# Patient Record
Sex: MALE | Race: WHITE | Employment: OTHER | URBAN - METROPOLITAN AREA
[De-identification: names, ages, dates, MRNs, and addresses within clinical notes are randomized per-mention and may not be internally consistent; named-entity substitution may affect disease eponyms.]

---

## 2020-05-14 ENCOUNTER — APPOINTMENT (OUTPATIENT)
Dept: GENERAL RADIOLOGY | Age: 60
DRG: 872 | End: 2020-05-14
Payer: MEDICARE

## 2020-05-14 ENCOUNTER — HOSPITAL ENCOUNTER (INPATIENT)
Age: 60
LOS: 5 days | Discharge: NURSING, REHAB OR LONG-TERM CARE | DRG: 872 | End: 2020-05-19
Attending: INTERNAL MEDICINE | Admitting: INTERNAL MEDICINE
Payer: MEDICARE

## 2020-05-14 ENCOUNTER — APPOINTMENT (OUTPATIENT)
Dept: CT IMAGING | Age: 60
DRG: 872 | End: 2020-05-14
Payer: MEDICARE

## 2020-05-14 PROBLEM — R29.6 FREQUENT FALLS: Status: ACTIVE | Noted: 2020-05-14

## 2020-05-14 PROBLEM — R53.1 WEAKNESS: Status: ACTIVE | Noted: 2020-05-14

## 2020-05-14 PROBLEM — C32.9 LARYNGEAL CARCINOMA (HCC): Status: ACTIVE | Noted: 2020-05-14

## 2020-05-14 PROBLEM — K21.9 GASTROESOPHAGEAL REFLUX: Status: ACTIVE | Noted: 2020-05-14

## 2020-05-14 PROBLEM — L03.90 CELLULITIS: Status: ACTIVE | Noted: 2020-05-14

## 2020-05-14 LAB
ALBUMIN SERPL-MCNC: 3.9 G/DL (ref 3.5–4.6)
ALP BLD-CCNC: 58 U/L (ref 35–104)
ALT SERPL-CCNC: <5 U/L (ref 0–41)
ANION GAP SERPL CALCULATED.3IONS-SCNC: 12 MEQ/L (ref 9–15)
AST SERPL-CCNC: 62 U/L (ref 0–40)
BACTERIA: NEGATIVE /HPF
BASOPHILS ABSOLUTE: 0 K/UL (ref 0–0.2)
BASOPHILS RELATIVE PERCENT: 0.3 %
BILIRUB SERPL-MCNC: 0.7 MG/DL (ref 0.2–0.7)
BILIRUBIN URINE: NEGATIVE
BLOOD, URINE: NEGATIVE
BUN BLDV-MCNC: 11 MG/DL (ref 8–23)
CALCIUM SERPL-MCNC: 9.2 MG/DL (ref 8.5–9.9)
CHLORIDE BLD-SCNC: 94 MEQ/L (ref 95–107)
CLARITY: CLEAR
CO2: 21 MEQ/L (ref 20–31)
COLOR: YELLOW
CREAT SERPL-MCNC: 0.65 MG/DL (ref 0.7–1.2)
EKG ATRIAL RATE: 105 BPM
EKG P AXIS: 52 DEGREES
EKG P-R INTERVAL: 152 MS
EKG Q-T INTERVAL: 356 MS
EKG QRS DURATION: 78 MS
EKG QTC CALCULATION (BAZETT): 470 MS
EKG R AXIS: 10 DEGREES
EKG T AXIS: 19 DEGREES
EKG VENTRICULAR RATE: 105 BPM
EOSINOPHILS ABSOLUTE: 0 K/UL (ref 0–0.7)
EOSINOPHILS RELATIVE PERCENT: 0 %
EPITHELIAL CELLS, UA: NORMAL /HPF (ref 0–5)
GFR AFRICAN AMERICAN: >60
GFR NON-AFRICAN AMERICAN: >60
GLOBULIN: 4.4 G/DL (ref 2.3–3.5)
GLUCOSE BLD-MCNC: 118 MG/DL (ref 70–99)
GLUCOSE URINE: NEGATIVE MG/DL
HCT VFR BLD CALC: 42.6 % (ref 42–52)
HEMOGLOBIN: 14.1 G/DL (ref 14–18)
HYALINE CASTS: NORMAL /HPF (ref 0–5)
KETONES, URINE: NEGATIVE MG/DL
LACTIC ACID: 2.3 MMOL/L (ref 0.5–2.2)
LEUKOCYTE ESTERASE, URINE: NEGATIVE
LYMPHOCYTES ABSOLUTE: 0.4 K/UL (ref 1–4.8)
LYMPHOCYTES RELATIVE PERCENT: 3 %
MCH RBC QN AUTO: 30.7 PG (ref 27–31.3)
MCHC RBC AUTO-ENTMCNC: 33.2 % (ref 33–37)
MCV RBC AUTO: 92.6 FL (ref 80–100)
MONOCYTES ABSOLUTE: 1.1 K/UL (ref 0.2–0.8)
MONOCYTES RELATIVE PERCENT: 7.3 %
NEUTROPHILS ABSOLUTE: 13 K/UL (ref 1.4–6.5)
NEUTROPHILS RELATIVE PERCENT: 89.4 %
NITRITE, URINE: NEGATIVE
PDW BLD-RTO: 15.3 % (ref 11.5–14.5)
PH UA: 7 (ref 5–9)
PLATELET # BLD: 249 K/UL (ref 130–400)
POTASSIUM SERPL-SCNC: 3.3 MEQ/L (ref 3.4–4.9)
POTASSIUM SERPL-SCNC: ABNORMAL MEQ/L (ref 3.4–4.9)
PROTEIN UA: 100 MG/DL
RBC # BLD: 4.6 M/UL (ref 4.7–6.1)
RBC UA: NORMAL /HPF (ref 0–5)
REASON FOR REJECTION: NORMAL
REJECTED TEST: NORMAL
SODIUM BLD-SCNC: 127 MEQ/L (ref 135–144)
SPECIFIC GRAVITY UA: 1.02 (ref 1–1.03)
TOTAL PROTEIN: 8.3 G/DL (ref 6.3–8)
URINE REFLEX TO CULTURE: ABNORMAL
UROBILINOGEN, URINE: 1 E.U./DL
WBC # BLD: 14.6 K/UL (ref 4.8–10.8)
WBC UA: NORMAL /HPF (ref 0–5)

## 2020-05-14 PROCEDURE — 97162 PT EVAL MOD COMPLEX 30 MIN: CPT

## 2020-05-14 PROCEDURE — 87186 SC STD MICRODIL/AGAR DIL: CPT

## 2020-05-14 PROCEDURE — 6370000000 HC RX 637 (ALT 250 FOR IP): Performed by: INTERNAL MEDICINE

## 2020-05-14 PROCEDURE — 36415 COLL VENOUS BLD VENIPUNCTURE: CPT

## 2020-05-14 PROCEDURE — 6370000000 HC RX 637 (ALT 250 FOR IP): Performed by: NURSE PRACTITIONER

## 2020-05-14 PROCEDURE — 70450 CT HEAD/BRAIN W/O DYE: CPT

## 2020-05-14 PROCEDURE — 93010 ELECTROCARDIOGRAM REPORT: CPT | Performed by: INTERNAL MEDICINE

## 2020-05-14 PROCEDURE — 1210000000 HC MED SURG R&B

## 2020-05-14 PROCEDURE — 96374 THER/PROPH/DIAG INJ IV PUSH: CPT

## 2020-05-14 PROCEDURE — 71045 X-RAY EXAM CHEST 1 VIEW: CPT

## 2020-05-14 PROCEDURE — 87149 DNA/RNA DIRECT PROBE: CPT

## 2020-05-14 PROCEDURE — 80053 COMPREHEN METABOLIC PANEL: CPT

## 2020-05-14 PROCEDURE — 2580000003 HC RX 258: Performed by: NURSE PRACTITIONER

## 2020-05-14 PROCEDURE — 6360000002 HC RX W HCPCS: Performed by: NURSE PRACTITIONER

## 2020-05-14 PROCEDURE — 2580000003 HC RX 258: Performed by: INTERNAL MEDICINE

## 2020-05-14 PROCEDURE — 93005 ELECTROCARDIOGRAM TRACING: CPT | Performed by: NURSE PRACTITIONER

## 2020-05-14 PROCEDURE — 73590 X-RAY EXAM OF LOWER LEG: CPT

## 2020-05-14 PROCEDURE — 81001 URINALYSIS AUTO W/SCOPE: CPT

## 2020-05-14 PROCEDURE — 99285 EMERGENCY DEPT VISIT HI MDM: CPT

## 2020-05-14 PROCEDURE — 6360000002 HC RX W HCPCS: Performed by: INTERNAL MEDICINE

## 2020-05-14 PROCEDURE — 96368 THER/DIAG CONCURRENT INF: CPT

## 2020-05-14 PROCEDURE — 96366 THER/PROPH/DIAG IV INF ADDON: CPT

## 2020-05-14 PROCEDURE — 84132 ASSAY OF SERUM POTASSIUM: CPT

## 2020-05-14 PROCEDURE — 85025 COMPLETE CBC W/AUTO DIFF WBC: CPT

## 2020-05-14 PROCEDURE — 96365 THER/PROPH/DIAG IV INF INIT: CPT

## 2020-05-14 PROCEDURE — 83605 ASSAY OF LACTIC ACID: CPT

## 2020-05-14 PROCEDURE — 96375 TX/PRO/DX INJ NEW DRUG ADDON: CPT

## 2020-05-14 PROCEDURE — 87040 BLOOD CULTURE FOR BACTERIA: CPT

## 2020-05-14 PROCEDURE — 73552 X-RAY EXAM OF FEMUR 2/>: CPT

## 2020-05-14 PROCEDURE — 87077 CULTURE AEROBIC IDENTIFY: CPT

## 2020-05-14 PROCEDURE — 99222 1ST HOSP IP/OBS MODERATE 55: CPT | Performed by: INTERNAL MEDICINE

## 2020-05-14 RX ORDER — OXYCODONE HYDROCHLORIDE AND ACETAMINOPHEN 5; 325 MG/1; MG/1
2 TABLET ORAL EVERY 4 HOURS PRN
Status: ON HOLD | COMMUNITY
End: 2020-06-08

## 2020-05-14 RX ORDER — MORPHINE SULFATE 2 MG/ML
2 INJECTION, SOLUTION INTRAMUSCULAR; INTRAVENOUS ONCE
Status: COMPLETED | OUTPATIENT
Start: 2020-05-14 | End: 2020-05-14

## 2020-05-14 RX ORDER — PROMETHAZINE HYDROCHLORIDE 12.5 MG/1
12.5 TABLET ORAL EVERY 6 HOURS PRN
Status: DISCONTINUED | OUTPATIENT
Start: 2020-05-14 | End: 2020-05-19

## 2020-05-14 RX ORDER — ONDANSETRON 2 MG/ML
4 INJECTION INTRAMUSCULAR; INTRAVENOUS ONCE
Status: COMPLETED | OUTPATIENT
Start: 2020-05-14 | End: 2020-05-14

## 2020-05-14 RX ORDER — OXYCODONE HYDROCHLORIDE AND ACETAMINOPHEN 5; 325 MG/1; MG/1
1 TABLET ORAL EVERY 4 HOURS PRN
Status: DISCONTINUED | OUTPATIENT
Start: 2020-05-14 | End: 2020-05-15

## 2020-05-14 RX ORDER — SODIUM CHLORIDE 0.9 % (FLUSH) 0.9 %
10 SYRINGE (ML) INJECTION EVERY 12 HOURS SCHEDULED
Status: DISCONTINUED | OUTPATIENT
Start: 2020-05-14 | End: 2020-05-19 | Stop reason: HOSPADM

## 2020-05-14 RX ORDER — ONDANSETRON 2 MG/ML
4 INJECTION INTRAMUSCULAR; INTRAVENOUS EVERY 6 HOURS PRN
Status: DISCONTINUED | OUTPATIENT
Start: 2020-05-14 | End: 2020-05-19

## 2020-05-14 RX ORDER — 0.9 % SODIUM CHLORIDE 0.9 %
1000 INTRAVENOUS SOLUTION INTRAVENOUS ONCE
Status: COMPLETED | OUTPATIENT
Start: 2020-05-14 | End: 2020-05-14

## 2020-05-14 RX ORDER — OXYCODONE HYDROCHLORIDE AND ACETAMINOPHEN 5; 325 MG/1; MG/1
1 TABLET ORAL EVERY 4 HOURS PRN
Status: DISCONTINUED | OUTPATIENT
Start: 2020-05-14 | End: 2020-05-14

## 2020-05-14 RX ORDER — POLYETHYLENE GLYCOL 3350 17 G/17G
17 POWDER, FOR SOLUTION ORAL DAILY PRN
Status: DISCONTINUED | OUTPATIENT
Start: 2020-05-14 | End: 2020-05-19

## 2020-05-14 RX ORDER — FAMOTIDINE 20 MG/1
20 TABLET, FILM COATED ORAL 2 TIMES DAILY
Status: DISCONTINUED | OUTPATIENT
Start: 2020-05-14 | End: 2020-05-19

## 2020-05-14 RX ORDER — OXYCODONE HYDROCHLORIDE AND ACETAMINOPHEN 5; 325 MG/1; MG/1
2 TABLET ORAL EVERY 4 HOURS PRN
Status: DISCONTINUED | OUTPATIENT
Start: 2020-05-14 | End: 2020-05-15

## 2020-05-14 RX ORDER — ACETAMINOPHEN 325 MG/1
650 TABLET ORAL EVERY 6 HOURS PRN
Status: DISCONTINUED | OUTPATIENT
Start: 2020-05-14 | End: 2020-05-17

## 2020-05-14 RX ORDER — ACETAMINOPHEN 650 MG/1
650 SUPPOSITORY RECTAL ONCE
Status: COMPLETED | OUTPATIENT
Start: 2020-05-14 | End: 2020-05-14

## 2020-05-14 RX ORDER — OXYCODONE HYDROCHLORIDE AND ACETAMINOPHEN 325; 5 MG/5ML; MG/5ML
SOLUTION ORAL EVERY 4 HOURS PRN
Status: ON HOLD | COMMUNITY
End: 2020-05-14

## 2020-05-14 RX ORDER — ACETAMINOPHEN 650 MG/1
650 SUPPOSITORY RECTAL EVERY 6 HOURS PRN
Status: DISCONTINUED | OUTPATIENT
Start: 2020-05-14 | End: 2020-05-17

## 2020-05-14 RX ORDER — MORPHINE SULFATE 2 MG/ML
4 INJECTION, SOLUTION INTRAMUSCULAR; INTRAVENOUS ONCE
Status: COMPLETED | OUTPATIENT
Start: 2020-05-14 | End: 2020-05-14

## 2020-05-14 RX ORDER — SODIUM CHLORIDE 9 MG/ML
INJECTION, SOLUTION INTRAVENOUS CONTINUOUS
Status: DISCONTINUED | OUTPATIENT
Start: 2020-05-14 | End: 2020-05-15

## 2020-05-14 RX ORDER — SODIUM CHLORIDE 0.9 % (FLUSH) 0.9 %
10 SYRINGE (ML) INJECTION PRN
Status: DISCONTINUED | OUTPATIENT
Start: 2020-05-14 | End: 2020-05-19 | Stop reason: HOSPADM

## 2020-05-14 RX ADMIN — HYDROMORPHONE HYDROCHLORIDE 1 MG: 1 INJECTION, SOLUTION INTRAMUSCULAR; INTRAVENOUS; SUBCUTANEOUS at 04:10

## 2020-05-14 RX ADMIN — OXYCODONE HYDROCHLORIDE AND ACETAMINOPHEN 2 TABLET: 5; 325 TABLET ORAL at 18:38

## 2020-05-14 RX ADMIN — ENOXAPARIN SODIUM 40 MG: 40 INJECTION SUBCUTANEOUS at 09:00

## 2020-05-14 RX ADMIN — OXYCODONE HYDROCHLORIDE AND ACETAMINOPHEN 1 TABLET: 5; 325 TABLET ORAL at 13:44

## 2020-05-14 RX ADMIN — ACETAMINOPHEN 650 MG: 325 TABLET ORAL at 06:06

## 2020-05-14 RX ADMIN — SODIUM CHLORIDE 1000 ML: 9 INJECTION, SOLUTION INTRAVENOUS at 03:52

## 2020-05-14 RX ADMIN — Medication 10 ML: at 09:00

## 2020-05-14 RX ADMIN — ACETAMINOPHEN 650 MG: 325 TABLET ORAL at 16:27

## 2020-05-14 RX ADMIN — OXYCODONE HYDROCHLORIDE AND ACETAMINOPHEN 2 TABLET: 5; 325 TABLET ORAL at 23:55

## 2020-05-14 RX ADMIN — PIPERACILLIN AND TAZOBACTAM 3.38 G: 3; .375 INJECTION, POWDER, LYOPHILIZED, FOR SOLUTION INTRAVENOUS at 02:31

## 2020-05-14 RX ADMIN — SODIUM CHLORIDE: 9 INJECTION, SOLUTION INTRAVENOUS at 06:07

## 2020-05-14 RX ADMIN — CEFTRIAXONE SODIUM 1 G: 1 INJECTION, POWDER, FOR SOLUTION INTRAMUSCULAR; INTRAVENOUS at 13:44

## 2020-05-14 RX ADMIN — MORPHINE SULFATE 2 MG: 2 INJECTION, SOLUTION INTRAMUSCULAR; INTRAVENOUS at 06:21

## 2020-05-14 RX ADMIN — VANCOMYCIN HYDROCHLORIDE 1000 MG: 1 INJECTION, POWDER, LYOPHILIZED, FOR SOLUTION INTRAVENOUS at 02:26

## 2020-05-14 RX ADMIN — MORPHINE SULFATE 4 MG: 2 INJECTION, SOLUTION INTRAMUSCULAR; INTRAVENOUS at 02:35

## 2020-05-14 RX ADMIN — FAMOTIDINE 20 MG: 20 TABLET ORAL at 20:47

## 2020-05-14 RX ADMIN — SODIUM CHLORIDE: 9 INJECTION, SOLUTION INTRAVENOUS at 20:41

## 2020-05-14 RX ADMIN — ACETAMINOPHEN 650 MG: 650 SUPPOSITORY RECTAL at 02:41

## 2020-05-14 RX ADMIN — Medication 10 ML: at 20:45

## 2020-05-14 RX ADMIN — PIPERACILLIN AND TAZOBACTAM 3.38 G: 3; .375 INJECTION, POWDER, LYOPHILIZED, FOR SOLUTION INTRAVENOUS at 10:45

## 2020-05-14 RX ADMIN — ONDANSETRON 4 MG: 2 INJECTION INTRAMUSCULAR; INTRAVENOUS at 02:35

## 2020-05-14 RX ADMIN — FAMOTIDINE 20 MG: 20 TABLET ORAL at 09:00

## 2020-05-14 ASSESSMENT — ENCOUNTER SYMPTOMS
EYE REDNESS: 0
RHINORRHEA: 0
BLOOD IN STOOL: 0
BACK PAIN: 0
WHEEZING: 0
NAUSEA: 0
ABDOMINAL PAIN: 0
SHORTNESS OF BREATH: 0
EYES NEGATIVE: 1
COUGH: 0
GASTROINTESTINAL NEGATIVE: 1
EYE DISCHARGE: 0
EYE PAIN: 0
DIARRHEA: 0
CONSTIPATION: 0
COUGH: 1
VOMITING: 0
SORE THROAT: 0
COLOR CHANGE: 0
TROUBLE SWALLOWING: 0

## 2020-05-14 ASSESSMENT — PAIN SCALES - GENERAL
PAINLEVEL_OUTOF10: 8
PAINLEVEL_OUTOF10: 9
PAINLEVEL_OUTOF10: 8
PAINLEVEL_OUTOF10: 6
PAINLEVEL_OUTOF10: 9
PAINLEVEL_OUTOF10: 9
PAINLEVEL_OUTOF10: 3
PAINLEVEL_OUTOF10: 8
PAINLEVEL_OUTOF10: 0
PAINLEVEL_OUTOF10: 9
PAINLEVEL_OUTOF10: 8
PAINLEVEL_OUTOF10: 9

## 2020-05-14 ASSESSMENT — PAIN DESCRIPTION - LOCATION
LOCATION: THROAT

## 2020-05-14 ASSESSMENT — PAIN DESCRIPTION - DESCRIPTORS: DESCRIPTORS: SORE

## 2020-05-14 ASSESSMENT — PAIN DESCRIPTION - PAIN TYPE
TYPE: ACUTE PAIN

## 2020-05-14 NOTE — CARE COORDINATION
CALL PLACED TO SPIRITUAL CARE FOR DPOA FOR HEALTHCARE, ADVANCED DIRECTIVES. PER REQUEST OF DAUGHTER IN LAW ALEXANDRA. PER PATIENT, BROTHER CORINNE IS DPOA. ALSO CALL PLACED TO [facility] PER REQUEST.

## 2020-05-14 NOTE — PROGRESS NOTES
--    BUN 11  --    CREATININE 0.65*  --    CALCIUM 9.2  --      Recent Labs     05/14/20  0130   AST 62*   ALT <5   BILITOT 0.7   ALKPHOS 58     No results for input(s): INR in the last 72 hours. No results for input(s): Malu Boxer in the last 72 hours. Urinalysis:      Lab Results   Component Value Date    NITRU Negative 05/14/2020    WBCUA 0-2 05/14/2020    BACTERIA Negative 05/14/2020    RBCUA 0-2 05/14/2020    BLOODU Negative 05/14/2020    SPECGRAV 1.025 05/14/2020    GLUCOSEU Negative 05/14/2020     Radiology:  RADIOLOGY REPORT   Final Result      XR FEMUR LEFT (MIN 2 VIEWS)   Final Result      No acute osseous abnormality. XR TIBIA FIBULA LEFT (2 VIEWS)   Final Result      No acute osseous abnormality. XR CHEST PORTABLE   Final Result   NO ACUTE CARDIOPULMONARY DISEASE. CT Head WO Contrast   Final Result   Impression:      Left sphenoid sinusitis. All CT scans at this facility use dose modulation, iterative reconstruction, and/or weight based dosing when appropriate to reduce radiation dose to as low as reasonably achievable.         Assessment/Plan:    #Sepsis POA secondary to cellulitis     - Continue IV Abx per ID    #Laryngeal carcinoma       - Patient has a trach in place; doesn't follow with oncology; has minimal care        - Will consult ENT to evaluate the trach; apparently was supposed to be changed before      - hospice consult for potential home with hospice per family and patients wishes    #Frequent falls; Weakness     - PT/OT    #GERD     - Continue pepcid    #Hyponatremia     - Likely hypovolemic; monitor and trend; if worsens or does not improve will consutl nephrology    Active Hospital Problems    Diagnosis Date Noted    Cellulitis [L03.90] 05/14/2020    Frequent falls [R29.6] 05/14/2020    Weakness [R53.1] 05/14/2020    Laryngeal carcinoma ([facility] [address] [C32.9] 05/14/2020    Gastroesophageal reflux [K21.9] 05/14/2020     Additional work up or/and

## 2020-05-14 NOTE — H&P
%    RDW 15.3 (H) 11.5 - 14.5 %    Platelets 230 885 - 030 K/uL    Neutrophils % 89.4 %    Lymphocytes % 3.0 %    Monocytes % 7.3 %    Eosinophils % 0.0 %    Basophils % 0.3 %    Neutrophils Absolute 13.0 (H) 1.4 - 6.5 K/uL    Lymphocytes Absolute 0.4 (L) 1.0 - 4.8 K/uL    Monocytes Absolute 1.1 (H) 0.2 - 0.8 K/uL    Eosinophils Absolute 0.0 0.0 - 0.7 K/uL    Basophils Absolute 0.0 0.0 - 0.2 K/uL   Lactic Acid, Plasma    Collection Time: 05/14/20  1:30 AM   Result Value Ref Range    Lactic Acid 2.3 (H) 0.5 - 2.2 mmol/L   Urine Reflex to Culture    Collection Time: 05/14/20  1:30 AM   Result Value Ref Range    Color, UA Yellow Straw/Yellow    Clarity, UA Clear Clear    Glucose, Ur Negative Negative mg/dL    Bilirubin Urine Negative Negative    Ketones, Urine Negative Negative mg/dL    Specific Gravity, UA 1.025 1.005 - 1.030    Blood, Urine Negative Negative    pH, UA 7.0 5.0 - 9.0    Protein,  (A) Negative mg/dL    Urobilinogen, Urine 1.0 <2.0 E.U./dL    Nitrite, Urine Negative Negative    Leukocyte Esterase, Urine Negative Negative    Urine Reflex to Culture Not Indicated    Microscopic Urinalysis    Collection Time: 05/14/20  1:30 AM   Result Value Ref Range    Bacteria, UA Negative Negative /HPF    Hyaline Casts, UA 0-1 0 - 5 /HPF    WBC, UA 0-2 0 - 5 /HPF    RBC, UA 0-2 0 - 5 /HPF    Epithelial Cells, UA 0-2 0 - 5 /HPF   EKG 12 Lead - Chest Pain    Collection Time: 05/14/20  2:09 AM   Result Value Ref Range    Ventricular Rate 105 BPM    Atrial Rate 105 BPM    P-R Interval 152 ms    QRS Duration 78 ms    Q-T Interval 356 ms    QTc Calculation (Bazett) 470 ms    P Axis 52 degrees    R Axis 10 degrees    T Axis 19 degrees   SPECIMEN REJECTION    Collection Time: 05/14/20  4:51 AM   Result Value Ref Range    Rejected Test k     Reason for Rejection see below        IMAGING:  No results found.     VTE Prophylaxis: low molecular weight heparin -  start    ASSESSMENT AND PLAN  Active Problems:    Cellulitis

## 2020-05-14 NOTE — ED PROVIDER NOTES
note:    XR FEMUR LEFT (MIN 2 VIEWS)    (Results Pending)   XR TIBIA FIBULA LEFT (2 VIEWS)    (Results Pending)   XR CHEST PORTABLE    (Results Pending)   CT Head WO Contrast    (Results Pending)         ED BEDSIDE ULTRASOUND:   Performed by ED Physician - none    LABS:  Labs Reviewed   COMPREHENSIVE METABOLIC PANEL - Abnormal; Notable for the following components:       Result Value    Sodium 127 (*)     Chloride 94 (*)     Glucose 118 (*)     CREATININE 0.65 (*)     Total Protein 8.3 (*)     AST 62 (*)     Globulin 4.4 (*)     All other components within normal limits   CBC WITH AUTO DIFFERENTIAL - Abnormal; Notable for the following components:    WBC 14.6 (*)     RBC 4.60 (*)     RDW 15.3 (*)     Neutrophils Absolute 13.0 (*)     Lymphocytes Absolute 0.4 (*)     Monocytes Absolute 1.1 (*)     All other components within normal limits   LACTIC ACID, PLASMA - Abnormal; Notable for the following components:    Lactic Acid 2.3 (*)     All other components within normal limits   URINE RT REFLEX TO CULTURE - Abnormal; Notable for the following components:    Protein,  (*)     All other components within normal limits   CULTURE, BLOOD 1   CULTURE, BLOOD 2   MICROSCOPIC URINALYSIS   POTASSIUM       All other labs were within normal range or not returned as of this dictation. EMERGENCY DEPARTMENT COURSE and DIFFERENTIAL DIAGNOSIS/MDM:   Vitals:    Vitals:    05/14/20 0139 05/14/20 0155 05/14/20 0157 05/14/20 0358   BP:   (!) 147/87 (!) 142/76   Pulse: 112  109 98   Resp:   24 16   Temp: 101.5 °F (38.6 °C)   99 °F (37.2 °C)   TempSrc: Oral   Oral   SpO2:   96% 97%   Weight:  140 lb (63.5 kg)     Height:  5' 5\" (1.651 m)              MDM   Patient is a 9year-old male presenting emergency department chief complaint of generalized weakness, frequent falls, and fatigue. He is hemodynamically stable nontoxic-appearing at this time.     There is marked erythema around the site of the trachea wound and it appears to be infected. Septic work-up initiated as patient meets sirs criteria and has a fever of 101.5, tachycardia, and tachypnea. However he is compensating well. I gave him suppository Tylenol and 2 L of fluid and will be reassessing. Vancomycin and Zosyn also initiated. Patient has sepsis secondary to the skin infection. Positive leukocytosis and lactic acidosis. Patient is admitted to the service of the hospitalist in a stable condition with stable vital signs updated on the treatment plan. Vitals have moderately improved. CRITICAL CARE TIME       CONSULTS:  None    PROCEDURES:  Unless otherwise noted below, none     Procedures    FINAL IMPRESSION      1. Septicemia ([address]    2. Local infection of skin and subcutaneous tissue    3. Lactic acidosis          DISPOSITION/PLAN   DISPOSITION        PATIENT REFERRED TO:  No follow-up provider specified.     DISCHARGE MEDICATIONS:  New Prescriptions    No medications on file          (Please notethat portions of this note were completed with a voice recognition program.  Efforts were made to edit the dictations but occasionally words are mis-transcribed.)    MEI Sotelo CNP (electronically signed)  Attending Emergency Physician          MEI Rivera CNP  05/14/20 2032

## 2020-05-14 NOTE — ED NOTES
Labs and urine obtained by this RN, labeled and sent to lab via tube system.       uNvia Sauer RN  05/14/20 0191

## 2020-05-14 NOTE — PROGRESS NOTES
high  Clinical Presentation: med    Prognosis: Good  Barriers to Learning: none    DISCHARGE RECOMMENDATIONS:  Discharge Recommendations: Continue to assess pending progress, Patient would benefit from continued therapy after discharge    Assessment: Pt presents with recent falls and worsening weakness L LE >R LE. Pt demonstrates the above deficits and decline in functional mobility status placing them at increased risk for falls. Pt would benefit from physical therapy to address above deficits and allow for safe return home at highest level of function, decrease risk for falls, and improve QOL. REQUIRES PT FOLLOW UP: Yes      PLAN OF CARE:  Plan  Times per week: 3-6  Current Treatment Recommendations: Strengthening, Functional Mobility Training, Neuromuscular Re-education, Home Exercise Program, Equipment Evaluation, Education, & procurement, Modalities, Safety Education & Training, Gait Training, ROM, Balance Training, Endurance Training, Stair training, Patient/Caregiver Education & Training, Positioning  Safety Devices  Type of devices: All fall risk precautions in place, Bed alarm in place    Goals:  Patient goals : \"get stronger\"  Long term goals  Long term goal 1: bed mobility with indep   Long term goal 2: functional transfers with indep   Long term goal 3: amb 50ft with LRAD and indep   Long term goal 4: 1 curb step with AD and SBA to enter/exit home  Long term goal 5: indep with HEP to improve LE strength,     AMPAC (6 CLICK) BASIC MOBILITY  AM-PAC Inpatient Mobility Raw Score : 16   Therapy Time:   Individual   Time In 1340   Time Out 1354   Minutes [address] [place] [address], 05/14/20 at 1:58 PM         Definitions for assistance levels  Independent = pt does not require any physical supervision or assistance from another person for activity completion. Device may be needed.   Stand by assistance = pt requires verbal cues or instructions from another person, close to but not touching, to perform the activity  Minimal assistance= pt performs 75% or more of the activity; assistance is required to complete the activity  Moderate assistance= pt performs 50% of the activity; assistance is required to complete the activity  Maximal assistance = pt performs 25% of the activity; assistance is required to complete the activity  Dependent = pt requires total physical assistance to accomplish the task

## 2020-05-15 LAB
ALBUMIN SERPL-MCNC: 2.8 G/DL (ref 3.5–4.6)
ALP BLD-CCNC: 50 U/L (ref 35–104)
ALT SERPL-CCNC: 34 U/L (ref 0–41)
ANION GAP SERPL CALCULATED.3IONS-SCNC: 12 MEQ/L (ref 9–15)
AST SERPL-CCNC: 28 U/L (ref 0–40)
BASOPHILS ABSOLUTE: 0 K/UL (ref 0–0.2)
BASOPHILS RELATIVE PERCENT: 0.5 %
BILIRUB SERPL-MCNC: 0.4 MG/DL (ref 0.2–0.7)
BLOOD CULTURE, ROUTINE: ABNORMAL
BUN BLDV-MCNC: 13 MG/DL (ref 8–23)
CALCIUM SERPL-MCNC: 8.5 MG/DL (ref 8.5–9.9)
CHLORIDE BLD-SCNC: 106 MEQ/L (ref 95–107)
CO2: 20 MEQ/L (ref 20–31)
CREAT SERPL-MCNC: 0.56 MG/DL (ref 0.7–1.2)
EOSINOPHILS ABSOLUTE: 0.1 K/UL (ref 0–0.7)
EOSINOPHILS RELATIVE PERCENT: 1.5 %
GFR AFRICAN AMERICAN: >60
GFR NON-AFRICAN AMERICAN: >60
GLOBULIN: 3.5 G/DL (ref 2.3–3.5)
GLUCOSE BLD-MCNC: 69 MG/DL (ref 70–99)
HCT VFR BLD CALC: 37 % (ref 42–52)
HEMOGLOBIN: 12.5 G/DL (ref 14–18)
LACTIC ACID: 0.9 MMOL/L (ref 0.5–2.2)
LYMPHOCYTES ABSOLUTE: 0.6 K/UL (ref 1–4.8)
LYMPHOCYTES RELATIVE PERCENT: 8.1 %
MCH RBC QN AUTO: 32.4 PG (ref 27–31.3)
MCHC RBC AUTO-ENTMCNC: 33.9 % (ref 33–37)
MCV RBC AUTO: 95.7 FL (ref 80–100)
MONOCYTES ABSOLUTE: 0.5 K/UL (ref 0.2–0.8)
MONOCYTES RELATIVE PERCENT: 6.7 %
NEUTROPHILS ABSOLUTE: 6.5 K/UL (ref 1.4–6.5)
NEUTROPHILS RELATIVE PERCENT: 83.2 %
ORGANISM: ABNORMAL
ORGANISM: ABNORMAL
PDW BLD-RTO: 15.9 % (ref 11.5–14.5)
PLATELET # BLD: 206 K/UL (ref 130–400)
POTASSIUM REFLEX MAGNESIUM: 3.6 MEQ/L (ref 3.4–4.9)
RBC # BLD: 3.87 M/UL (ref 4.7–6.1)
SODIUM BLD-SCNC: 138 MEQ/L (ref 135–144)
TOTAL PROTEIN: 6.3 G/DL (ref 6.3–8)
WBC # BLD: 7.8 K/UL (ref 4.8–10.8)

## 2020-05-15 PROCEDURE — 6360000002 HC RX W HCPCS: Performed by: NURSE PRACTITIONER

## 2020-05-15 PROCEDURE — 1210000000 HC MED SURG R&B

## 2020-05-15 PROCEDURE — 2580000003 HC RX 258: Performed by: INTERNAL MEDICINE

## 2020-05-15 PROCEDURE — 85025 COMPLETE CBC W/AUTO DIFF WBC: CPT

## 2020-05-15 PROCEDURE — 36415 COLL VENOUS BLD VENIPUNCTURE: CPT

## 2020-05-15 PROCEDURE — 6370000000 HC RX 637 (ALT 250 FOR IP): Performed by: NURSE PRACTITIONER

## 2020-05-15 PROCEDURE — 6370000000 HC RX 637 (ALT 250 FOR IP): Performed by: INTERNAL MEDICINE

## 2020-05-15 PROCEDURE — 83605 ASSAY OF LACTIC ACID: CPT

## 2020-05-15 PROCEDURE — 99232 SBSQ HOSP IP/OBS MODERATE 35: CPT | Performed by: INTERNAL MEDICINE

## 2020-05-15 PROCEDURE — 80053 COMPREHEN METABOLIC PANEL: CPT

## 2020-05-15 PROCEDURE — 6360000002 HC RX W HCPCS: Performed by: INTERNAL MEDICINE

## 2020-05-15 RX ORDER — AMOXICILLIN 250 MG
1 CAPSULE ORAL DAILY
COMMUNITY

## 2020-05-15 RX ORDER — LEVOTHYROXINE SODIUM 0.07 MG/1
75 TABLET ORAL DAILY
Status: DISCONTINUED | OUTPATIENT
Start: 2020-05-15 | End: 2020-05-19

## 2020-05-15 RX ORDER — LEVOTHYROXINE SODIUM 0.07 MG/1
75 TABLET ORAL DAILY
COMMUNITY

## 2020-05-15 RX ORDER — OXYCODONE HYDROCHLORIDE AND ACETAMINOPHEN 5; 325 MG/1; MG/1
2 TABLET ORAL EVERY 6 HOURS PRN
Status: DISCONTINUED | OUTPATIENT
Start: 2020-05-15 | End: 2020-05-16

## 2020-05-15 RX ORDER — AMOXICILLIN 250 MG
1 CAPSULE ORAL DAILY
Status: DISCONTINUED | OUTPATIENT
Start: 2020-05-15 | End: 2020-05-19

## 2020-05-15 RX ADMIN — FAMOTIDINE 20 MG: 20 TABLET ORAL at 08:41

## 2020-05-15 RX ADMIN — OXYCODONE HYDROCHLORIDE AND ACETAMINOPHEN 2 TABLET: 5; 325 TABLET ORAL at 10:47

## 2020-05-15 RX ADMIN — CEFTRIAXONE SODIUM 1 G: 1 INJECTION, POWDER, FOR SOLUTION INTRAMUSCULAR; INTRAVENOUS at 14:00

## 2020-05-15 RX ADMIN — OXYCODONE HYDROCHLORIDE AND ACETAMINOPHEN 2 TABLET: 5; 325 TABLET ORAL at 15:01

## 2020-05-15 RX ADMIN — FAMOTIDINE 20 MG: 20 TABLET ORAL at 20:22

## 2020-05-15 RX ADMIN — DOCUSATE SODIUM 50MG AND SENNOSIDES 8.6MG 1 TABLET: 8.6; 5 TABLET, FILM COATED ORAL at 20:22

## 2020-05-15 RX ADMIN — OXYCODONE HYDROCHLORIDE AND ACETAMINOPHEN 2 TABLET: 5; 325 TABLET ORAL at 20:22

## 2020-05-15 RX ADMIN — OXYCODONE HYDROCHLORIDE AND ACETAMINOPHEN 2 TABLET: 5; 325 TABLET ORAL at 04:51

## 2020-05-15 RX ADMIN — ACETAMINOPHEN 650 MG: 325 TABLET ORAL at 22:06

## 2020-05-15 RX ADMIN — ENOXAPARIN SODIUM 40 MG: 40 INJECTION SUBCUTANEOUS at 08:41

## 2020-05-15 ASSESSMENT — PAIN SCALES - GENERAL
PAINLEVEL_OUTOF10: 0
PAINLEVEL_OUTOF10: 7
PAINLEVEL_OUTOF10: 8
PAINLEVEL_OUTOF10: 0
PAINLEVEL_OUTOF10: 8
PAINLEVEL_OUTOF10: 8
PAINLEVEL_OUTOF10: 7

## 2020-05-15 ASSESSMENT — ENCOUNTER SYMPTOMS
GASTROINTESTINAL NEGATIVE: 1
COLOR CHANGE: 1
RESPIRATORY NEGATIVE: 1

## 2020-05-15 NOTE — PROGRESS NOTES
Infectious Disease     Patient Name: Liam Hayse  Date: 5/15/2020  YOB: 1960  Medical Record Number: [id number]    Sternoclavicular joint septic arthritis  Cellulitis chest      History of Present Illness:  Throat cancer chronic trach      Patient presents with increasing falls weakness nonverbal when EMS first evaluated patient currently patient had been refusing to come to the hospital initially  ER note reports patient being oriented by the time he arrived to the emergency room  Complains of weakness left leg pain    Patient found to have fevers 101.5 on admission    Lactic acid 2.3  Urinalysis negative  Count 14,600 lymphopenia    Reported to have redness warmth swelling of the anterior chest    Chest x-ray shows no infiltrate      Review of Systems   Constitutional: Positive for fever. Negative for fatigue. Respiratory: Negative. Cardiovascular: Negative. Gastrointestinal: Negative. Skin: Positive for color change. Physical Exam   Eyes: Pupils are equal, round, and reactive to light. Cardiovascular: Normal heart sounds. No murmur heard. Pulmonary/Chest: Effort normal and breath sounds normal. No respiratory distress. He has no wheezes. He has no rales. Abdominal: Soft. Bowel sounds are normal. He exhibits no distension and no mass. There is no abdominal tenderness. Blood pressure (!) 96/58, pulse 64, temperature 98.6 °F (37 °C), temperature source Oral, resp. rate 18, height 5' 5\" (1.651 m), weight 140 lb (63.5 kg), SpO2 95 %.       .   Lab Results   Component Value Date    WBC 7.8 05/15/2020    HGB 12.5 (L) 05/15/2020    HCT 37.0 (L) 05/15/2020    MCV 95.7 05/15/2020     05/15/2020     Lab Results   Component Value Date     05/15/2020    K 3.6 05/15/2020     05/15/2020    CO2 20 05/15/2020    BUN 13 05/15/2020    CREATININE 0.56 05/15/2020    GLUCOSE 69 05/15/2020    CALCIUM 8.5 05/15/2020      Culture, Blood 1 [[id number]] (Abnormal)

## 2020-05-15 NOTE — PROGRESS NOTES
PHARMACY NOTE:   Vancomycin therapy discontinued by Dr. Twin Asher on 05/14/20 (patient changed to ceftriaxone therapy)   DC'd consult, placeholder, further trough levels   Will close iVent    If pharmacy is to continue to follow vancomycin therapy, will need to be re-consulted. Thanks!      Justin Whitman, PharmD   5/15/2020 1:34 PM

## 2020-05-15 NOTE — CARE COORDINATION
SPOKE WITH DR Geno Camara WHO FEELS PATIENT WOULD NEED MINIMALLY 2 MORE DAYS OF ATB AND IT WOULD BE ASSESSED ON A DAY BY DAY BASIS. HOSPICE RN UPDATED.

## 2020-05-15 NOTE — PROGRESS NOTES
Shift Assessment completed. Pt is alert and oriented x4, calm, cooperative. PERRLA. Pt has numbness and tingling in BUE, BLE. Heart sounds WDL, Lung sounds normal, unlabored, chest expansion symmetrical but diminished. Pt has a trach that is capped, the skin surrounding the trach is red and warm to the touch. The redness extends down his left chest and all around is neck. Pt stated he does have weakness in BLE. He does have a mariano catheter for retention and it is draining clear, yellow urine. Pt did have c/o 8/10 pain and was medicated with PRN percocet. (He does take his pills crushed in applesauce) No other complaints at this time, will continue to monitor.

## 2020-05-15 NOTE — CARE COORDINATION
PER DR LARA PT WILL NEED PROBABLE 6 WEEKS OV IV ATB AND POSSIBLE SURGERY.  LSW UPDATED AND WILL ASSESS IF PT WISHES TO DO IV ATB AND SEE IF SNF IS AN OPTION

## 2020-05-15 NOTE — PROGRESS NOTES
05/14/2020    Gastroesophageal reflux [K21.9] 05/14/2020     Additional work up or/and treatment plan may be added today or then after based on clinical progression. I am managing a portion of pt care. Some medical issues are handled by other specialists. Additional work up and treatment should be done in out pt setting by pt PCP and other out pt providers. In addition to examining and evaluating pt, I spent additional time explaining care, normal and abnormal findings, and treatment plan. All of pt questions were answered. Counseling, diet and education were  provided. Case will be discussed with nursing staff when appropriate. Family will be updated if and when appropriate.       Diet: DIET GENERAL; Mildly Thick (Nectar)  Dietary Nutrition Supplements: Frozen Oral Supplement    Code Status: Full Code    PT/OT Eval     Electronically signed by Vladimir Hathaway MD on 5/15/2020 at 1:19 PM

## 2020-05-15 NOTE — CARE COORDINATION
PER HOSPICE RN PLAN IS HOME WITH HOSPICE WHEN CLEARED FOR DC. QUESTION AROSE OF HOW MANY MORE DAYS OF IV ATB BEFORE DC HOME WITH HOSPICE.  WILL AWAIT DR Lenin Manuel

## 2020-05-15 NOTE — PROGRESS NOTES
Advance Care Planning     Non-Provider Advance Care Planning (ACP) Note    Date of ACP Conversation: 5/15/2020  Persons included in Conversation: patient and family  Length of ACP Conversation in minutes: 30 minutes    Conversation requested by:   Patient  Other: Daughter-in-law Baldemar Gordon (if patient is incapable of making informed decisions): This person is:  Named in Advance Directive or [address] [facility] [address] for ALL Patients with Decision Making Capacity:    Advance Directive Conversation with Patients who have not yet planned:  [facility] JACK you thought about who you would want to make decisions about your future healthcare and treatment if you were unable to?\" Yes: Name and Relationship Jung Santiago - Daughter-in-law  Reviewed healthcare agent role and authority/[place]  \"Would this person honor your decisions even if he/she does not agree with them? \" Yes    Review of Existing Advance Directive: (Select questions covered)  Does this advance directive still reflect your preferences? Yes    Interventions Provided:  Completed new Advance Directive with patient  Provided copy of completed document for scanning into medical record       Spiritual Care Services     Summary of Visit:  Completed new Advanced Directive documents    Spiritual Assessment/Intervention/Outcomes:    Encounter Summary  Services provided to[de-identified] Patient and family together  Referral/Consult From[de-identified] Hospice  Support System: Children, Family members  Place of Advent: None  Continue Visiting: No  Complexity of Encounter: Low  Length of Encounter: 30 minutes  Spiritual Assessment Completed: Yes  Advance Care Planning:  Yes                 Advance Directives (For Healthcare)  Healthcare Directive: Yes, patient has an advance directive for healthcare treatment  Type of Healthcare Directive: Durable power of  for health care, Living will  Copy in Chart: Yes, copy in chart  Chart Copy Status [de-identified] Active, Current  Date Reviewed and Current[de-identified] 05/15/20  Healthcare Agent Appointed: Healthcare power of   Healthcare Agent's Name: [address] Agent's Phone Number: [phone]  If you are unable to speak for yourself, does your Healthcare Agent or Legal Spokesperson know your healthcare wishes?: Yes           Values / Beliefs  Do you have any ethnic, cultural, sacramental, or spiritual Advent needs you would like us to be aware of while you are in the hospital?: No    Care Plan:      [id number]   Electronically signed by Norma Lama on 5/15/20 at 12:22 PM EDT     To reach a  for emotional and spiritual support, place an [facility] consult request.   If a  is needed immediately, dial 0 and ask to page the on-call .

## 2020-05-16 LAB
ANION GAP SERPL CALCULATED.3IONS-SCNC: 14 MEQ/L (ref 9–15)
BASOPHILS ABSOLUTE: 0 K/UL (ref 0–0.2)
BASOPHILS RELATIVE PERCENT: 0.3 %
BUN BLDV-MCNC: 12 MG/DL (ref 8–23)
CALCIUM SERPL-MCNC: 9.1 MG/DL (ref 8.5–9.9)
CHLORIDE BLD-SCNC: 101 MEQ/L (ref 95–107)
CO2: 21 MEQ/L (ref 20–31)
CREAT SERPL-MCNC: 0.57 MG/DL (ref 0.7–1.2)
EOSINOPHILS ABSOLUTE: 0.1 K/UL (ref 0–0.7)
EOSINOPHILS RELATIVE PERCENT: 1.2 %
GFR AFRICAN AMERICAN: >60
GFR NON-AFRICAN AMERICAN: >60
GLUCOSE BLD-MCNC: 77 MG/DL (ref 70–99)
HCT VFR BLD CALC: 39.3 % (ref 42–52)
HEMOGLOBIN: 13 G/DL (ref 14–18)
LYMPHOCYTES ABSOLUTE: 0.5 K/UL (ref 1–4.8)
LYMPHOCYTES RELATIVE PERCENT: 5.9 %
MAGNESIUM: 2 MG/DL (ref 1.7–2.4)
MCH RBC QN AUTO: 31.4 PG (ref 27–31.3)
MCHC RBC AUTO-ENTMCNC: 33.1 % (ref 33–37)
MCV RBC AUTO: 95 FL (ref 80–100)
MONOCYTES ABSOLUTE: 0.7 K/UL (ref 0.2–0.8)
MONOCYTES RELATIVE PERCENT: 8.3 %
NEUTROPHILS ABSOLUTE: 6.7 K/UL (ref 1.4–6.5)
NEUTROPHILS RELATIVE PERCENT: 84.3 %
PDW BLD-RTO: 15 % (ref 11.5–14.5)
PLATELET # BLD: 262 K/UL (ref 130–400)
POTASSIUM REFLEX MAGNESIUM: 3.3 MEQ/L (ref 3.4–4.9)
RBC # BLD: 4.14 M/UL (ref 4.7–6.1)
SODIUM BLD-SCNC: 136 MEQ/L (ref 135–144)
WBC # BLD: 7.9 K/UL (ref 4.8–10.8)

## 2020-05-16 PROCEDURE — 80048 BASIC METABOLIC PNL TOTAL CA: CPT

## 2020-05-16 PROCEDURE — 97535 SELF CARE MNGMENT TRAINING: CPT

## 2020-05-16 PROCEDURE — 6360000002 HC RX W HCPCS: Performed by: INTERNAL MEDICINE

## 2020-05-16 PROCEDURE — 6370000000 HC RX 637 (ALT 250 FOR IP): Performed by: INTERNAL MEDICINE

## 2020-05-16 PROCEDURE — 85025 COMPLETE CBC W/AUTO DIFF WBC: CPT

## 2020-05-16 PROCEDURE — 2580000003 HC RX 258: Performed by: NURSE PRACTITIONER

## 2020-05-16 PROCEDURE — 97116 GAIT TRAINING THERAPY: CPT

## 2020-05-16 PROCEDURE — 87077 CULTURE AEROBIC IDENTIFY: CPT

## 2020-05-16 PROCEDURE — 83735 ASSAY OF MAGNESIUM: CPT

## 2020-05-16 PROCEDURE — 87147 CULTURE TYPE IMMUNOLOGIC: CPT

## 2020-05-16 PROCEDURE — 36415 COLL VENOUS BLD VENIPUNCTURE: CPT

## 2020-05-16 PROCEDURE — 87186 SC STD MICRODIL/AGAR DIL: CPT

## 2020-05-16 PROCEDURE — 2580000003 HC RX 258: Performed by: INTERNAL MEDICINE

## 2020-05-16 PROCEDURE — 99232 SBSQ HOSP IP/OBS MODERATE 35: CPT | Performed by: INTERNAL MEDICINE

## 2020-05-16 PROCEDURE — 87070 CULTURE OTHR SPECIMN AEROBIC: CPT

## 2020-05-16 PROCEDURE — 6370000000 HC RX 637 (ALT 250 FOR IP): Performed by: NURSE PRACTITIONER

## 2020-05-16 PROCEDURE — 87205 SMEAR GRAM STAIN: CPT

## 2020-05-16 PROCEDURE — 1210000000 HC MED SURG R&B

## 2020-05-16 PROCEDURE — 6360000002 HC RX W HCPCS: Performed by: NURSE PRACTITIONER

## 2020-05-16 RX ORDER — OXYCODONE HYDROCHLORIDE AND ACETAMINOPHEN 5; 325 MG/1; MG/1
2 TABLET ORAL EVERY 4 HOURS PRN
Status: DISCONTINUED | OUTPATIENT
Start: 2020-05-16 | End: 2020-05-17

## 2020-05-16 RX ORDER — OXYCODONE HYDROCHLORIDE AND ACETAMINOPHEN 5; 325 MG/1; MG/1
1 TABLET ORAL EVERY 4 HOURS PRN
Status: DISCONTINUED | OUTPATIENT
Start: 2020-05-16 | End: 2020-05-17

## 2020-05-16 RX ADMIN — CEFTRIAXONE SODIUM 1 G: 1 INJECTION, POWDER, FOR SOLUTION INTRAMUSCULAR; INTRAVENOUS at 16:15

## 2020-05-16 RX ADMIN — Medication 10 ML: at 08:53

## 2020-05-16 RX ADMIN — OXYCODONE HYDROCHLORIDE AND ACETAMINOPHEN 2 TABLET: 5; 325 TABLET ORAL at 00:26

## 2020-05-16 RX ADMIN — OXYCODONE HYDROCHLORIDE AND ACETAMINOPHEN 2 TABLET: 5; 325 TABLET ORAL at 18:44

## 2020-05-16 RX ADMIN — ENOXAPARIN SODIUM 40 MG: 40 INJECTION SUBCUTANEOUS at 08:51

## 2020-05-16 RX ADMIN — OXYCODONE HYDROCHLORIDE AND ACETAMINOPHEN 2 TABLET: 5; 325 TABLET ORAL at 14:22

## 2020-05-16 RX ADMIN — OXYCODONE HYDROCHLORIDE AND ACETAMINOPHEN 2 TABLET: 5; 325 TABLET ORAL at 06:30

## 2020-05-16 RX ADMIN — DOCUSATE SODIUM 50MG AND SENNOSIDES 8.6MG 1 TABLET: 8.6; 5 TABLET, FILM COATED ORAL at 08:51

## 2020-05-16 RX ADMIN — LEVOTHYROXINE SODIUM 75 MCG: 75 TABLET ORAL at 06:30

## 2020-05-16 RX ADMIN — FAMOTIDINE 20 MG: 20 TABLET ORAL at 21:08

## 2020-05-16 RX ADMIN — FAMOTIDINE 20 MG: 20 TABLET ORAL at 08:52

## 2020-05-16 RX ADMIN — Medication 10 ML: at 21:08

## 2020-05-16 ASSESSMENT — PAIN DESCRIPTION - LOCATION: LOCATION: THROAT;CHEST

## 2020-05-16 ASSESSMENT — PAIN SCALES - GENERAL
PAINLEVEL_OUTOF10: 7
PAINLEVEL_OUTOF10: 7
PAINLEVEL_OUTOF10: 9
PAINLEVEL_OUTOF10: 0
PAINLEVEL_OUTOF10: 7
PAINLEVEL_OUTOF10: 7

## 2020-05-16 ASSESSMENT — ENCOUNTER SYMPTOMS
GASTROINTESTINAL NEGATIVE: 1
RESPIRATORY NEGATIVE: 1
COLOR CHANGE: 1

## 2020-05-16 ASSESSMENT — PAIN DESCRIPTION - PAIN TYPE: TYPE: ACUTE PAIN

## 2020-05-16 NOTE — PROGRESS NOTES
Infectious Disease     Patient Name: Lenore Parikh  Date: 5/16/2020  YOB: 1960  Medical Record Number: [id number]    Sternoclavicular joint septic arthritis  Sternoclavicular osteomyelitis  Cellulitis chest      History of Present Illness:  Throat cancer chronic trach      Patient presents with increasing falls weakness nonverbal when EMS first evaluated patient currently patient had been refusing to come to the hospital initially  ER note reports patient being oriented by the time he arrived to the emergency room  Complains of weakness left leg pain    Patient found to have fevers 101.5 on admission    Lactic acid 2.3  Urinalysis negative  Count 14,600 lymphopenia    Reported to have redness warmth swelling of the anterior chest    Chest x-ray shows no infiltrate      Review of Systems   Constitutional: Positive for fever. Negative for fatigue. Respiratory: Negative. Cardiovascular: Negative. Gastrointestinal: Negative. Skin: Positive for color change. Physical Exam   Cardiovascular: Normal heart sounds. No murmur heard. Pulmonary/Chest: Effort normal and breath sounds normal. No respiratory distress. He has no wheezes. He has no rales. Abdominal: Soft. Bowel sounds are normal. He exhibits no distension and no mass. There is no abdominal tenderness. Blood pressure (!) 98/57, pulse 69, temperature 99.2 °F (37.3 °C), temperature source Oral, resp. rate 20, height 5' 5\" (1.651 m), weight 140 lb (63.5 kg), SpO2 95 %.       .   Lab Results   Component Value Date    WBC 7.9 05/16/2020    HGB 13.0 (L) 05/16/2020    HCT 39.3 (L) 05/16/2020    MCV 95.0 05/16/2020     05/16/2020     Lab Results   Component Value Date     05/16/2020    K 3.3 05/16/2020     05/16/2020    CO2 21 05/16/2020    BUN 12 05/16/2020    CREATININE 0.57 05/16/2020    GLUCOSE 77 05/16/2020    CALCIUM 9.1 05/16/2020      Culture, Blood 1 [[id number]] (Abnormal) Collected: 05/14/20 0300

## 2020-05-16 NOTE — PROGRESS NOTES
127*  --  138 136   K Not Done 3.3* 3.6 3.3*   CL 94*  --  106 101   CO2 21  --  20 21   BUN 11  --  13 12   CREATININE 0.65*  --  0.56* 0.57*   CALCIUM 9.2  --  8.5 9.1     Recent Labs     05/14/20  0130 05/15/20  0532   AST 62* 28   ALT <5 34   BILITOT 0.7 0.4   ALKPHOS 58 50     No results for input(s): INR in the last 72 hours. No results for input(s): [place] in the last 72 hours. Urinalysis:      Lab Results   Component Value Date    NITRU Negative 05/14/2020    WBCUA 0-2 05/14/2020    BACTERIA Negative 05/14/2020    RBCUA 0-2 05/14/2020    BLOODU Negative 05/14/2020    SPECGRAV 1.025 05/14/2020    GLUCOSEU Negative 05/14/2020     Radiology:  RADIOLOGY REPORT   Final Result      XR FEMUR LEFT (MIN 2 VIEWS)   Final Result      No acute osseous abnormality. XR TIBIA FIBULA LEFT (2 VIEWS)   Final Result      No acute osseous abnormality. XR CHEST PORTABLE   Final Result   NO ACUTE CARDIOPULMONARY DISEASE. CT Head WO Contrast   Final Result   Impression:      Left sphenoid sinusitis. All CT scans at this facility use dose modulation, iterative reconstruction, and/or weight based dosing when appropriate to reduce radiation dose to as low as reasonably achievable.         Assessment/Plan:    #Sepsis POA secondary to cellulitis and strep bacteremia POA and septic arthritis of sternoclavicular joint with probable OM POA    - Continue IV Abx per ID; will need at least 6 weeks IV Abx    - ID will reassess Monday; may need Thoracic surgery consultation     - Eventual plan is to complete IV Abx at [facility] then transition to home with hospice     #Laryngeal carcinoma       - Patient has a trach in place; doesn't follow with oncology; has minimal care        - ENT has seen him today and plan on evaluating    #Frequent falls; Weakness     - PT/OT    #GERD     - Continue pepcid    #Hyponatremia     - Resolved    Active Hospital Problems    Diagnosis Date Noted    Cellulitis

## 2020-05-16 NOTE — PROGRESS NOTES
Physical Therapy Med Surg Daily Treatment Note  Facility/Department: [address] [place] [address] [address]  Room: [place] [address]       NAME: En Justin  : 1960 (60 y.o.)  MRN: [id number]  CODE STATUS: Full Code    Date of Service: 2020    Patient Diagnosis(es): Cellulitis [L03.90]  Cellulitis [L03.90]   Chief Complaint   Patient presents with    Extremity Weakness     BLE     Patient Active Problem List    Diagnosis Date Noted    Cellulitis 2020    Frequent falls 2020    Weakness 2020    Laryngeal carcinoma ([facility] [address] 2020    Gastroesophageal reflux 2020        Past Medical History:   Diagnosis Date    Hepatitis B     Hepatitis C     Hypothyroid     Other specified disorders of brain     diagnosed with [facility] Brain in      History reviewed. No pertinent surgical history. Restrictions  Restrictions/Precautions: ([place] ([place]), tracheostomy)    SUBJECTIVE   General  Chart Reviewed: Yes  Family / Caregiver Present: No  Subjective  Subjective: Pt utilizes head nods, and gestures to communicate. Pt reports recent weakness with L >R and knee buckling reported    Pre-Session Pain Report  Pre Treatment Pain Screening  Pain at present: 7  Scale Used: Numeric Score  Intervention List: Patient able to continue with treatment  Pain Screening  Patient Currently in Pain: Yes       Post-Session Pain Report  Pain Assessment  Pain Assessment: 0-10  Pain Level: 7  Pain Type: Acute pain  Pain Location: Throat; Chest         OBJECTIVE        Bed mobility  Supine to Sit: Stand by assistance  Sit to Supine: (DNT pt into chair.)  Comment: HOB elevated per orders. Transfers  Sit to Stand: Contact guard assistance  Stand to sit: Contact guard assistance  Bed to Chair: Maximum assistance;Minimal assistance(without AD pt is a max A for transferring to the chair. Min A with [employer]. )  Comment: increased time to complete, vc's for safe hand placement and sequencing.      Ambulation  Ambulation?: complete the activity  Maximal assistance = pt performs 25% of the activity; assistance is required to complete the activity  Dependent = pt requires total physical assistance to accomplish the task

## 2020-05-17 ENCOUNTER — APPOINTMENT (OUTPATIENT)
Dept: MRI IMAGING | Age: 60
DRG: 872 | End: 2020-05-17
Payer: MEDICARE

## 2020-05-17 LAB
ANION GAP SERPL CALCULATED.3IONS-SCNC: 13 MEQ/L (ref 9–15)
BASOPHILS ABSOLUTE: 0 K/UL (ref 0–0.2)
BASOPHILS RELATIVE PERCENT: 0.2 %
BUN BLDV-MCNC: 10 MG/DL (ref 8–23)
CALCIUM SERPL-MCNC: 8.6 MG/DL (ref 8.5–9.9)
CHLORIDE BLD-SCNC: 100 MEQ/L (ref 95–107)
CO2: 21 MEQ/L (ref 20–31)
CREAT SERPL-MCNC: 0.44 MG/DL (ref 0.7–1.2)
EOSINOPHILS ABSOLUTE: 0.1 K/UL (ref 0–0.7)
EOSINOPHILS RELATIVE PERCENT: 1.9 %
GFR AFRICAN AMERICAN: >60
GFR NON-AFRICAN AMERICAN: >60
GLUCOSE BLD-MCNC: 85 MG/DL (ref 70–99)
HCT VFR BLD CALC: 34.8 % (ref 42–52)
HEMOGLOBIN: 11.7 G/DL (ref 14–18)
LYMPHOCYTES ABSOLUTE: 0.5 K/UL (ref 1–4.8)
LYMPHOCYTES RELATIVE PERCENT: 6.5 %
MAGNESIUM: 1.8 MG/DL (ref 1.7–2.4)
MCH RBC QN AUTO: 31.8 PG (ref 27–31.3)
MCHC RBC AUTO-ENTMCNC: 33.7 % (ref 33–37)
MCV RBC AUTO: 94.4 FL (ref 80–100)
MONOCYTES ABSOLUTE: 0.6 K/UL (ref 0.2–0.8)
MONOCYTES RELATIVE PERCENT: 7.6 %
NEUTROPHILS ABSOLUTE: 6.7 K/UL (ref 1.4–6.5)
NEUTROPHILS RELATIVE PERCENT: 83.8 %
PDW BLD-RTO: 15.4 % (ref 11.5–14.5)
PLATELET # BLD: 291 K/UL (ref 130–400)
POTASSIUM REFLEX MAGNESIUM: 3.3 MEQ/L (ref 3.4–4.9)
RBC # BLD: 3.68 M/UL (ref 4.7–6.1)
SODIUM BLD-SCNC: 134 MEQ/L (ref 135–144)
WBC # BLD: 8 K/UL (ref 4.8–10.8)

## 2020-05-17 PROCEDURE — 6360000002 HC RX W HCPCS: Performed by: INTERNAL MEDICINE

## 2020-05-17 PROCEDURE — 6370000000 HC RX 637 (ALT 250 FOR IP): Performed by: ANESTHESIOLOGY

## 2020-05-17 PROCEDURE — 1210000000 HC MED SURG R&B

## 2020-05-17 PROCEDURE — 6370000000 HC RX 637 (ALT 250 FOR IP): Performed by: NURSE PRACTITIONER

## 2020-05-17 PROCEDURE — 6360000004 HC RX CONTRAST MEDICATION: Performed by: INTERNAL MEDICINE

## 2020-05-17 PROCEDURE — 36415 COLL VENOUS BLD VENIPUNCTURE: CPT

## 2020-05-17 PROCEDURE — 6370000000 HC RX 637 (ALT 250 FOR IP): Performed by: INTERNAL MEDICINE

## 2020-05-17 PROCEDURE — 71552 MRI CHEST W/O & W/DYE: CPT

## 2020-05-17 PROCEDURE — 83735 ASSAY OF MAGNESIUM: CPT

## 2020-05-17 PROCEDURE — 80048 BASIC METABOLIC PNL TOTAL CA: CPT

## 2020-05-17 PROCEDURE — 2580000003 HC RX 258: Performed by: NURSE PRACTITIONER

## 2020-05-17 PROCEDURE — 6360000002 HC RX W HCPCS: Performed by: ANESTHESIOLOGY

## 2020-05-17 PROCEDURE — 99232 SBSQ HOSP IP/OBS MODERATE 35: CPT | Performed by: INTERNAL MEDICINE

## 2020-05-17 PROCEDURE — 6360000002 HC RX W HCPCS: Performed by: NURSE PRACTITIONER

## 2020-05-17 PROCEDURE — 85025 COMPLETE CBC W/AUTO DIFF WBC: CPT

## 2020-05-17 PROCEDURE — A9579 GAD-BASE MR CONTRAST NOS,1ML: HCPCS | Performed by: INTERNAL MEDICINE

## 2020-05-17 PROCEDURE — 2580000003 HC RX 258: Performed by: INTERNAL MEDICINE

## 2020-05-17 RX ORDER — SODIUM CHLORIDE 0.9 % (FLUSH) 0.9 %
10 SYRINGE (ML) INJECTION 2 TIMES DAILY
Status: DISCONTINUED | OUTPATIENT
Start: 2020-05-17 | End: 2020-05-19 | Stop reason: HOSPADM

## 2020-05-17 RX ORDER — KETOROLAC TROMETHAMINE 15 MG/ML
15 INJECTION, SOLUTION INTRAMUSCULAR; INTRAVENOUS EVERY 8 HOURS SCHEDULED
Status: COMPLETED | OUTPATIENT
Start: 2020-05-17 | End: 2020-05-18

## 2020-05-17 RX ORDER — LIDOCAINE 40 MG/G
CREAM TOPICAL EVERY 4 HOURS PRN
Status: DISCONTINUED | OUTPATIENT
Start: 2020-05-17 | End: 2020-05-19

## 2020-05-17 RX ORDER — GABAPENTIN 100 MG/1
100 CAPSULE ORAL 3 TIMES DAILY
Status: DISCONTINUED | OUTPATIENT
Start: 2020-05-17 | End: 2020-05-19

## 2020-05-17 RX ORDER — ANALGESIC BALM 1.74; 4.06 G/29G; G/29G
OINTMENT TOPICAL 4 TIMES DAILY
Status: DISCONTINUED | OUTPATIENT
Start: 2020-05-17 | End: 2020-05-19 | Stop reason: HOSPADM

## 2020-05-17 RX ORDER — OXYCODONE HYDROCHLORIDE 5 MG/1
5 TABLET ORAL EVERY 4 HOURS PRN
Status: DISCONTINUED | OUTPATIENT
Start: 2020-05-17 | End: 2020-05-19

## 2020-05-17 RX ORDER — ACETAMINOPHEN 325 MG/1
650 TABLET ORAL 4 TIMES DAILY
Status: DISCONTINUED | OUTPATIENT
Start: 2020-05-17 | End: 2020-05-19

## 2020-05-17 RX ORDER — OXYCODONE HYDROCHLORIDE 5 MG/1
10 TABLET ORAL EVERY 4 HOURS PRN
Status: DISCONTINUED | OUTPATIENT
Start: 2020-05-17 | End: 2020-05-19

## 2020-05-17 RX ADMIN — MENTHOL AND METHYL SALICYLATE: 7.6; 29 OINTMENT TOPICAL at 21:09

## 2020-05-17 RX ADMIN — ENOXAPARIN SODIUM 40 MG: 40 INJECTION SUBCUTANEOUS at 08:12

## 2020-05-17 RX ADMIN — KETOROLAC TROMETHAMINE 15 MG: 15 INJECTION, SOLUTION INTRAMUSCULAR; INTRAVENOUS at 12:44

## 2020-05-17 RX ADMIN — OXYCODONE HYDROCHLORIDE AND ACETAMINOPHEN 2 TABLET: 5; 325 TABLET ORAL at 08:13

## 2020-05-17 RX ADMIN — OXYCODONE HYDROCHLORIDE AND ACETAMINOPHEN 2 TABLET: 5; 325 TABLET ORAL at 03:25

## 2020-05-17 RX ADMIN — OXYCODONE HYDROCHLORIDE 5 MG: 5 TABLET ORAL at 17:09

## 2020-05-17 RX ADMIN — OXYCODONE HYDROCHLORIDE 10 MG: 5 TABLET ORAL at 16:56

## 2020-05-17 RX ADMIN — FAMOTIDINE 20 MG: 20 TABLET ORAL at 08:13

## 2020-05-17 RX ADMIN — Medication 10 ML: at 08:13

## 2020-05-17 RX ADMIN — ACETAMINOPHEN 650 MG: 325 TABLET ORAL at 12:45

## 2020-05-17 RX ADMIN — DOCUSATE SODIUM 50MG AND SENNOSIDES 8.6MG 1 TABLET: 8.6; 5 TABLET, FILM COATED ORAL at 08:13

## 2020-05-17 RX ADMIN — GABAPENTIN 100 MG: 100 CAPSULE ORAL at 21:08

## 2020-05-17 RX ADMIN — OXYCODONE HYDROCHLORIDE 10 MG: 5 TABLET ORAL at 12:45

## 2020-05-17 RX ADMIN — CEFTRIAXONE SODIUM 1 G: 1 INJECTION, POWDER, FOR SOLUTION INTRAMUSCULAR; INTRAVENOUS at 16:56

## 2020-05-17 RX ADMIN — ACETAMINOPHEN 650 MG: 325 TABLET ORAL at 21:08

## 2020-05-17 RX ADMIN — GABAPENTIN 100 MG: 100 CAPSULE ORAL at 12:45

## 2020-05-17 RX ADMIN — Medication 10 ML: at 21:16

## 2020-05-17 RX ADMIN — LIDOCAINE: 4 CREAM TOPICAL at 16:56

## 2020-05-17 RX ADMIN — KETOROLAC TROMETHAMINE 15 MG: 15 INJECTION, SOLUTION INTRAMUSCULAR; INTRAVENOUS at 21:08

## 2020-05-17 RX ADMIN — MENTHOL AND METHYL SALICYLATE: 7.6; 29 OINTMENT TOPICAL at 16:57

## 2020-05-17 RX ADMIN — LEVOTHYROXINE SODIUM 75 MCG: 75 TABLET ORAL at 06:09

## 2020-05-17 RX ADMIN — GADOTERIDOL 15 ML: 279.3 INJECTION, SOLUTION INTRAVENOUS at 16:20

## 2020-05-17 RX ADMIN — FAMOTIDINE 20 MG: 20 TABLET ORAL at 21:07

## 2020-05-17 ASSESSMENT — ENCOUNTER SYMPTOMS
CHEST TIGHTNESS: 1
COUGH: 0
EYES NEGATIVE: 1
CHOKING: 1
RHINORRHEA: 0
APNEA: 0
BACK PAIN: 1
SINUS PAIN: 0
SINUS PRESSURE: 0
TROUBLE SWALLOWING: 1
FACIAL SWELLING: 1
COLOR CHANGE: 1
SHORTNESS OF BREATH: 1
VOICE CHANGE: 1
SORE THROAT: 1
RESPIRATORY NEGATIVE: 1
ALLERGIC/IMMUNOLOGIC NEGATIVE: 1
WHEEZING: 1
GASTROINTESTINAL NEGATIVE: 1
STRIDOR: 1

## 2020-05-17 ASSESSMENT — PAIN SCALES - GENERAL
PAINLEVEL_OUTOF10: 7
PAINLEVEL_OUTOF10: 8
PAINLEVEL_OUTOF10: 9
PAINLEVEL_OUTOF10: 0
PAINLEVEL_OUTOF10: 7
PAINLEVEL_OUTOF10: 0
PAINLEVEL_OUTOF10: 7
PAINLEVEL_OUTOF10: 7

## 2020-05-17 NOTE — PROGRESS NOTES
1905-Bedside rounding completed. Pt is a+o x 4. Pt instructed to call for assistance should he wish to get out of bed and pt verbalized understanding. Bed alarm noted to be on for pt safety. Pt denies pain of any kind and is resting comfortably in bed. Call bell and items are with in reach of pt. Will continue to monitor.       2108-Assessment completed, see Epic charting. PM medication given according to [facility]. Pt instructed to call for assistance should he wish to get out of bed and pt verbalized understanding. Bed alarm noted to be on for pt safety. Pt denies pain of any kind and is resting comfortably in bed. Call bell and items are with in reach of pt. Will continue to monitor.

## 2020-05-17 NOTE — PROGRESS NOTES
Infectious Disease     Patient Name: Kaitlin Tiwari  Date: 5/17/2020  YOB: 1960  Medical Record Number: [id number]    Sternoclavicular joint septic arthritis  Sternoclavicular osteomyelitis  Cellulitis chest      Throat cancer chronic trach      Patient presents with increasing falls weakness nonverbal when EMS first evaluated patient currently patient had been refusing to come to the hospital initially  ER note reports patient being oriented by the time he arrived to the emergency room  Complains of weakness left leg pain    Patient found to have fevers 101.5 on admission      Reported to have redness warmth swelling of the anterior chest    Chest x-ray shows no infiltrate      Review of Systems   Constitutional: Positive for fever. Negative for fatigue. Respiratory: Negative. Cardiovascular: Negative. Gastrointestinal: Negative. Skin: Positive for color change. Physical Exam   Cardiovascular: Normal heart sounds. No murmur heard. Pulmonary/Chest: Effort normal and breath sounds normal. No respiratory distress. He has no wheezes. He has no rales. Abdominal: Soft. Bowel sounds are normal. He exhibits no distension and no mass. There is no abdominal tenderness. Blood pressure (!) 99/52, pulse 69, temperature 98.7 °F (37.1 °C), temperature source Oral, resp. rate 20, height 5' 5\" (1.651 m), weight 140 lb (63.5 kg), SpO2 97 %.       .   Lab Results   Component Value Date    WBC 8.0 05/17/2020    HGB 11.7 (L) 05/17/2020    HCT 34.8 (L) 05/17/2020    MCV 94.4 05/17/2020     05/17/2020     Lab Results   Component Value Date     05/17/2020    K 3.3 05/17/2020     05/17/2020    CO2 21 05/17/2020    BUN 10 05/17/2020    CREATININE 0.44 05/17/2020    GLUCOSE 85 05/17/2020    CALCIUM 8.6 05/17/2020      Culture, Blood 1 [[id number]] (Abnormal) Collected: 05/14/20 0300   Order Status: Completed Specimen: Blood Updated: 05/15/20 0706    Blood Culture, Routine 1 out of 2 blood culturesAbnormal     Organism Streptococcus species DNA DetectedAbnormal     Blood Culture, Routine --    The following organisms and resistance markers have been   tested using nucleic acid testing technology. ORGANISMS:   Staphylococcus aureus, Staphylococcus epidermidis,   Staphylococcus lugdunensis,Staphylococcus coagulase-negative,   Enterococcus faecalis, Enterococcus faecium, Listeria species,   Streptococcus pneumoniae, Streptococcus pyogenes (Gp A),   Streptococcus agalactiae (Gp B), Streptococcus anginosus gp,   Streptococcus species.    METHICILLIN RESISTANCE MARKER:   mec-A   VANCOMYCIN RESISTANCE MARKERS:   van-A   van-B     Organism Beta Strep Group C       Culture, Respiratory [[id number]] (Abnormal) Collected: 05/16/20 1206   Order Status: Completed Specimen: Sputum from Endotracheal Updated: 05/17/20 1009    Gram Stain Result Many WBC's   Rare epithelial cells   Moderate Gram positive cocci   Few Small Gram positive rods     Organism Staph aureus MSSAAbnormal     CULTURE, RESPIRATORY --    Moderate growth   Sensitivity to follow   PBP2= Negative     Organism Gram negative rodAbnormal     CULTURE, RESPIRATORY --    Light growth   ID and sensitivity to follow          ASSESSMENT:  Patient Active Problem List   Diagnosis    Cellulitis    Frequent falls    Weakness    Laryngeal carcinoma (HCC)    Gastroesophageal reflux         PLAN:    Septic arthritis of sternoclavicular joint with probable osteomyelitis   group C streptococcus from blood culture    Examination of the patient today reveals area of the sternoclavicular joint become more prominent as the focus of infection    Will need an extended course of IV antibiotic therapy may need surgical debridement     ceftriaxone    MSSA now growing from sputum if joint continues to worsen may need to add staphylococcal coverage though ceftriaxone does have reasonable MSSA coverage    MRI of joint to see extent of infection

## 2020-05-18 ENCOUNTER — APPOINTMENT (OUTPATIENT)
Dept: INTERVENTIONAL RADIOLOGY/VASCULAR | Age: 60
DRG: 872 | End: 2020-05-18
Payer: MEDICARE

## 2020-05-18 LAB
ANION GAP SERPL CALCULATED.3IONS-SCNC: 14 MEQ/L (ref 9–15)
BASOPHILS ABSOLUTE: 0 K/UL (ref 0–0.2)
BASOPHILS RELATIVE PERCENT: 0.3 %
BUN BLDV-MCNC: 11 MG/DL (ref 8–23)
CALCIUM SERPL-MCNC: 8.8 MG/DL (ref 8.5–9.9)
CHLORIDE BLD-SCNC: 100 MEQ/L (ref 95–107)
CO2: 18 MEQ/L (ref 20–31)
CREAT SERPL-MCNC: 0.49 MG/DL (ref 0.7–1.2)
CULTURE, RESPIRATORY: ABNORMAL
CULTURE, RESPIRATORY: ABNORMAL
EOSINOPHILS ABSOLUTE: 0.2 K/UL (ref 0–0.7)
EOSINOPHILS RELATIVE PERCENT: 2.5 %
GFR AFRICAN AMERICAN: >60
GFR NON-AFRICAN AMERICAN: >60
GLUCOSE BLD-MCNC: 76 MG/DL (ref 70–99)
GRAM STAIN RESULT: ABNORMAL
HCT VFR BLD CALC: 38.1 % (ref 42–52)
HEMOGLOBIN: 12.8 G/DL (ref 14–18)
LYMPHOCYTES ABSOLUTE: 0.4 K/UL (ref 1–4.8)
LYMPHOCYTES RELATIVE PERCENT: 6.1 %
MCH RBC QN AUTO: 31.5 PG (ref 27–31.3)
MCHC RBC AUTO-ENTMCNC: 33.5 % (ref 33–37)
MCV RBC AUTO: 94.1 FL (ref 80–100)
MONOCYTES ABSOLUTE: 0.6 K/UL (ref 0.2–0.8)
MONOCYTES RELATIVE PERCENT: 9 %
NEUTROPHILS ABSOLUTE: 5.2 K/UL (ref 1.4–6.5)
NEUTROPHILS RELATIVE PERCENT: 82.1 %
ORGANISM: ABNORMAL
ORGANISM: ABNORMAL
PDW BLD-RTO: 14.9 % (ref 11.5–14.5)
PLATELET # BLD: 308 K/UL (ref 130–400)
POTASSIUM REFLEX MAGNESIUM: 4 MEQ/L (ref 3.4–4.9)
RBC # BLD: 4.05 M/UL (ref 4.7–6.1)
REASON FOR REJECTION: NORMAL
REJECTED TEST: NORMAL
SODIUM BLD-SCNC: 132 MEQ/L (ref 135–144)
WBC # BLD: 6.3 K/UL (ref 4.8–10.8)

## 2020-05-18 PROCEDURE — 2580000003 HC RX 258: Performed by: NURSE PRACTITIONER

## 2020-05-18 PROCEDURE — 2580000003 HC RX 258: Performed by: INTERNAL MEDICINE

## 2020-05-18 PROCEDURE — 6370000000 HC RX 637 (ALT 250 FOR IP): Performed by: ANESTHESIOLOGY

## 2020-05-18 PROCEDURE — 97535 SELF CARE MNGMENT TRAINING: CPT

## 2020-05-18 PROCEDURE — 6370000000 HC RX 637 (ALT 250 FOR IP): Performed by: INTERNAL MEDICINE

## 2020-05-18 PROCEDURE — 6370000000 HC RX 637 (ALT 250 FOR IP): Performed by: NURSE PRACTITIONER

## 2020-05-18 PROCEDURE — 36415 COLL VENOUS BLD VENIPUNCTURE: CPT

## 2020-05-18 PROCEDURE — 85025 COMPLETE CBC W/AUTO DIFF WBC: CPT

## 2020-05-18 PROCEDURE — 2709999900 IR PICC WO SQ PORT/PUMP > 5 YEARS

## 2020-05-18 PROCEDURE — 1210000000 HC MED SURG R&B

## 2020-05-18 PROCEDURE — 6360000002 HC RX W HCPCS: Performed by: INTERNAL MEDICINE

## 2020-05-18 PROCEDURE — 6360000002 HC RX W HCPCS: Performed by: ANESTHESIOLOGY

## 2020-05-18 PROCEDURE — 2500000003 HC RX 250 WO HCPCS: Performed by: INTERNAL MEDICINE

## 2020-05-18 PROCEDURE — 80048 BASIC METABOLIC PNL TOTAL CA: CPT

## 2020-05-18 PROCEDURE — 99232 SBSQ HOSP IP/OBS MODERATE 35: CPT | Performed by: INTERNAL MEDICINE

## 2020-05-18 RX ORDER — SODIUM CHLORIDE 0.9 % (FLUSH) 0.9 %
10 SYRINGE (ML) INJECTION PRN
Status: DISCONTINUED | OUTPATIENT
Start: 2020-05-18 | End: 2020-05-19 | Stop reason: HOSPADM

## 2020-05-18 RX ORDER — SODIUM CHLORIDE 0.9 % (FLUSH) 0.9 %
10 SYRINGE (ML) INJECTION EVERY 12 HOURS SCHEDULED
Status: DISCONTINUED | OUTPATIENT
Start: 2020-05-18 | End: 2020-05-19 | Stop reason: HOSPADM

## 2020-05-18 RX ORDER — CEFAZOLIN SODIUM 1 G/3ML
2 INJECTION, POWDER, FOR SOLUTION INTRAMUSCULAR; INTRAVENOUS EVERY 8 HOURS
Qty: 1000 MG | Refills: 0 | Status: ON HOLD | DISCHARGE
Start: 2020-05-18 | End: 2020-06-08

## 2020-05-18 RX ORDER — CEFAZOLIN SODIUM 2 G/50ML
2 SOLUTION INTRAVENOUS EVERY 8 HOURS
Status: DISCONTINUED | OUTPATIENT
Start: 2020-05-18 | End: 2020-05-19

## 2020-05-18 RX ORDER — LIDOCAINE HYDROCHLORIDE 20 MG/ML
5 INJECTION, SOLUTION INFILTRATION; PERINEURAL ONCE
Status: COMPLETED | OUTPATIENT
Start: 2020-05-18 | End: 2020-05-18

## 2020-05-18 RX ORDER — SODIUM CHLORIDE 9 MG/ML
250 INJECTION, SOLUTION INTRAVENOUS ONCE
Status: COMPLETED | OUTPATIENT
Start: 2020-05-18 | End: 2020-05-18

## 2020-05-18 RX ADMIN — OXYCODONE HYDROCHLORIDE 10 MG: 5 TABLET ORAL at 23:15

## 2020-05-18 RX ADMIN — ACETAMINOPHEN 650 MG: 325 TABLET ORAL at 14:11

## 2020-05-18 RX ADMIN — MENTHOL AND METHYL SALICYLATE: 7.6; 29 OINTMENT TOPICAL at 14:14

## 2020-05-18 RX ADMIN — OXYCODONE HYDROCHLORIDE 10 MG: 5 TABLET ORAL at 14:11

## 2020-05-18 RX ADMIN — KETOROLAC TROMETHAMINE 15 MG: 15 INJECTION, SOLUTION INTRAMUSCULAR; INTRAVENOUS at 06:12

## 2020-05-18 RX ADMIN — LIDOCAINE HYDROCHLORIDE 5 ML: 20 INJECTION, SOLUTION INFILTRATION; PERINEURAL at 16:36

## 2020-05-18 RX ADMIN — Medication 10 ML: at 20:26

## 2020-05-18 RX ADMIN — GABAPENTIN 100 MG: 100 CAPSULE ORAL at 10:21

## 2020-05-18 RX ADMIN — GABAPENTIN 100 MG: 100 CAPSULE ORAL at 20:25

## 2020-05-18 RX ADMIN — MENTHOL AND METHYL SALICYLATE: 7.6; 29 OINTMENT TOPICAL at 20:28

## 2020-05-18 RX ADMIN — Medication 10 ML: at 10:09

## 2020-05-18 RX ADMIN — SODIUM CHLORIDE 250 ML: 9 INJECTION, SOLUTION INTRAVENOUS at 16:36

## 2020-05-18 RX ADMIN — ACETAMINOPHEN 650 MG: 325 TABLET ORAL at 18:03

## 2020-05-18 RX ADMIN — OXYCODONE HYDROCHLORIDE 10 MG: 5 TABLET ORAL at 10:20

## 2020-05-18 RX ADMIN — ACETAMINOPHEN 650 MG: 325 TABLET ORAL at 10:10

## 2020-05-18 RX ADMIN — ACETAMINOPHEN 650 MG: 325 TABLET ORAL at 20:25

## 2020-05-18 RX ADMIN — OXYCODONE HYDROCHLORIDE 10 MG: 5 TABLET ORAL at 18:34

## 2020-05-18 RX ADMIN — DOCUSATE SODIUM 50MG AND SENNOSIDES 8.6MG 1 TABLET: 8.6; 5 TABLET, FILM COATED ORAL at 10:21

## 2020-05-18 RX ADMIN — FAMOTIDINE 20 MG: 20 TABLET ORAL at 10:21

## 2020-05-18 RX ADMIN — FAMOTIDINE 20 MG: 20 TABLET ORAL at 20:26

## 2020-05-18 RX ADMIN — CEFAZOLIN SODIUM 2 G: 2 SOLUTION INTRAVENOUS at 17:58

## 2020-05-18 RX ADMIN — LEVOTHYROXINE SODIUM 75 MCG: 75 TABLET ORAL at 06:11

## 2020-05-18 RX ADMIN — GABAPENTIN 100 MG: 100 CAPSULE ORAL at 14:12

## 2020-05-18 ASSESSMENT — PAIN SCALES - GENERAL
PAINLEVEL_OUTOF10: 9
PAINLEVEL_OUTOF10: 7
PAINLEVEL_OUTOF10: 7
PAINLEVEL_OUTOF10: 0
PAINLEVEL_OUTOF10: 5
PAINLEVEL_OUTOF10: 7
PAINLEVEL_OUTOF10: 0
PAINLEVEL_OUTOF10: 0
PAINLEVEL_OUTOF10: 7
PAINLEVEL_OUTOF10: 9
PAINLEVEL_OUTOF10: 7
PAINLEVEL_OUTOF10: 0
PAINLEVEL_OUTOF10: 7
PAINLEVEL_OUTOF10: 8
PAINLEVEL_OUTOF10: 7
PAINLEVEL_OUTOF10: 9

## 2020-05-18 ASSESSMENT — PAIN DESCRIPTION - PAIN TYPE
TYPE: ACUTE PAIN
TYPE: ACUTE PAIN

## 2020-05-18 ASSESSMENT — ENCOUNTER SYMPTOMS
GASTROINTESTINAL NEGATIVE: 1
SORE THROAT: 1
ABDOMINAL DISTENTION: 0
VOICE CHANGE: 1
COLOR CHANGE: 1
RESPIRATORY NEGATIVE: 1
BACK PAIN: 1
EYES NEGATIVE: 1
TROUBLE SWALLOWING: 1
FACIAL SWELLING: 1

## 2020-05-18 ASSESSMENT — PAIN DESCRIPTION - ORIENTATION: ORIENTATION: LEFT

## 2020-05-18 ASSESSMENT — PAIN DESCRIPTION - LOCATION
LOCATION: LEG;THROAT
LOCATION: THROAT;NECK

## 2020-05-18 ASSESSMENT — PAIN DESCRIPTION - DESCRIPTORS
DESCRIPTORS: THROBBING
DESCRIPTORS: ACHING

## 2020-05-18 NOTE — DISCHARGE INSTR - COC
Continuity of Care Form    Patient Name: Lenore Parikh   :  1960  MRN:  [id number]    Admit date:  2020  Discharge date:  20    Code Status Order: Full Code   Advance Directives:   Advance Care Flowsheet Documentation     Date/Time Healthcare Directive Type of Healthcare Directive Copy in [address] Agent's Name Healthcare Agent's Phone Number    05/15/20 1212  Yes, patient has an advance directive for healthcare treatment  Durable power of  for health care;Living will  Yes, copy in chart  Healthcare power of   Ghada Alvarezt  [phone]    20 1500  Yes, patient has an advance directive for healthcare treatment  Durable power of  for health care  No, copy requested from family Brother has documents  Adult siblings  Radha Germaine  [phone]    20 0547  Yes, patient has an advance directive for healthcare treatment  Durable power of  for health care  No, copy requested from family  Healthcare power of   Radha Germaine  [phone]          Admitting Physician:  Coleman Almanzar MD  PCP: No primary care provider on file. Discharging Nurse: Kayla Bass [facility] Unit/Room#: T704/H238-14  Discharging Unit Phone Number: [phone]    Emergency Contact:   Extended Emergency Contact Information  Primary Emergency Contact: Katerine Henson  Address: [address] [place] [address] Phone: [phone]  Mobile Phone: [phone]  Relation: Child  Preferred language: English   needed? No  Secondary Emergency Contact: [address] [facility], [address] [place] [address] Phone: [phone]  Relation: Other  Preferred language: English   needed? No    Past Surgical History:  History reviewed. No pertinent surgical history. Immunization History: There is no immunization history on file for this patient.     Active Problems:  Patient Active Problem List   Diagnosis Code    Cellulitis L03.90    Frequent falls R29.6    Weakness R53.1    Laryngeal carcinoma (HCC) C32.9    Gastroesophageal reflux K21.9       Isolation/Infection:   Isolation          No Isolation        Patient Infection Status     None to display          Nurse Assessment:  Last Vital Signs: /67   Pulse 71   Temp 98.6 °F (37 °C) (Oral)   Resp 20   Ht 5' 5\" (1.651 m)   Wt 140 lb (63.5 kg)   SpO2 98%   BMI 23.30 kg/m²     Last documented pain score (0-10 scale): Pain Level: 9  Last Weight:   Wt Readings from Last 1 Encounters:   05/14/20 140 lb (63.5 kg)     Mental Status:  oriented    IV Access:  #20 Right forearm, insert 05/19/20    Nursing Mobility/ADLs:  Walking   Dependent  Transfer  Assisted  Bathing  Assisted  Dressing  Assisted  Toileting  Assisted  Feeding  Independent  Med Admin  Assisted  Med Delivery   whole    Wound Care Documentation and Therapy:  Wound 05/14/20 Leg Left (Active)   Wound Skin Tear 5/15/2020 10:15 AM   Dressing/Treatment Open to air 5/15/2020 10:15 AM   Drainage Amount None 5/15/2020 10:15 AM   Number of days: 4        Elimination:  Continence:   · Bowel: Yes  · Bladder: NA  Urinary Catheter: Indication for Use of Catheter: Acute urinary retention/obstruction   Colostomy/Ileostomy/Ileal Conduit: No       Date of Last BM:   Intake/Output Summary (Last 24 hours) at 5/18/2020 1053  Last data filed at 5/18/2020 9406  Gross per 24 hour   Intake --   Output 350 ml   Net -350 ml     I/O last 3 completed shifts:  In: -   Out: 350 [Urine:350]    Safety Concerns:      At Risk for Falls    Impairments/Disabilities:      Language Barrier - ***    Nutrition Therapy:  Current Nutrition Therapy:   - Oral Diet:  General    Routes of Feeding: Oral  Liquids: Nectar Thick Liquids  Daily Fluid Restriction: no  Last Modified Barium Swallow with Video (Video Swallowing Test): not done    Treatments at the Time of Hospital Discharge:   Respiratory Treatments: none  Oxygen Therapy:  is not on home oxygen therapy. Ventilator:    - No ventilator support    Rehab Therapies: ***  Weight Bearing Status/Restrictions: No weight bearing restirctions  Other Medical Equipment (for information only, NOT a DME order):  wheelchair  Other Treatments: none    Patient's personal belongings (please select all that are sent with patient):  Glasses    RN SIGNATURE:  Electronically signed by Marvin Bruno RN on 5/19/20 at 1:10 PM EDT    CASE MANAGEMENT/SOCIAL WORK SECTION    Inpatient Status Date: 05/14/2020    Readmission Risk Assessment Score:  Readmission Risk              Risk of Unplanned Readmission:        14           Discharging to Facility/ Agency   · Name: [facility]  · Address:  · Phone:  · Fax:    Dialysis Facility (if applicable)   · Name:  · Address:  · Dialysis Schedule:  · Phone:  · Fax:    / signature: Electronically signed by NANCY Benavides on 5/18/20 at 10:53 AM EDT    PHYSICIAN SECTION    Prognosis: Fair    Condition at Discharge: Stable    Rehab Potential (if transferring to Rehab): Fair    Recommended Labs or Other Treatments After Discharge:     Physician Certification: I certify the above information and transfer of Juma Aleman  is necessary for the continuing treatment of the diagnosis listed and that he requires [facility] for greater 30 days.      Update Admission H&P: No change in H&P    PHYSICIAN SIGNATURE:  Electronically signed by Janine Hinton DO on 5/19/20 at 12:08 PM EDT

## 2020-05-18 NOTE — PROGRESS NOTES
1010-- assessment completed, patient alert and oriented x 4, unable to speak due to trach capped, will write down using paper or text on his phone his needs, lungs diminished, coughing up thick yellowish/white frothy sputum, suction performed per this nurse as requested by the patient, patient normally does this on his own, heart rate regular, bowel sounds active, redness, warmth, swelling at and around the trach site and to the left, swelling at the clavical site    1227-- Dr Floyd here, order for a picc line entered, Dr Floyd would like the ok from Dr Ladoris Saint, message sent to Dr Ladoris Saint regarding this    [phone]-- Dr Ladoris Saint currently on the unit, he will review and let me know    1300-- ok for picc line placement, I will make special procedures aware    [phone]-- special procedures called to inform this nurse they were unable to place a picc line, Crystal sent a message to Dr Floyd to make him aware and to see if he wanted something else, Lisbeth Lab is going to call this nurse back to make me aware of what the decision is

## 2020-05-18 NOTE — PROGRESS NOTES
Physical Therapy Med Surg Daily Treatment Note  [facility]Department[facility]  [facility]       NAME: Maude Brown  : 1960 (60 y.o.)  MRN: [id number]  CODE STATUS: Full Code    Date of Service: 2020    Patient Diagnosis(es): Cellulitis [L03.90]  Cellulitis [L03.90]   Chief Complaint   Patient presents with    Extremity Weakness     BLE     Patient Active Problem List    Diagnosis Date Noted    Cellulitis 2020    Frequent falls 2020    Weakness 2020    Laryngeal carcinoma ([address] 2020    Gastroesophageal reflux 2020        Past Medical History:   Diagnosis Date    Hepatitis B     Hepatitis C     Hypothyroid     Other specified disorders of brain     diagnosed with Wet Brain in 2006     History reviewed. No pertinent surgical history. Restrictions  Restrictions/Precautions: Fall Risk(Mildly [place] ([place]), tracheostomy)    SUBJECTIVE   Subjective  Subjective: Pt is non-verbal and nods head in agreement to tx. Pre-Session Pain Report  Pre Treatment Pain Screening  Pain at present: 9  Intervention List: Patient able to continue with treatment;Nurse/physician notified          Post-Session Pain Report  Pain Assessment  Pain Level: 9  Pain Type: Acute pain  Pain Location: Leg;Throat  Pain Orientation: Left  Pain Descriptors: Aching         OBJECTIVE        Bed mobility  Rolling to Right: Contact guard assistance  Supine to Sit: Contact guard assistance  Comment: HOB slightly elevated per orders; vc's for technique and slight lifting assist required. Transfers  Sit to Stand: Contact guard assistance  Stand to sit: Contact guard assistance  Bed to Chair: Minimal assistance(without AD pt is a max A for transferring to the chair. Min A with [employer]. )  Comment: increased time to complete, vc's for safe hand placement and sequencing.      Ambulation  Ambulation?: Yes  Ambulation 1  Surface: level tile  Device: Rolling Walker  Quality of Gait: flexed posture, slow fei, small steps, increased fatigue. Gait Deviations: Slow Fei; Increased ROSEMARIE; Decreased step length;Decreased step height;Decreased head and trunk rotation  Distance: 3' to chair. Activity Tolerance  Activity Tolerance: Patient Tolerated treatment well          ASSESSMENT   Assessment: Mobility improving. No knee buckling noted this date     Discharge Recommendations:  Continue to assess pending progress, Patient would benefit from continued therapy after discharge    Goals  Long term goals  Long term goal 1: bed mobility with indep   Long term goal 2: functional transfers with indep   Long term goal 3: amb 50ft with LRAD and indep   Long term goal 4: 1 curb step with AD and SBA to enter/exit home  Long term goal 5: indep with HEP to improve LE strength,   Patient Goals   Patient goals : \"get stronger\"    PLAN    Plan  Plan Comment: Cont. POC  Safety Devices  Type of devices: All fall risk precautions in place;Call light within reach; Chair alarm in place; Left in chair     AMPAC (6 CLICK) BASIC MOBILITY  AM-PAC Inpatient Mobility Raw Score : 16      Therapy Time   Individual   Time In 0850   Time Out 0905   Minutes 15      bm/Trsf - 10 mins  Gait 5 mins       Demi Santa PTA, 05/18/20 at 9:11 AM       Definitions for assistance levels  Independent = pt does not require any physical supervision or assistance from another person for activity completion. Device may be needed.   Stand by assistance = pt requires verbal cues or instructions from another person, close to but not touching, to perform the activity  Minimal assistance= pt performs 75% or more of the activity; assistance is required to complete the activity  Moderate assistance= pt performs 50% of the activity; assistance is required to complete the activity  Maximal assistance = pt performs 25% of the activity; assistance is required to complete the activity  Dependent = pt requires total

## 2020-05-18 NOTE — PROGRESS NOTES
Department of Internal Medicine  General Internal Medicine  Attending Progress Note      SUBJECTIVE:  Pt seen and examined. No complaints. OBJECTIVE      Medications    Current Facility-Administered Medications: lidocaine 2 % injection 5 mL, 5 mL, Intradermal, Once  sodium chloride flush 0.9 % injection 10 mL, 10 mL, Intravenous, 2 times per day  sodium chloride flush 0.9 % injection 10 mL, 10 mL, Intravenous, PRN  0.9 % sodium chloride infusion, 250 mL, Intravenous, Once  ceFAZolin (ANCEF) 2 g in dextrose 3 % 50 mL IVPB (duplex), 2 g, Intravenous, Q8H  acetaminophen (TYLENOL) tablet 650 mg, 650 mg, Oral, 4x Daily **OR** [DISCONTINUED] acetaminophen (TYLENOL) suppository 650 mg, 650 mg, Rectal, Q6H PRN  oxyCODONE (ROXICODONE) immediate release tablet 5 mg, 5 mg, Oral, Q4H PRN **OR** oxyCODONE (ROXICODONE) immediate release tablet 10 mg, 10 mg, Oral, Q4H PRN  gabapentin (NEURONTIN) capsule 100 mg, 100 mg, Oral, TID  lidocaine (LMX) 4 % cream, , Topical, Q4H PRN  analgesic ointment ointment, , Topical, 4x Daily  sodium chloride flush 0.9 % injection 10 mL, 10 mL, Intravenous, BID  levothyroxine (SYNTHROID) tablet 75 mcg, 75 mcg, Oral, Daily  senna-docusate (PERICOLACE) 8.6-50 MG per tablet 1 tablet, 1 tablet, Oral, Daily  sodium chloride flush 0.9 % injection 10 mL, 10 mL, Intravenous, 2 times per day  sodium chloride flush 0.9 % injection 10 mL, 10 mL, Intravenous, PRN  polyethylene glycol (GLYCOLAX) packet 17 g, 17 g, Oral, Daily PRN  promethazine (PHENERGAN) tablet 12.5 mg, 12.5 mg, Oral, Q6H PRN **OR** ondansetron (ZOFRAN) injection 4 mg, 4 mg, Intravenous, Q6H PRN  enoxaparin (LOVENOX) injection 40 mg, 40 mg, Subcutaneous, Daily  famotidine (PEPCID) tablet 20 mg, 20 mg, Oral, BID  Physical    VITALS:  /67   Pulse 71   Temp 98.6 °F (37 °C) (Oral)   Resp 20   Ht 5' 5\" (1.651 m)   Wt 140 lb (63.5 kg)   SpO2 98%   BMI 23.30 kg/m²   Constitutional: Awake and alert in no acute distress.  Lying in bed Resolved    Disposition: Following cultures and continuing IV abx per ID recs. Possible discharge in next 24-48 hours to [facility].       Renée Huggins DO  Internal Medicine

## 2020-05-18 NOTE — CARE COORDINATION
SHAQUILLEW spoke with Jn Kang at [facility] and they are reviewing for admission today if medically cleared for transfer. [facility] has accepted the pt and he can transfer to them when all physicians have cleared his for transfer.

## 2020-05-18 NOTE — PROGRESS NOTES
mg at 05/18/20 1834    gabapentin (NEURONTIN) capsule 100 mg  100 mg Oral TID Stevie Decker MD   100 mg at 05/18/20 1412    lidocaine (LMX) 4 % cream   Topical Q4H PRN Stevie Decker MD        analgesic ointment ointment   Topical 4x Daily Stevie Decker MD        sodium chloride flush 0.9 % injection 10 mL  10 mL Intravenous BID Megha Tanner MD   10 mL at 05/18/20 1009    levothyroxine (SYNTHROID) tablet 75 mcg  75 mcg Oral Daily Megha Tanner MD   75 mcg at 05/18/20 0611    senna-docusate (PERICOLACE) 8.6-50 MG per tablet 1 tablet  1 tablet Oral Daily Megha Tanner MD   1 tablet at 05/18/20 1021    sodium chloride flush 0.9 % injection 10 mL  10 mL Intravenous 2 times per day MEI Nevarez - CNP   10 mL at 05/17/20 2116    sodium chloride flush 0.9 % injection 10 mL  10 mL Intravenous PRN Karely STEPHANIE SotoN - CNP        polyethylene glycol (GLYCOLAX) packet 17 g  17 g Oral Daily PRN [place] Charyndpaulo, APRN - CNP        promethazine (PHENERGAN) tablet 12.5 mg  12.5 mg Oral Q6H PRN Karely Blonder, MEI - CNP        Or    [facility] injection 4 mg  4 mg Intravenous Q6H PRN Karely Blonder, APRN - CNP        enoxaparin (LOVENOX) injection 40 mg  40 mg Subcutaneous Daily MEI Huggins - CNP   Stopped at 05/18/20 1022    famotidine (PEPCID) tablet 20 mg  20 mg Oral BID Emilee Munson APRN - CNP   20 mg at 05/18/20 1021         ALLERGIES:  Patient has no known allergies. Review of Systems   Constitutional: Positive for activity change, appetite change and fatigue. Negative for chills, diaphoresis, fever and unexpected weight change. HENT: Positive for facial swelling, sore throat, trouble swallowing and voice change. Eyes: Negative. Respiratory: Negative. Cardiovascular: Negative. Gastrointestinal: Negative. Negative for abdominal distention. Endocrine: Negative. Genitourinary: Negative. 05/18/2020     05/18/2020    K 4.0 05/18/2020     05/18/2020    CO2 18 05/18/2020    BUN 11 05/18/2020    CREATININE 0.49 05/18/2020    CALCIUM 8.8 05/18/2020    ALKPHOS 50 05/15/2020    ALT 34 05/15/2020    AST 28 05/15/2020    BILITOT 0.4 05/15/2020    LABALBU 2.8 05/15/2020   LABS  Recent Labs     05/16/20  1212 05/17/20  0606 05/18/20  0629   WBC 7.9 8.0 6.3   RBC 4.14* 3.68* 4.05*   HGB 13.0* 11.7* 12.8*   HCT 39.3* 34.8* 38.1*   MCV 95.0 94.4 94.1   MCH 31.4* 31.8* 31.5*   MCHC 33.1 33.7 33.5   RDW 15.0* 15.4* 14.9*    291 308       Recent Labs     05/16/20  1212 05/17/20  0606 05/18/20  0515    134* 132*   K 3.3* 3.3* 4.0    100 100   CO2 21 21 18*   BUN 12 10 11   CREATININE 0.57* 0.44* 0.49*   GLUCOSE 77 85 76   CALCIUM 9.1 8.6 8.8       Recent Labs     05/16/20  1212 05/17/20  0606   MG 2.0 1.8         Xr Femur Left (min 2 Views)    Result Date: 5/14/2020  EXAM: XR FEMUR LEFT (MIN 2 VIEWS) HISTORY: Left leg pain after multiple falls TECHNIQUE: Frontal and lateral views of the left femur COMPARISON: None available FINDINGS: No acute abnormality of the left femur. No hip dislocation. Visualized bones of the pelvis appear intact. Surgical clips are present within the soft tissues of the proximal and mid thigh. Pendleton catheter is present. Vascular calcifications noted. No acute osseous abnormality. Xr Tibia Fibula Left (2 Views)    Result Date: 5/14/2020  EXAM:  LEFT TIBIA AND FIBULA, TWO VIEWS: REASON FOR EXAMINATION:. Multiple falls. Pain. TECHNIQUE: Frontal and lateral views of the tibia and fibula obtained. FINDINGS: No acute fracture or dislocation. Soft tissues are within normal limits. Vascular calcifications noted. No acute osseous abnormality. Ct Head Wo Contrast    Result Date: 5/14/2020  CT Brain Contrast medium:  Not utilized. History:  Fall. Hit head. History laryngeal carcinoma.  Comparison:  None Findings: Extra-axial spaces:  Normal. Intracranial course of oxycodone might be more than sufficient at this time. His pain is more neuropathic in the head and neck region and he was off all opioids for a while however pain is returning due to the active nociceptive pain due to cellulitis which could be managed with a short course of Toradol here until able to start antibiotic and should be less by resolution of the cellulitis.     SIGNATURE: Hailey Hathaway MD PATIENT NAME:  Kaitlin Tiwari   DATE: May 18, 2020 MRN: [id number]   TIME: 7:41 PM PAGER/CONTACT #: [phone]

## 2020-05-19 ENCOUNTER — HOSPITAL ENCOUNTER (INPATIENT)
Age: 60
LOS: 27 days | Discharge: HOSPICE | DRG: 548 | End: 2020-06-15
Attending: INTERNAL MEDICINE | Admitting: INTERNAL MEDICINE
Payer: MEDICARE

## 2020-05-19 VITALS
SYSTOLIC BLOOD PRESSURE: 104 MMHG | BODY MASS INDEX: 23.32 KG/M2 | WEIGHT: 140 LBS | HEIGHT: 65 IN | OXYGEN SATURATION: 97 % | DIASTOLIC BLOOD PRESSURE: 57 MMHG | HEART RATE: 72 BPM | TEMPERATURE: 98.2 F | RESPIRATION RATE: 18 BRPM

## 2020-05-19 LAB
ANION GAP SERPL CALCULATED.3IONS-SCNC: 14 MEQ/L (ref 9–15)
BASOPHILS ABSOLUTE: 0 K/UL (ref 0–0.2)
BASOPHILS RELATIVE PERCENT: 0.5 %
BUN BLDV-MCNC: 6 MG/DL (ref 8–23)
CALCIUM SERPL-MCNC: 9.1 MG/DL (ref 8.5–9.9)
CHLORIDE BLD-SCNC: 100 MEQ/L (ref 95–107)
CO2: 22 MEQ/L (ref 20–31)
CREAT SERPL-MCNC: 0.41 MG/DL (ref 0.7–1.2)
CULTURE, BLOOD 2: NORMAL
EOSINOPHILS ABSOLUTE: 0.2 K/UL (ref 0–0.7)
EOSINOPHILS RELATIVE PERCENT: 2.8 %
GFR AFRICAN AMERICAN: >60
GFR NON-AFRICAN AMERICAN: >60
GLUCOSE BLD-MCNC: 93 MG/DL (ref 70–99)
HCT VFR BLD CALC: 37.9 % (ref 42–52)
HEMOGLOBIN: 12.5 G/DL (ref 14–18)
LYMPHOCYTES ABSOLUTE: 0.6 K/UL (ref 1–4.8)
LYMPHOCYTES RELATIVE PERCENT: 9.8 %
MAGNESIUM: 2.2 MG/DL (ref 1.7–2.4)
MCH RBC QN AUTO: 31.2 PG (ref 27–31.3)
MCHC RBC AUTO-ENTMCNC: 32.9 % (ref 33–37)
MCV RBC AUTO: 94.9 FL (ref 80–100)
MONOCYTES ABSOLUTE: 0.7 K/UL (ref 0.2–0.8)
MONOCYTES RELATIVE PERCENT: 10.3 %
NEUTROPHILS ABSOLUTE: 4.9 K/UL (ref 1.4–6.5)
NEUTROPHILS RELATIVE PERCENT: 76.6 %
PDW BLD-RTO: 15.2 % (ref 11.5–14.5)
PLATELET # BLD: 325 K/UL (ref 130–400)
POTASSIUM REFLEX MAGNESIUM: 3.4 MEQ/L (ref 3.4–4.9)
RBC # BLD: 4 M/UL (ref 4.7–6.1)
SODIUM BLD-SCNC: 136 MEQ/L (ref 135–144)
WBC # BLD: 6.4 K/UL (ref 4.8–10.8)

## 2020-05-19 PROCEDURE — 85025 COMPLETE CBC W/AUTO DIFF WBC: CPT

## 2020-05-19 PROCEDURE — 6370000000 HC RX 637 (ALT 250 FOR IP): Performed by: NURSE PRACTITIONER

## 2020-05-19 PROCEDURE — 6360000002 HC RX W HCPCS: Performed by: INTERNAL MEDICINE

## 2020-05-19 PROCEDURE — 6370000000 HC RX 637 (ALT 250 FOR IP): Performed by: ANESTHESIOLOGY

## 2020-05-19 PROCEDURE — 83735 ASSAY OF MAGNESIUM: CPT

## 2020-05-19 PROCEDURE — 6370000000 HC RX 637 (ALT 250 FOR IP): Performed by: INTERNAL MEDICINE

## 2020-05-19 PROCEDURE — 1200000002 HC SEMI PRIVATE SWING BED

## 2020-05-19 PROCEDURE — 97110 THERAPEUTIC EXERCISES: CPT

## 2020-05-19 PROCEDURE — 80048 BASIC METABOLIC PNL TOTAL CA: CPT

## 2020-05-19 PROCEDURE — 99232 SBSQ HOSP IP/OBS MODERATE 35: CPT | Performed by: INTERNAL MEDICINE

## 2020-05-19 PROCEDURE — 97535 SELF CARE MNGMENT TRAINING: CPT

## 2020-05-19 PROCEDURE — 2580000003 HC RX 258: Performed by: INTERNAL MEDICINE

## 2020-05-19 PROCEDURE — 6360000002 HC RX W HCPCS: Performed by: NURSE PRACTITIONER

## 2020-05-19 PROCEDURE — 36415 COLL VENOUS BLD VENIPUNCTURE: CPT

## 2020-05-19 RX ORDER — LIDOCAINE 40 MG/G
CREAM TOPICAL EVERY 4 HOURS PRN
Status: CANCELLED | OUTPATIENT
Start: 2020-05-19

## 2020-05-19 RX ORDER — GABAPENTIN 100 MG/1
100 CAPSULE ORAL 3 TIMES DAILY
Status: DISCONTINUED | OUTPATIENT
Start: 2020-05-19 | End: 2020-05-20

## 2020-05-19 RX ORDER — ACETAMINOPHEN 325 MG/1
650 TABLET ORAL 4 TIMES DAILY
Status: DISCONTINUED | OUTPATIENT
Start: 2020-05-19 | End: 2020-06-07

## 2020-05-19 RX ORDER — AMOXICILLIN 250 MG
1 CAPSULE ORAL DAILY
Status: CANCELLED | OUTPATIENT
Start: 2020-05-20

## 2020-05-19 RX ORDER — PROMETHAZINE HYDROCHLORIDE 12.5 MG/1
12.5 TABLET ORAL EVERY 6 HOURS PRN
Status: DISCONTINUED | OUTPATIENT
Start: 2020-05-19 | End: 2020-06-15 | Stop reason: HOSPADM

## 2020-05-19 RX ORDER — POLYETHYLENE GLYCOL 3350 17 G/17G
17 POWDER, FOR SOLUTION ORAL DAILY PRN
Status: DISCONTINUED | OUTPATIENT
Start: 2020-05-19 | End: 2020-05-20

## 2020-05-19 RX ORDER — OXYCODONE HYDROCHLORIDE 5 MG/1
5 TABLET ORAL EVERY 4 HOURS PRN
Status: CANCELLED | OUTPATIENT
Start: 2020-05-19

## 2020-05-19 RX ORDER — ONDANSETRON 2 MG/ML
4 INJECTION INTRAMUSCULAR; INTRAVENOUS EVERY 6 HOURS PRN
Status: DISCONTINUED | OUTPATIENT
Start: 2020-05-19 | End: 2020-06-15 | Stop reason: HOSPADM

## 2020-05-19 RX ORDER — GABAPENTIN 100 MG/1
100 CAPSULE ORAL 3 TIMES DAILY
Status: CANCELLED | OUTPATIENT
Start: 2020-05-19

## 2020-05-19 RX ORDER — LEVOTHYROXINE SODIUM 0.07 MG/1
75 TABLET ORAL DAILY
Status: CANCELLED | OUTPATIENT
Start: 2020-05-20

## 2020-05-19 RX ORDER — FAMOTIDINE 20 MG/1
20 TABLET, FILM COATED ORAL 2 TIMES DAILY
Status: CANCELLED | OUTPATIENT
Start: 2020-05-19

## 2020-05-19 RX ORDER — ACETAMINOPHEN 325 MG/1
650 TABLET ORAL 4 TIMES DAILY
Status: CANCELLED | OUTPATIENT
Start: 2020-05-19

## 2020-05-19 RX ORDER — POLYETHYLENE GLYCOL 3350 17 G/17G
17 POWDER, FOR SOLUTION ORAL DAILY PRN
Status: CANCELLED | OUTPATIENT
Start: 2020-05-19

## 2020-05-19 RX ORDER — FAMOTIDINE 20 MG/1
20 TABLET, FILM COATED ORAL 2 TIMES DAILY
Status: DISCONTINUED | OUTPATIENT
Start: 2020-05-19 | End: 2020-06-15 | Stop reason: HOSPADM

## 2020-05-19 RX ORDER — CEFAZOLIN SODIUM 2 G/50ML
2 SOLUTION INTRAVENOUS EVERY 8 HOURS
Status: DISCONTINUED | OUTPATIENT
Start: 2020-05-19 | End: 2020-05-21

## 2020-05-19 RX ORDER — SENNA AND DOCUSATE SODIUM 50; 8.6 MG/1; MG/1
1 TABLET, FILM COATED ORAL DAILY
Status: DISCONTINUED | OUTPATIENT
Start: 2020-05-20 | End: 2020-05-20

## 2020-05-19 RX ORDER — PROMETHAZINE HYDROCHLORIDE 12.5 MG/1
12.5 TABLET ORAL EVERY 6 HOURS PRN
Status: CANCELLED | OUTPATIENT
Start: 2020-05-19

## 2020-05-19 RX ORDER — LEVOTHYROXINE SODIUM 0.07 MG/1
75 TABLET ORAL DAILY
Status: DISCONTINUED | OUTPATIENT
Start: 2020-05-20 | End: 2020-06-15 | Stop reason: HOSPADM

## 2020-05-19 RX ORDER — OXYCODONE HYDROCHLORIDE 5 MG/1
10 TABLET ORAL EVERY 4 HOURS PRN
Status: DISCONTINUED | OUTPATIENT
Start: 2020-05-19 | End: 2020-06-07

## 2020-05-19 RX ORDER — ONDANSETRON 2 MG/ML
4 INJECTION INTRAMUSCULAR; INTRAVENOUS EVERY 6 HOURS PRN
Status: CANCELLED | OUTPATIENT
Start: 2020-05-19

## 2020-05-19 RX ORDER — OXYCODONE HYDROCHLORIDE 5 MG/1
5 TABLET ORAL EVERY 4 HOURS PRN
Status: DISCONTINUED | OUTPATIENT
Start: 2020-05-19 | End: 2020-06-07

## 2020-05-19 RX ORDER — LIDOCAINE 40 MG/G
CREAM TOPICAL EVERY 4 HOURS PRN
Status: DISCONTINUED | OUTPATIENT
Start: 2020-05-19 | End: 2020-06-15 | Stop reason: HOSPADM

## 2020-05-19 RX ORDER — OXYCODONE HYDROCHLORIDE 5 MG/1
10 TABLET ORAL EVERY 4 HOURS PRN
Status: CANCELLED | OUTPATIENT
Start: 2020-05-19

## 2020-05-19 RX ORDER — CEFAZOLIN SODIUM 2 G/50ML
2 SOLUTION INTRAVENOUS EVERY 8 HOURS
Status: CANCELLED | OUTPATIENT
Start: 2020-05-19 | End: 2020-06-30

## 2020-05-19 RX ADMIN — Medication 10 ML: at 08:36

## 2020-05-19 RX ADMIN — GABAPENTIN 100 MG: 100 CAPSULE ORAL at 08:20

## 2020-05-19 RX ADMIN — CEFAZOLIN SODIUM 2 G: 2 SOLUTION INTRAVENOUS at 11:42

## 2020-05-19 RX ADMIN — GABAPENTIN 100 MG: 100 CAPSULE ORAL at 14:27

## 2020-05-19 RX ADMIN — DOCUSATE SODIUM 50MG AND SENNOSIDES 8.6MG 1 TABLET: 8.6; 5 TABLET, FILM COATED ORAL at 08:20

## 2020-05-19 RX ADMIN — ACETAMINOPHEN 650 MG: 325 TABLET ORAL at 08:19

## 2020-05-19 RX ADMIN — Medication 10 ML: at 08:28

## 2020-05-19 RX ADMIN — MENTHOL AND METHYL SALICYLATE: 7.6; 29 OINTMENT TOPICAL at 08:23

## 2020-05-19 RX ADMIN — GABAPENTIN 100 MG: 100 CAPSULE ORAL at 19:34

## 2020-05-19 RX ADMIN — ACETAMINOPHEN 650 MG: 325 TABLET ORAL at 12:17

## 2020-05-19 RX ADMIN — OXYCODONE HYDROCHLORIDE 10 MG: 5 TABLET ORAL at 07:29

## 2020-05-19 RX ADMIN — FAMOTIDINE 20 MG: 20 TABLET ORAL at 19:34

## 2020-05-19 RX ADMIN — LEVOTHYROXINE SODIUM 75 MCG: 75 TABLET ORAL at 08:36

## 2020-05-19 RX ADMIN — OXYCODONE HYDROCHLORIDE 10 MG: 5 TABLET ORAL at 03:24

## 2020-05-19 RX ADMIN — CEFAZOLIN SODIUM 2 G: 2 SOLUTION INTRAVENOUS at 20:42

## 2020-05-19 RX ADMIN — ENOXAPARIN SODIUM 40 MG: 40 INJECTION SUBCUTANEOUS at 08:19

## 2020-05-19 RX ADMIN — FAMOTIDINE 20 MG: 20 TABLET ORAL at 08:20

## 2020-05-19 RX ADMIN — OXYCODONE HYDROCHLORIDE 5 MG: 5 TABLET ORAL at 16:47

## 2020-05-19 RX ADMIN — ACETAMINOPHEN 650 MG: 325 TABLET ORAL at 16:47

## 2020-05-19 RX ADMIN — CEFAZOLIN SODIUM 2 G: 2 SOLUTION INTRAVENOUS at 02:52

## 2020-05-19 RX ADMIN — ACETAMINOPHEN 650 MG: 325 TABLET ORAL at 19:34

## 2020-05-19 RX ADMIN — Medication 10 ML: at 08:21

## 2020-05-19 RX ADMIN — OXYCODONE HYDROCHLORIDE 10 MG: 5 TABLET ORAL at 20:42

## 2020-05-19 RX ADMIN — Medication 10 ML: at 02:53

## 2020-05-19 RX ADMIN — OXYCODONE HYDROCHLORIDE 10 MG: 5 TABLET ORAL at 12:17

## 2020-05-19 ASSESSMENT — PAIN SCALES - GENERAL
PAINLEVEL_OUTOF10: 0
PAINLEVEL_OUTOF10: 0
PAINLEVEL_OUTOF10: 8
PAINLEVEL_OUTOF10: 5
PAINLEVEL_OUTOF10: 5
PAINLEVEL_OUTOF10: 7
PAINLEVEL_OUTOF10: 8
PAINLEVEL_OUTOF10: 8
PAINLEVEL_OUTOF10: 7
PAINLEVEL_OUTOF10: 0
PAINLEVEL_OUTOF10: 8
PAINLEVEL_OUTOF10: 6
PAINLEVEL_OUTOF10: 0
PAINLEVEL_OUTOF10: 0
PAINLEVEL_OUTOF10: 8

## 2020-05-19 ASSESSMENT — ENCOUNTER SYMPTOMS
RESPIRATORY NEGATIVE: 1
COLOR CHANGE: 1
GASTROINTESTINAL NEGATIVE: 1

## 2020-05-19 ASSESSMENT — PAIN DESCRIPTION - DESCRIPTORS: DESCRIPTORS: ACHING;THROBBING;STABBING

## 2020-05-19 ASSESSMENT — PAIN DESCRIPTION - PAIN TYPE: TYPE: ACUTE PAIN

## 2020-05-19 ASSESSMENT — PAIN DESCRIPTION - ORIENTATION: ORIENTATION: LEFT

## 2020-05-19 NOTE — PROGRESS NOTES
Mass of the left sternoclavicular region.       TECHNIQUE: Multiplanar multisequence MRI of the chest (left sternoclavicular joint) was performed without with contrast.       COMPARISON: Portable chest radiograph from May 14, 2020       FINDINGS:       There are two septated fluid collections within the left pectoralis muscle anterior and inferior to the clavicle. The more medial of these fluid collections is centered approximately 2 cm from the sternoclavicular joint and measures approximately 0.8 cm    in AP dimension by 1.5 cm in transverse dimension by 1.0 cm in craniocaudal dimension. The more lateral of these two fluid collections is centered approximately 4 cm from the sternoclavicular joint and measures 0.8 cm in AP dimension by 2.8 cm in    transverse dimension by 1.2 cm in craniocaudal dimension. These fluid collections demonstrate adjacent soft tissue edema and postcontrast enhancement.       There are mild degenerative changes of the left sternoclavicular joint. No acute fracture. No hyperintense T2/hypointense T1 signal of the visualized left clavicle or sternum to suggest osteomyelitis at this time.           Impression       Two complex fluid collections with adjacent edema are present within the left pectoralis muscle measuring up to 2 cm and 1.5 cm respectively and most likely related to small abscesses or hematomas.       No evidence of osteomyelitis at this time.            ASSESSMENT:  Patient Active Problem List   Diagnosis    Cellulitis    Frequent falls    Weakness    Laryngeal carcinoma (HCC)    Gastroesophageal reflux         PLAN:    Septic arthritis of sternoclavicular joint   No osteomyelitis seen on MRI  MRI shows 2 abscesses close to the joint  Believe this represents sternoclavicular joint infection septic arthritis  group C streptococcus from blood culture    Examination of the patient today reveals area of the sternoclavicular joint become more prominent as the focus of infection    Will need an extended course of IV antibiotic therapy may need surgical debridement     Ancef for 6-week

## 2020-05-19 NOTE — CARE COORDINATION
LSW spoke with the pt and explained the transfer to [facility] today and that he would come back as an out patient for a line when ready. Pt is in agreement to this plan. [facility] is ready for the pt to admit to them.

## 2020-05-19 NOTE — PROGRESS NOTES
Pt brought to unit via wheelchair with [facility]. Oriented to room and call lighted explained. Pt came with mariano and 20g in R wrist. Pt has trach and respiratory notified. Pt A&Ox4. Pt also requested regular water and not nectar thick. Pt also stated that he was drinking thin liquids at [facility]. Notified Nursing Supervisor and gave small cup of water for pt to drink. Pt took a small sip and did good. Took pills in thin liquids.

## 2020-05-19 NOTE — PROGRESS NOTES
Physical Therapy Med Surg Daily Treatment Note  Facility/Department: [facility]  Room: [facility]/T284-48       NAME: Kaitlin Tiwari  : 1960 (60 y.o.)  MRN: [id number]  CODE STATUS: Full Code    Date of Service: 2020    Patient Diagnosis(es): Cellulitis [L03.90]  Cellulitis [L03.90]   Chief Complaint   Patient presents with    Extremity Weakness     BLE     Patient Active Problem List    Diagnosis Date Noted    Cellulitis 2020    Frequent falls 2020    Weakness 2020    Laryngeal carcinoma ([facility] [address] 2020    Gastroesophageal reflux 2020        Past Medical History:   Diagnosis Date    Hepatitis B     Hepatitis C     Hypothyroid     Other specified disorders of brain     diagnosed with Wet Brain in      History reviewed. No pertinent surgical history. Restrictions  Restrictions/Precautions: Fall Risk(Mildly Think ([place]), tracheostomy)    SUBJECTIVE   General  Chart Reviewed: Yes  Family / Caregiver Present: No  Subjective  Subjective: Pt is non-verbal and nods head in agreement to tx. Pt uses text on hs phone to communicate pain in L arm and leg and throat    Pre-Session Pain Report  Pre Treatment Pain Screening  Pain at present: 6  Scale Used: Numeric Score  Intervention List: Patient able to continue with treatment;Patient declined any intervention  Comments / Details: per nsg notes, pt recently medicated  Pain Screening  Patient Currently in Pain: Yes     Post-Session Pain Report  Pain Assessment  Pain Assessment: 0-10  Pain Level: 6  Pain Type: Acute pain  Pain Location: Throat;Arm;Leg  Pain Orientation: Left  Pain Descriptors: Aching; Throbbing;Stabbing     OBJECTIVE   Bed mobility  Rolling to Right: Contact guard assistance  Supine to Sit: Contact guard assistance  Comment: HOB slightly elevated per orders; VC's for sequencing and improved technique w/ slight hand over hand guidance    Transfers  Sit to Stand: Contact guard assistance  Stand to sit: Contact guard assistance  Bed to Chair: Minimal assistance(w/ use of [employer])  Comment: inc time and effort, pt demo's safe hand placement w/o cues today    Ambulation  Ambulation?: Yes  Ambulation 1  Surface: level tile  Device: Rolling Walker  Quality of Gait: flexed posture, slow denise, small steps w/ dec foot clearance and occ dragging L foot, increased fatigue. Gait Deviations: Slow Denise; Increased ROSEMARIE; Decreased step length;Decreased step height;Decreased head and trunk rotation  Distance: 3' to chair. Exercises  Comments: Pt requesting ex's for LE's while in chair. Administered seated and supine HEP and reviewed w/ pt. Pt verbalized understanding. (ankle pumps, LAQ, march, hip add, hip abd)      ASSESSMENT   Body structures, Functions, Activity limitations: Decreased functional mobility ; Decreased strength;Decreased posture;Decreased balance;Decreased high-level IADLs;Decreased ADL status  Assessment: Pt w/o knee buckling this date. Pt w/ heavily flexed fwd posture in standing requiring TC'ing w/ slight improvement. Manual assist given to also bring [employer] closer to pt for safety. Prognosis: Good  REQUIRES PT FOLLOW UP: Yes     Discharge Recommendations:  Continue to assess pending progress    Goals  Long term goals  Long term goal 1: bed mobility with indep   Long term goal 2: functional transfers with indep   Long term goal 3: amb 50ft with LRAD and indep   Long term goal 4: 1 curb step with AD and SBA to enter/exit home  Long term goal 5: indep with HEP to improve LE strength,   Patient Goals   Patient goals : \"get stronger\"    PLAN    Times per week: 3-6  Plan Comment: Cont. POC  Safety Devices  Type of devices:  All fall risk precautions in place, Call light within reach, Chair alarm in place     [facility] (6 CLICK) Joyce Parikh 28 Inpatient Mobility Raw Score : 16     Therapy Time   Individual   Time In 0951   Time Out 1015   Minutes 24     Gait: 5  Therex: 9  Transfers: [address], [facility], 05/19/20

## 2020-05-19 NOTE — DISCHARGE SUMMARY
medications which have NOT CHANGED    Details   levothyroxine (SYNTHROID) 75 MCG tablet Take 75 mcg by mouth Daily      senna-docusate (PERICOLACE) 8.6-50 MG per tablet Take 1 tablet by mouth daily      oxyCODONE-acetaminophen (PERCOCET) 5-325 MG per tablet Take 2 tablets by mouth every 4 hours as needed for Pain. STOP taking these medications       oxyCODONE-acetaminophen (ROXICET) 5-325 MG/5ML solution Comments:   Reason for Stopping:             Activity: activity as tolerated  Diet: nectar thick general diet   Wound Care: as directed    Follow-up with PCP in 2 week.     DC time 35 minutes    Signed:  Electronically signed by Prasanna Guerrero DO on 5/19/2020 at 2:22 PM

## 2020-05-20 PROBLEM — M00.9 SEPTIC ARTHRITIS (HCC): Status: ACTIVE | Noted: 2020-05-20

## 2020-05-20 PROBLEM — M54.2 NECK PAIN ON LEFT SIDE: Status: ACTIVE | Noted: 2020-05-20

## 2020-05-20 PROBLEM — M79.652 THIGH PAIN, MUSCULOSKELETAL, LEFT: Status: ACTIVE | Noted: 2020-05-20

## 2020-05-20 PROBLEM — M79.2: Status: ACTIVE | Noted: 2020-05-20

## 2020-05-20 PROBLEM — R07.89 CHEST WALL PAIN: Status: ACTIVE | Noted: 2020-05-20

## 2020-05-20 LAB
ANION GAP SERPL CALCULATED.3IONS-SCNC: 13 MEQ/L (ref 9–15)
BUN BLDV-MCNC: 6 MG/DL (ref 8–23)
CALCIUM SERPL-MCNC: 9 MG/DL (ref 8.5–9.9)
CHLORIDE BLD-SCNC: 99 MEQ/L (ref 95–107)
CO2: 28 MEQ/L (ref 20–31)
CREAT SERPL-MCNC: 0.43 MG/DL (ref 0.7–1.2)
GFR AFRICAN AMERICAN: >60
GFR NON-AFRICAN AMERICAN: >60
GLUCOSE BLD-MCNC: 98 MG/DL (ref 70–99)
POTASSIUM REFLEX MAGNESIUM: 3.6 MEQ/L (ref 3.4–4.9)
SODIUM BLD-SCNC: 140 MEQ/L (ref 135–144)

## 2020-05-20 PROCEDURE — 1200000002 HC SEMI PRIVATE SWING BED

## 2020-05-20 PROCEDURE — 6360000002 HC RX W HCPCS: Performed by: INTERNAL MEDICINE

## 2020-05-20 PROCEDURE — 80048 BASIC METABOLIC PNL TOTAL CA: CPT

## 2020-05-20 PROCEDURE — 6370000000 HC RX 637 (ALT 250 FOR IP): Performed by: INTERNAL MEDICINE

## 2020-05-20 PROCEDURE — 36415 COLL VENOUS BLD VENIPUNCTURE: CPT

## 2020-05-20 PROCEDURE — 2580000003 HC RX 258

## 2020-05-20 PROCEDURE — 97535 SELF CARE MNGMENT TRAINING: CPT

## 2020-05-20 PROCEDURE — 97530 THERAPEUTIC ACTIVITIES: CPT

## 2020-05-20 PROCEDURE — 97166 OT EVAL MOD COMPLEX 45 MIN: CPT

## 2020-05-20 PROCEDURE — 6370000000 HC RX 637 (ALT 250 FOR IP): Performed by: ANESTHESIOLOGY

## 2020-05-20 PROCEDURE — 97162 PT EVAL MOD COMPLEX 30 MIN: CPT

## 2020-05-20 PROCEDURE — 97116 GAIT TRAINING THERAPY: CPT

## 2020-05-20 RX ORDER — FENTANYL 12 UG/H
1 PATCH TRANSDERMAL
Status: DISCONTINUED | OUTPATIENT
Start: 2020-05-20 | End: 2020-06-15 | Stop reason: HOSPADM

## 2020-05-20 RX ORDER — MAGNESIUM HYDROXIDE 1200 MG/15ML
LIQUID ORAL
Status: COMPLETED
Start: 2020-05-20 | End: 2020-05-20

## 2020-05-20 RX ORDER — SENNA AND DOCUSATE SODIUM 50; 8.6 MG/1; MG/1
2 TABLET, FILM COATED ORAL DAILY
Status: DISCONTINUED | OUTPATIENT
Start: 2020-05-21 | End: 2020-06-02

## 2020-05-20 RX ORDER — POLYETHYLENE GLYCOL 3350 17 G/17G
17 POWDER, FOR SOLUTION ORAL DAILY
Status: DISCONTINUED | OUTPATIENT
Start: 2020-05-20 | End: 2020-06-15 | Stop reason: HOSPADM

## 2020-05-20 RX ORDER — MELOXICAM 7.5 MG/1
7.5 TABLET ORAL DAILY
Status: DISCONTINUED | OUTPATIENT
Start: 2020-05-20 | End: 2020-06-15 | Stop reason: HOSPADM

## 2020-05-20 RX ORDER — GABAPENTIN 100 MG/1
200 CAPSULE ORAL 3 TIMES DAILY
Status: DISCONTINUED | OUTPATIENT
Start: 2020-05-20 | End: 2020-06-07

## 2020-05-20 RX ADMIN — GABAPENTIN 100 MG: 100 CAPSULE ORAL at 13:25

## 2020-05-20 RX ADMIN — GABAPENTIN 100 MG: 100 CAPSULE ORAL at 08:57

## 2020-05-20 RX ADMIN — ACETAMINOPHEN 650 MG: 325 TABLET ORAL at 17:15

## 2020-05-20 RX ADMIN — CEFAZOLIN SODIUM 2 G: 2 SOLUTION INTRAVENOUS at 04:50

## 2020-05-20 RX ADMIN — ACETAMINOPHEN 650 MG: 325 TABLET ORAL at 08:57

## 2020-05-20 RX ADMIN — MAGNESIUM HYDROXIDE 30 ML: 400 SUSPENSION ORAL at 09:35

## 2020-05-20 RX ADMIN — ACETAMINOPHEN 650 MG: 325 TABLET ORAL at 13:25

## 2020-05-20 RX ADMIN — OXYCODONE HYDROCHLORIDE 10 MG: 5 TABLET ORAL at 21:07

## 2020-05-20 RX ADMIN — CEFAZOLIN SODIUM 2 G: 2 SOLUTION INTRAVENOUS at 19:55

## 2020-05-20 RX ADMIN — ENOXAPARIN SODIUM 40 MG: 40 INJECTION SUBCUTANEOUS at 08:57

## 2020-05-20 RX ADMIN — DOCUSATE SODIUM AND SENNOSIDES 1 TABLET: 50; 8.6 TABLET, FILM COATED ORAL at 08:57

## 2020-05-20 RX ADMIN — OXYCODONE HYDROCHLORIDE 10 MG: 5 TABLET ORAL at 04:47

## 2020-05-20 RX ADMIN — CEFAZOLIN SODIUM 2 G: 2 SOLUTION INTRAVENOUS at 13:26

## 2020-05-20 RX ADMIN — OXYCODONE HYDROCHLORIDE 10 MG: 5 TABLET ORAL at 00:51

## 2020-05-20 RX ADMIN — GABAPENTIN 200 MG: 100 CAPSULE ORAL at 19:55

## 2020-05-20 RX ADMIN — POLYETHYLENE GLYCOL 3350 17 G: 17 POWDER, FOR SOLUTION ORAL at 08:59

## 2020-05-20 RX ADMIN — FAMOTIDINE 20 MG: 20 TABLET ORAL at 19:56

## 2020-05-20 RX ADMIN — OXYCODONE HYDROCHLORIDE 10 MG: 5 TABLET ORAL at 13:25

## 2020-05-20 RX ADMIN — MELOXICAM 7.5 MG: 7.5 TABLET ORAL at 19:56

## 2020-05-20 RX ADMIN — OXYCODONE HYDROCHLORIDE 10 MG: 5 TABLET ORAL at 17:15

## 2020-05-20 RX ADMIN — LEVOTHYROXINE SODIUM 75 MCG: 75 TABLET ORAL at 05:27

## 2020-05-20 RX ADMIN — OXYCODONE HYDROCHLORIDE 10 MG: 5 TABLET ORAL at 08:57

## 2020-05-20 RX ADMIN — FAMOTIDINE 20 MG: 20 TABLET ORAL at 08:57

## 2020-05-20 RX ADMIN — WATER: 100 IRRIGANT IRRIGATION at 15:04

## 2020-05-20 RX ADMIN — ACETAMINOPHEN 650 MG: 325 TABLET ORAL at 19:55

## 2020-05-20 ASSESSMENT — PAIN DESCRIPTION - FREQUENCY: FREQUENCY: CONTINUOUS

## 2020-05-20 ASSESSMENT — PAIN SCALES - GENERAL
PAINLEVEL_OUTOF10: 9
PAINLEVEL_OUTOF10: 7
PAINLEVEL_OUTOF10: 8
PAINLEVEL_OUTOF10: 7
PAINLEVEL_OUTOF10: 8
PAINLEVEL_OUTOF10: 7
PAINLEVEL_OUTOF10: 5

## 2020-05-20 ASSESSMENT — ENCOUNTER SYMPTOMS
SORE THROAT: 1
TROUBLE SWALLOWING: 1
BACK PAIN: 1
SINUS PRESSURE: 1
FACIAL SWELLING: 1
RESPIRATORY NEGATIVE: 1
ALLERGIC/IMMUNOLOGIC NEGATIVE: 1
RHINORRHEA: 0
GASTROINTESTINAL NEGATIVE: 1
COLOR CHANGE: 1
VOICE CHANGE: 1
SINUS PAIN: 1
EYES NEGATIVE: 1

## 2020-05-20 ASSESSMENT — PAIN DESCRIPTION - LOCATION: LOCATION: KNEE;SHOULDER

## 2020-05-20 ASSESSMENT — PAIN DESCRIPTION - PAIN TYPE
TYPE: ACUTE PAIN
TYPE: ACUTE PAIN

## 2020-05-20 ASSESSMENT — PAIN DESCRIPTION - ORIENTATION: ORIENTATION: LEFT

## 2020-05-20 NOTE — PLAN OF CARE
Nutrition Problem: Moderate malnutrition, In context of chronic illness  Intervention: Food and/or Nutrient Delivery: Modify current diet, Modify current ONS  Nutritional Goals: Intake > 75% of meals & ONS. Maintain weight >/= current weight.

## 2020-05-20 NOTE — CONSULTS
INITIAL CONSULT -PAIN MANAGEMENT   [facility] skilled inpatient facility    SERVICE DATE:  5/20/2020   SERVICE TIME:  6:43 PM  Admission date 5/20/2020   REASON FORCONSULT: Chest wall pain, neck pain, right sternal pain  REQUESTING PHYSICIAN: Mushtaq Mas MD  PRIMARY CARE PHYSICIAN:No primary care provider on file. Chief complaint: Neck pain, right sternal pain, chest wall pain, right thigh and right leg pain    HISTORY OF PRESENTILLNESS:  Mr. Lucie Hermosillo is a 60 y.o. male who presents for [facility] several days ago for combination of symptoms including generalized weakness, failure to thrive, patient found having increasing redness and diagnosed by cellulitis of the chest wall due to infection, first diagnosed by septic arthritis of the sternoclavicular joint. Patient has infection through the trach, seen by ENT, I had followed him during his hospitalization at [facility].    He had lung problems with progressive weakness, also had cancer in the larynx that supposed to be treated, he was not opioid medicine on admission. However several months ago patient was on oxycodone and fentanyl patch and switch to Suboxone to get of those medication. Patient stated that he did not need them at that time and he had no pain. As he was at home he seemed that he had some nutritional deficiency because of inability to eat due to the infection through the trach. He is currently on antibiotic treatment admitted here for several reasons including physical therapy rehabilitation antibiotic treatment. Patient continues having a lot of pain including in the chest wall, neck also been demanding to have pain medication regularly. PAIN  ASSESSMENT:    constant    aching, sharp, shooting, stabbing and throbbing    pain is perceived as severe (6-8 pain scale), pain is excruciating , incapacitating and unbearable (9-10 pain scale), pain increases with any neck movement.   He has also positive pain on the right side every 8 hours for 40 doses  levothyroxine (SYNTHROID) 75 MCG tablet, Take 75 mcg by mouth Daily  senna-docusate (PERICOLACE) 8.6-50 MG per tablet, Take 1 tablet by mouth daily  oxyCODONE-acetaminophen (PERCOCET) 5-325 MG per tablet, Take 2 tablets by mouth every 4 hours as needed for Pain. [unfilled]    ALLERGIES:  Patient has no known allergies. COMPLETE REVIEW OF SYSTEMS:  As noted in HPI, 12 point ROS reviewed and otherwise negative. Review of Systems   Constitutional: Positive for activity change, appetite change and fatigue. Negative for chills, diaphoresis, fever and unexpected weight change. HENT: Positive for congestion, dental problem, ear pain, facial swelling, mouth sores, sinus pressure, sinus pain, sore throat, trouble swallowing and voice change. Negative for drooling, ear discharge, hearing loss, nosebleeds, postnasal drip, rhinorrhea, sneezing and tinnitus. Eyes: Negative. Respiratory: Negative. Cardiovascular: Negative. Gastrointestinal: Negative. Endocrine: Negative. Genitourinary: Negative. Musculoskeletal: Positive for arthralgias, back pain, gait problem, joint swelling, myalgias, neck pain and neck stiffness. Skin: Positive for color change and pallor. Negative for rash and wound. Allergic/Immunologic: Negative. Neurological: Positive for weakness and numbness. Negative for dizziness, tremors, seizures, syncope, facial asymmetry, speech difficulty, light-headedness and headaches. Hematological: Negative. Psychiatric/Behavioral: Positive for sleep disturbance. Negative for agitation, behavioral problems, confusion, decreased concentration, dysphoric mood, hallucinations, self-injury and suicidal ideas. The patient is nervous/anxious. The patient is not hyperactive.           OBJECTIVE  PHYSICAL EXAM:  BP (!) 147/72   Pulse 69   Temp 98.8 °F (37.1 °C) (Oral)   Resp 18   Ht 5' 5\" (1.651 m)   Wt 140 lb 3.2 oz (63.6 kg)   SpO2 94%   BMI 23.33 kg/m²   Body these two fluid collections is centered approximately 4 cm from the sternoclavicular joint and measures 0.8 cm in AP dimension by 2.8 cm in transverse dimension by 1.2 cm in craniocaudal dimension. These fluid collections demonstrate adjacent soft tissue edema and postcontrast enhancement. There are mild degenerative changes of the left sternoclavicular joint. No acute fracture. No hyperintense T2/hypointense T1 signal of the visualized left clavicle or sternum to suggest osteomyelitis at this time. Two complex fluid collections with adjacent edema are present within the left pectoralis muscle measuring up to 2 cm and 1.5 cm respectively and most likely related to small abscesses or hematomas. No evidence of osteomyelitis at this time. Xr Chest Portable    Result Date: 5/14/2020  EXAMINATION: XR CHEST PORTABLE CLINICAL HISTORY: FATIGUE COMPARISONS: None available. FINDINGS: Surgical clips left neck. Osseous structures intact. Cardiopericardial silhouette normal. Lungs clear. NO ACUTE CARDIOPULMONARY DISEASE. Ir Picc Wo Sq Port/pump > 5 Years    Result Date: 5/19/2020  NO IMAGES NO DICTATION        ASSESSMENT & PLAN      Active Hospital Problems    Diagnosis Date Noted    Chest wall pain [R07.89] 05/20/2020    Neck pain on left side [M54.2] 05/20/2020    Septic arthritis (HCC)/ Sternoclavicular joint  [M00.9] 05/20/2020    Thigh pain, musculoskeletal, left [M79.652] 05/20/2020    Neuropathic pain of thigh, left [M79.2] 05/20/2020    Laryngeal carcinoma ([address] [C32.9] 05/14/2020    Cellulitis [L03.90] 05/14/2020    Frequent falls [R29.6] 05/14/2020    Weakness [R53.1] 05/14/2020    Gastroesophageal reflux [K21.9] 05/14/2020       60years old male history of head and neck cancer, patient supposed to be in remission, he had trach in place however he has been having lots of nutritional deficiency, infection of the trach, led to septic arthritis of the sternoclavicular joint.     Patient is being admitted for long-term antibiotic therapy, he was seen over [facility] on May 17, started on medical treatment with multimodal analgesic Tylenol and lidocaine cream and low-dose membrane stabilizer in addition also patient was started on oxycodone for breakthrough pain. Has been receiving antibiotic therapy is reporting a lot of pain into the sternal area in addition also has acute on top of chronic pain on the left thigh area. 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 I do believe that starting Duragesic patch at 12 mcg would be an appropriate since his been consuming a lot of oxycodone. We will be increasing Neurontin to 200 mg 3 times daily    I will be also Continuing of the topical analgesic lidocaine as tolerated    Continue oxycodone for breakthrough pain as tolerated for breakthrough pain and also prior to movement    I do believe that there was a plan either palliative care or hospice however not sure if this was accomplished currently he is receiving antibiotic treatment for his septic arthritis sternoclavicular joint, encourage therapy for the left thigh as well.     SIGNATURE: Shayna Prado MD PATIENT NAME: Flaco Norton   DATE: May 20, 2020 MRN: [id number]   TIME: 6:43 PM PAGER/CONTACT #: [phone]

## 2020-05-20 NOTE — PROGRESS NOTES
mobility  Supine to Sit: Supervision  Sit to Supine: Supervision  Scooting: Contact guard assistance  Comment: increased time and effort needed for bed mobility                 Sensation  Overall Sensation Status: Impaired(pt. reports paresthesia in BUE and hands)        LUE PROM (degrees)  LUE PROM: Exceptions  L Shoulder Flex  0-180: 0-85  LUE AROM (degrees)  LUE AROM : Exceptions  L Shoulder Flexion 0-180: 0-5  Left Hand AROM (degrees)  Left Hand AROM: WFL  RUE AROM (degrees)  RUE AROM : WFL  Right Hand AROM (degrees)  Right Hand AROM: WFL  LUE Strength  Gross LUE Strength: Exceptions to [facility]  L Shoulder Flex: 2+/5  L Elbow Flex: 3+/5  RUE Strength  Gross RUE Strength: WFL     Hand Dominance  Hand Dominance: Right             Plan   Plan  Times per week: 3-6 xs/week  Times per day: Daily  Specific instructions for Next Treatment: full ADL  Current Treatment Recommendations: Strengthening, ROM, Balance Training, Functional Mobility Training, Endurance Training, Pain Management, Safety Education & Training, Patient/Caregiver Education & Training, Equipment Evaluation, Education, & procurement, Positioning, Home Management Training, Self-Care / ADL  Plan Comment: Pt. requires skilled OT intervention services needed to increase balance, strength, and endurance needed to increase safety and independence with ADLs, transfers, and functional mobility. G-Code     OutComes Score                                                  AM-PAC Score             Goals  Short term goals  Time Frame for Short term goals: 10 days to 3 weeks or length of IV  Short term goal 1: same as LTG  Short term goal 2: same as LTG  Short term goal 3: same as LTG  Short term goal 4: same as LTG  Short term goal 5: same as LTG  Long term goals  Time Frame for Long term goals : 10 days to 3 weeks or length of IV  Long term goal 1: Pt. will complete UB/LB bathing at mod I level.   Long term goal 2: Pt. will complete UB/LB dressing at mod I

## 2020-05-20 NOTE — H&P
Hospital Medicine History & Physical      PCP: No primary care provider on file. Date of Admission: 5/19/2020    Date of Service: 5/20/20      Chief Complaint:  pain      History Of Present Illness:  60 y.o. male who presented to [facility] after multiple falls at home, weakness, was found to have bacteremia/sepsis due to L sternoclavicular joint septic arthritis. Was evaluated by surgery/ID, tracheostomy was replaced, atbs initiated. After completing his acute treatment was transferred to [facility] for prolong course of IV atbs. Denied fever, dyspnea, mentioned about constipation      Past Medical History:          Diagnosis Date    Hepatitis B     Hepatitis C     Hypothyroid     Other specified disorders of brain     diagnosed with Wet Brain in 2006       Past Surgical History:      No past surgical history on file. Medications Prior to Admission:      Prior to Admission medications    Medication Sig Start Date End Date Taking? Authorizing Provider   ceFAZolin (ANCEF) 1 g injection Inject 2,000 mg into the muscle every 8 hours for 40 doses 5/18/20 6/1/20 Yes Monika Garland MD   levothyroxine (SYNTHROID) 75 MCG tablet Take 75 mcg by mouth Daily   Yes Historical Provider, MD   senna-docusate (Baby Josephine) 8.6-50 MG per tablet Take 1 tablet by mouth daily   Yes Historical Provider, MD   oxyCODONE-acetaminophen (PERCOCET) 5-325 MG per tablet Take 2 tablets by mouth every 4 hours as needed for Pain. Yes Historical Provider, MD       Allergies:  Patient has no known allergies. Social History:      The patient currently lives home    TOBACCO:   reports that he has quit smoking. He has quit using smokeless tobacco.  ETOH:   reports previous alcohol use. Family History:       Reviewed in detail and negative for DM, CAD, Cancer, CVA. No family history on file. REVIEW OF SYSTEMS:   Pertinent positives as noted in the HPI. All other systems reviewed and negative.     PHYSICAL EXAM:    BP (!)

## 2020-05-20 NOTE — PROGRESS NOTES
Physical Therapy    Facility/Department: Subacute  Initial Assessment    NAME: Thelma Emmanuel  : 1960  MRN: [id number]    Date of Service: 2020    Patient Diagnosis(es): There were no encounter diagnoses. has a past medical history of Hepatitis B, Hepatitis C, Hypothyroid, and Other specified disorders of brain. has no past surgical history on file. Restrictions  Restrictions/Precautions  Restrictions/Precautions: (P) Fall Risk  Required Braces or Orthoses?: (P) No  Vision/Hearing        Subjective  General  Chart Reviewed: Yes  Patient assessed for rehabilitation services?: (P) Yes  Additional Pertinent Hx: Trach for at least 5 years, now iwth cellulitis of chest wall/pain and  need for antibiotics for 6 weeks  Referring Practitioner: Dr Brigette Mclean  Referral Date : 20  Diagnosis: weakness due to cellulitis chest wall   Subjective  Subjective: Pt nonverbal due to hisotry of CA, capped trach, communicates texting with pohne or nods/shakes head, finger/ hand gestures.   Pain Screening  Patient Currently in Pain: (P) Yes  Pain Assessment  Pain Assessment: 0-10  Pain Level: 8  Patient's Stated Pain Goal: 3(\"even 5/10 would be better\")  Pain Type: Acute pain  Pain Location: Throat;Arm;Leg;Chest  Pain Radiating Towards: left chest and upper arm from infection, L knee from scarring from old surgery to try and help other medical issues  Pain Frequency: Continuous  Non-Pharmaceutical Pain Intervention(s): (heat helps sometimes)  Vital Signs  Patient Currently in Pain: Yes  Pre Treatment Pain Screening  Pain at present: 8  Scale Used: Numeric Score  Intervention List: Patient able to continue with treatment    Orientation       Social/Functional History  Social/Functional History  Lives With: (step son and step son's wife)  Type of Home: House  Home Layout: One level  Home Access: Stairs to enter without rails  Entrance Stairs - Number of Steps: 1  Bathroom Shower/Tub: Tub/Shower unit  Bathroom Equipment: Dynamic: Fair;+  Standing - Static: Fair;-(ww)  Standing - Dynamic: Fair(ww)        Assessment   Body structures, Functions, Activity limitations: Decreased functional mobility ; Decreased strength;Decreased posture;Decreased balance;Decreased safe awareness  Treatment Diagnosis: weakness with decreased fucn tinoal mobiltiy independence due to cellulits chest wall  Prognosis: Good  REQUIRES PT FOLLOW UP: Yes  Activity Tolerance  Activity Tolerance: Patient Tolerated treatment well;Patient limited by pain     Discharge Recommendations:  Continue to assess pending progress, Home with assist PRN, Home with Home health PT      Plan   Plan  Times per week: 5-7x week  Times per day: (1-2 x per day)  Plan weeks: 6 weeks or legnth of IVs   Current Treatment Recommendations: Strengthening, Functional Mobility Training, Transfer Training, Balance Training, Endurance Training, Stair training, Gait Training, Pain Management, Patient/Caregiver Education & Training, Safety Education & Training, Manual Therapy - Joint Manipulation, Home Exercise Program, Equipment Evaluation, Education, & procurement, Positioning  Safety Devices  Type of devices:  All fall risk precautions in place, Call light within reach, Left in bed    G-Code          Goals  Short term goals  Time Frame for Short term goals: 1-2 weeks  Short term goal 1: transfer sit to stand with SBA and <1-2 vc for safety  Short term goal 2: bed mobility with <= SBA and increased time  Short term goal 3: amb >= 50ft before fatigued with CGA and ww  Short term goal 4: assess curb step wtih ww and Min A of 1  Long term goals  Time Frame for Long term goals : 6 weeks or length of IV needs   Long term goal 1: Modified independent bed mobility  Long term goal 2: Modified independent transfers with least AD  Long term goal 3: amb >=100ft with least AD modified independent  Long term goal 4: 1 curb step with AD and SBA to enter/exit home  Long term goal 5: indep with HEP to improve LE

## 2020-05-21 PROCEDURE — 97530 THERAPEUTIC ACTIVITIES: CPT

## 2020-05-21 PROCEDURE — 2580000003 HC RX 258: Performed by: INTERNAL MEDICINE

## 2020-05-21 PROCEDURE — 6360000002 HC RX W HCPCS: Performed by: INTERNAL MEDICINE

## 2020-05-21 PROCEDURE — 6370000000 HC RX 637 (ALT 250 FOR IP): Performed by: INTERNAL MEDICINE

## 2020-05-21 PROCEDURE — 92610 EVALUATE SWALLOWING FUNCTION: CPT

## 2020-05-21 PROCEDURE — 1200000002 HC SEMI PRIVATE SWING BED

## 2020-05-21 PROCEDURE — 97116 GAIT TRAINING THERAPY: CPT

## 2020-05-21 PROCEDURE — 99222 1ST HOSP IP/OBS MODERATE 55: CPT | Performed by: INTERNAL MEDICINE

## 2020-05-21 PROCEDURE — 6370000000 HC RX 637 (ALT 250 FOR IP): Performed by: ANESTHESIOLOGY

## 2020-05-21 PROCEDURE — 02HV33Z INSERTION OF INFUSION DEVICE INTO SUPERIOR VENA CAVA, PERCUTANEOUS APPROACH: ICD-10-PCS | Performed by: INTERNAL MEDICINE

## 2020-05-21 RX ORDER — SENNA PLUS 8.6 MG/1
4 TABLET ORAL NIGHTLY
Status: DISCONTINUED | OUTPATIENT
Start: 2020-05-21 | End: 2020-05-23

## 2020-05-21 RX ORDER — SODIUM CHLORIDE 9 MG/ML
250 INJECTION, SOLUTION INTRAVENOUS ONCE
Status: COMPLETED | OUTPATIENT
Start: 2020-05-21 | End: 2020-05-21

## 2020-05-21 RX ORDER — SODIUM CHLORIDE 0.9 % (FLUSH) 0.9 %
10 SYRINGE (ML) INJECTION PRN
Status: DISCONTINUED | OUTPATIENT
Start: 2020-05-21 | End: 2020-06-15 | Stop reason: HOSPADM

## 2020-05-21 RX ORDER — SODIUM CHLORIDE 0.9 % (FLUSH) 0.9 %
10 SYRINGE (ML) INJECTION EVERY 12 HOURS SCHEDULED
Status: DISCONTINUED | OUTPATIENT
Start: 2020-05-21 | End: 2020-06-15 | Stop reason: HOSPADM

## 2020-05-21 RX ORDER — LIDOCAINE HYDROCHLORIDE 20 MG/ML
5 INJECTION, SOLUTION INFILTRATION; PERINEURAL ONCE
Status: DISCONTINUED | OUTPATIENT
Start: 2020-05-21 | End: 2020-05-29

## 2020-05-21 RX ADMIN — FAMOTIDINE 20 MG: 20 TABLET ORAL at 08:49

## 2020-05-21 RX ADMIN — SENNOSIDES 34.4 MG: 8.6 TABLET, FILM COATED ORAL at 21:17

## 2020-05-21 RX ADMIN — CEFAZOLIN SODIUM 2 G: 2 SOLUTION INTRAVENOUS at 05:05

## 2020-05-21 RX ADMIN — OXYCODONE HYDROCHLORIDE 10 MG: 5 TABLET ORAL at 05:05

## 2020-05-21 RX ADMIN — ACETAMINOPHEN 650 MG: 325 TABLET ORAL at 08:48

## 2020-05-21 RX ADMIN — ENOXAPARIN SODIUM 40 MG: 40 INJECTION SUBCUTANEOUS at 08:49

## 2020-05-21 RX ADMIN — OXYCODONE HYDROCHLORIDE 10 MG: 5 TABLET ORAL at 12:53

## 2020-05-21 RX ADMIN — SODIUM CHLORIDE 250 ML: 9 INJECTION, SOLUTION INTRAVENOUS at 17:18

## 2020-05-21 RX ADMIN — OXYCODONE HYDROCHLORIDE 10 MG: 5 TABLET ORAL at 01:17

## 2020-05-21 RX ADMIN — CEFAZOLIN SODIUM 2 G: 2 SOLUTION INTRAVENOUS at 12:48

## 2020-05-21 RX ADMIN — GABAPENTIN 200 MG: 100 CAPSULE ORAL at 12:48

## 2020-05-21 RX ADMIN — OXYCODONE HYDROCHLORIDE 10 MG: 5 TABLET ORAL at 08:46

## 2020-05-21 RX ADMIN — ACETAMINOPHEN 650 MG: 325 TABLET ORAL at 21:16

## 2020-05-21 RX ADMIN — ACETAMINOPHEN 650 MG: 325 TABLET ORAL at 12:48

## 2020-05-21 RX ADMIN — PIPERACILLIN AND TAZOBACTAM 3.38 G: 3; .375 INJECTION, POWDER, LYOPHILIZED, FOR SOLUTION INTRAVENOUS at 17:18

## 2020-05-21 RX ADMIN — Medication 10 ML: at 21:04

## 2020-05-21 RX ADMIN — POLYETHYLENE GLYCOL 3350 17 G: 17 POWDER, FOR SOLUTION ORAL at 08:49

## 2020-05-21 RX ADMIN — OXYCODONE HYDROCHLORIDE 10 MG: 5 TABLET ORAL at 17:17

## 2020-05-21 RX ADMIN — SENNOSIDES AND DOCUSATE SODIUM 2 TABLET: 8.6; 5 TABLET ORAL at 08:49

## 2020-05-21 RX ADMIN — GABAPENTIN 200 MG: 100 CAPSULE ORAL at 08:48

## 2020-05-21 RX ADMIN — LEVOTHYROXINE SODIUM 75 MCG: 75 TABLET ORAL at 05:05

## 2020-05-21 RX ADMIN — PIPERACILLIN AND TAZOBACTAM 3.38 G: 3; .375 INJECTION, POWDER, LYOPHILIZED, FOR SOLUTION INTRAVENOUS at 23:40

## 2020-05-21 RX ADMIN — ACETAMINOPHEN 650 MG: 325 TABLET ORAL at 17:18

## 2020-05-21 RX ADMIN — FAMOTIDINE 20 MG: 20 TABLET ORAL at 21:17

## 2020-05-21 RX ADMIN — OXYCODONE HYDROCHLORIDE 10 MG: 5 TABLET ORAL at 21:17

## 2020-05-21 RX ADMIN — GABAPENTIN 200 MG: 100 CAPSULE ORAL at 21:17

## 2020-05-21 RX ADMIN — MELOXICAM 7.5 MG: 7.5 TABLET ORAL at 08:48

## 2020-05-21 ASSESSMENT — PAIN DESCRIPTION - PAIN TYPE
TYPE: CHRONIC PAIN

## 2020-05-21 ASSESSMENT — PAIN DESCRIPTION - FREQUENCY: FREQUENCY: CONTINUOUS

## 2020-05-21 ASSESSMENT — PAIN DESCRIPTION - ORIENTATION
ORIENTATION: LEFT

## 2020-05-21 ASSESSMENT — PAIN SCALES - GENERAL
PAINLEVEL_OUTOF10: 8
PAINLEVEL_OUTOF10: 6
PAINLEVEL_OUTOF10: 8
PAINLEVEL_OUTOF10: 8
PAINLEVEL_OUTOF10: 5
PAINLEVEL_OUTOF10: 5
PAINLEVEL_OUTOF10: 7
PAINLEVEL_OUTOF10: 6
PAINLEVEL_OUTOF10: 8
PAINLEVEL_OUTOF10: 5
PAINLEVEL_OUTOF10: 8
PAINLEVEL_OUTOF10: 5

## 2020-05-21 ASSESSMENT — ENCOUNTER SYMPTOMS: COLOR CHANGE: 1

## 2020-05-21 ASSESSMENT — PAIN DESCRIPTION - LOCATION
LOCATION: LEG
LOCATION: LEG

## 2020-05-21 NOTE — PROGRESS NOTES
orders  Hyponatremia on admission at [facility]- resolved, follow up with BMP  Medically stable for skilled admission at [facility]        Electronically signed by Joseph Castellanos MD on 5/21/2020 at 8:37 AM

## 2020-05-21 NOTE — PLAN OF CARE
Problem: Falls - Risk of:  Goal: Will remain free from falls  Description: Will remain free from falls  5/21/2020 0850 by Pete Ring RN  Outcome: Met This Shift  5/21/2020 0145 by Evin Mcknight RN  Outcome: Ongoing  Goal: Absence of physical injury  Description: Absence of physical injury  5/21/2020 0850 by ePte Ring RN  Outcome: Met This Shift  5/21/2020 0145 by Evin Mcknight RN  Outcome: Ongoing     Problem: DAILY CARE  Goal: Daily care needs are met  5/21/2020 0850 by Pete Ring RN  Outcome: Met This Shift  5/21/2020 0145 by Evin Mcknight RN  Outcome: Ongoing     Problem: SKIN INTEGRITY  Goal: Skin integrity is maintained or improved  5/21/2020 0850 by Pete Ring RN  Outcome: Met This Shift  5/21/2020 0145 by Evin Mcknight RN  Outcome: Ongoing     Problem: Nutrition  Goal: Optimal nutrition therapy  5/21/2020 0850 by Pete Ring RN  Outcome: Met This Shift  5/21/2020 0145 by Evin Mcknight RN  Outcome: Ongoing     Problem: Pain:  Description: Pain management should include both nonpharmacologic and pharmacologic interventions.   Goal: Pain level will decrease  Description: Pain level will decrease  5/21/2020 0850 by Pete Ring RN  Outcome: Ongoing  5/21/2020 0145 by Evin Mcknight RN  Outcome: Ongoing  Goal: Control of acute pain  Description: Control of acute pain  5/21/2020 0850 by Pete Ring RN  Outcome: Ongoing  5/21/2020 0145 by Evin Mcknight RN  Outcome: Ongoing  Goal: Control of chronic pain  Description: Control of chronic pain  5/21/2020 0850 by Pete Ring RN  Outcome: Ongoing  5/21/2020 0145 by Evin Mcknight RN  Outcome: Ongoing

## 2020-05-21 NOTE — CONSULTS
trimethoprim-sulfamethoxazole Sensitive <=10           Imaging:   MRI chest  Impression       Two complex fluid collections with adjacent edema are present within the left pectoralis muscle measuring up to 2 cm and 1.5 cm respectively and most likely related to small abscesses or hematomas.       No evidence of osteomyelitis at this time.          IMPRESSION:    · L chest wall cellulitis with abscess and probable septic arthritis of L sternoclavicular joint  · H/O laryngeal cancer with Trach  · Strep bacteremia    Patient Active Problem List   Diagnosis    Cellulitis    Frequent falls    Weakness    Laryngeal carcinoma (HCC)    Gastroesophageal reflux    Chest wall pain    Neck pain on left side    Septic arthritis (HCC)/ Sternoclavicular joint     Thigh pain, musculoskeletal, left    Neuropathic pain of thigh, left       PLAN:  · Change Ancef to Zosyn for lack of improvement  · Consult Thoracic surgery, dr Kris Ann if not better  · F/U CBC BMP CRP    Discussed with patient and charge nurse Mathew Gaytan MD

## 2020-05-21 NOTE — PROGRESS NOTES
with least AD modified independent  Long term goal 4: 1 curb step with AD and SBA to enter/exit home  Long term goal 5: indep with HEP to improve LE strength,   Patient Goals   Patient goals : \" Get strogner to go home. \" via mouthing words    Plan    Plan  Times per week: 5-7x week  Times per day: (1-2 x per day)  Plan weeks: 6 weeks or legnth of IVs   Current Treatment Recommendations: Strengthening, Functional Mobility Training, Transfer Training, Balance Training, Endurance Training, Stair training, Gait Training, Pain Management, Patient/Caregiver Education & Training, Safety Education & Training, Manual Therapy - Joint Manipulation, Home Exercise Program, Equipment Evaluation, Education, & procurement, Positioning  Plan Comment: Cont. POC  Safety Devices  Type of devices:  All fall risk precautions in place, Call light within reach     Therapy Time   Individual Concurrent Group Co-treatment   Time In 10:30am        Time Out 11:05am        Minutes  35 min                  AMRIE Padron#[id number]

## 2020-05-21 NOTE — PROGRESS NOTES
Occupational Therapy  Facility/Department: [facility] MED SURG UNIT  Daily Treatment Note  NAME: Cat Banda  : 1960  MRN: [id number]    Date of Service: 2020    Discharge Recommendations:  Continue to assess pending progress       Assessment   Performance deficits / Impairments: Decreased functional mobility ; Decreased ADL status; Decreased ROM; Decreased strength;Decreased endurance;Decreased sensation;Decreased balance  Assessment: Pt CGA for sit <-> stands and fxl mob with RW. Pt agreeable to sit up in recliner chair for lunch. Provided pt with air cushion for when sitting up in chair for comfort and prevent sores. Pt standing jamilah 4min and 3min with RW, CGA, 0 LOB. Treatment Diagnosis: cellulitis  Prognosis: Good  REQUIRES OT FOLLOW UP: Yes  Activity Tolerance  Activity Tolerance: Patient limited by fatigue    Patient Diagnosis(es): There were no encounter diagnoses. has a past medical history of Hepatitis B, Hepatitis C, Hypothyroid, and Other specified disorders of brain. has no past surgical history on file.     Restrictions  Restrictions/Precautions  Restrictions/Precautions: Fall Risk  Required Braces or Orthoses?: No  Subjective   General  Chart Reviewed: Yes  Patient assessed for rehabilitation services?: Yes  Family / Caregiver Present: No  Referring Practitioner: Isaias Rodriguez MD  Subjective  Subjective: Pt supine in bed, agreeable to OT  Vital Signs  BP Location: Left upper arm  Level of Consciousness: Alert  Oxygen Therapy  O2 Device: None (Room air)  Patient Observation  Observations: pt. has IV in place on right wrist. and mariano catheter in place     Objective       Standing Balance  Time: 4min and 3min  Activity: dyn stand CGA with RW to increase stand jamilah/end for ADL(0 LOB)  Functional Mobility  Functional - Mobility Device: Rolling Walker  Activity: Other(2x level hallway surface)  Assist Level: Contact guard assistance  Functional Mobility Comments: pt with slow pace  Bed mobility  Supine to Sit: Supervision  Transfers  Sit to stand: Contact guard assistance  Stand to sit: Contact guard assistance           Plan   Plan  Times per week: 3-6 xs/week  Times per day: Daily  Specific instructions for Next Treatment: full ADL  Current Treatment Recommendations: Strengthening, ROM, Balance Training, Functional Mobility Training, Endurance Training, Pain Management, Safety Education & Training, Patient/Caregiver Education & Training, Equipment Evaluation, Education, & procurement, Positioning, Home Management Training, Self-Care / ADL  Plan Comment: Pt. requires skilled OT intervention services needed to increase balance, strength, and endurance needed to increase safety and independence with ADLs, transfers, and functional mobility. Goals  Short term goals  Time Frame for Short term goals: 10 days to 3 weeks or length of IV  Short term goal 1: same as LTG  Short term goal 2: same as LTG  Short term goal 3: same as LTG  Short term goal 4: same as LTG  Short term goal 5: same as LTG  Long term goals  Time Frame for Long term goals : 10 days to 3 weeks or length of IV  Long term goal 1: Pt. will complete UB/LB bathing at mod I level. Long term goal 2: Pt. will complete UB/LB dressing at mod I level. Long term goal 3: Pt. will complete functional transfers at mod I level. Long term goal 4: Pt. will demonstrate good dynamic standing balance during ADL completion with mod I.  Long term goal 5: Pt. will complete BUE strengthening/ROM exercises needed for transfers and ADLs.   Patient Goals   Patient goals : to get better       Therapy Time   Individual Concurrent Group Co-treatment   Time In  1:30         Time Out  2:05         Minutes  1 [place], [place]

## 2020-05-22 PROCEDURE — 97116 GAIT TRAINING THERAPY: CPT

## 2020-05-22 PROCEDURE — 6370000000 HC RX 637 (ALT 250 FOR IP): Performed by: INTERNAL MEDICINE

## 2020-05-22 PROCEDURE — 1200000002 HC SEMI PRIVATE SWING BED

## 2020-05-22 PROCEDURE — 2580000003 HC RX 258: Performed by: INTERNAL MEDICINE

## 2020-05-22 PROCEDURE — 6360000002 HC RX W HCPCS: Performed by: INTERNAL MEDICINE

## 2020-05-22 PROCEDURE — 97530 THERAPEUTIC ACTIVITIES: CPT

## 2020-05-22 PROCEDURE — 6370000000 HC RX 637 (ALT 250 FOR IP): Performed by: ANESTHESIOLOGY

## 2020-05-22 RX ADMIN — MAGNESIUM CITRATE 150 ML: 1.75 LIQUID ORAL at 12:28

## 2020-05-22 RX ADMIN — OXYCODONE HYDROCHLORIDE 10 MG: 5 TABLET ORAL at 22:25

## 2020-05-22 RX ADMIN — ACETAMINOPHEN 650 MG: 325 TABLET ORAL at 12:27

## 2020-05-22 RX ADMIN — GABAPENTIN 200 MG: 100 CAPSULE ORAL at 12:27

## 2020-05-22 RX ADMIN — ENOXAPARIN SODIUM 40 MG: 40 INJECTION SUBCUTANEOUS at 09:46

## 2020-05-22 RX ADMIN — GABAPENTIN 200 MG: 100 CAPSULE ORAL at 21:17

## 2020-05-22 RX ADMIN — GABAPENTIN 200 MG: 100 CAPSULE ORAL at 09:46

## 2020-05-22 RX ADMIN — OXYCODONE HYDROCHLORIDE 10 MG: 5 TABLET ORAL at 01:19

## 2020-05-22 RX ADMIN — MELOXICAM 7.5 MG: 7.5 TABLET ORAL at 09:47

## 2020-05-22 RX ADMIN — ACETAMINOPHEN 650 MG: 325 TABLET ORAL at 16:49

## 2020-05-22 RX ADMIN — OXYCODONE HYDROCHLORIDE 10 MG: 5 TABLET ORAL at 14:02

## 2020-05-22 RX ADMIN — FAMOTIDINE 20 MG: 20 TABLET ORAL at 21:17

## 2020-05-22 RX ADMIN — LEVOTHYROXINE SODIUM 75 MCG: 75 TABLET ORAL at 05:51

## 2020-05-22 RX ADMIN — OXYCODONE HYDROCHLORIDE 10 MG: 5 TABLET ORAL at 05:51

## 2020-05-22 RX ADMIN — ACETAMINOPHEN 650 MG: 325 TABLET ORAL at 21:17

## 2020-05-22 RX ADMIN — PIPERACILLIN AND TAZOBACTAM 3.38 G: 3; .375 INJECTION, POWDER, LYOPHILIZED, FOR SOLUTION INTRAVENOUS at 05:51

## 2020-05-22 RX ADMIN — FAMOTIDINE 20 MG: 20 TABLET ORAL at 09:47

## 2020-05-22 RX ADMIN — SENNOSIDES 34.4 MG: 8.6 TABLET, FILM COATED ORAL at 21:17

## 2020-05-22 RX ADMIN — PIPERACILLIN AND TAZOBACTAM 3.38 G: 3; .375 INJECTION, POWDER, LYOPHILIZED, FOR SOLUTION INTRAVENOUS at 16:50

## 2020-05-22 RX ADMIN — POLYETHYLENE GLYCOL 3350 17 G: 17 POWDER, FOR SOLUTION ORAL at 09:45

## 2020-05-22 RX ADMIN — Medication 10 ML: at 21:16

## 2020-05-22 RX ADMIN — OXYCODONE HYDROCHLORIDE 10 MG: 5 TABLET ORAL at 18:04

## 2020-05-22 RX ADMIN — OXYCODONE HYDROCHLORIDE 10 MG: 5 TABLET ORAL at 09:55

## 2020-05-22 RX ADMIN — ACETAMINOPHEN 650 MG: 325 TABLET ORAL at 09:46

## 2020-05-22 RX ADMIN — SENNOSIDES AND DOCUSATE SODIUM 2 TABLET: 8.6; 5 TABLET ORAL at 09:46

## 2020-05-22 ASSESSMENT — PAIN DESCRIPTION - ORIENTATION
ORIENTATION: LEFT
ORIENTATION: LEFT

## 2020-05-22 ASSESSMENT — PAIN SCALES - GENERAL
PAINLEVEL_OUTOF10: 8
PAINLEVEL_OUTOF10: 7
PAINLEVEL_OUTOF10: 0
PAINLEVEL_OUTOF10: 7
PAINLEVEL_OUTOF10: 3
PAINLEVEL_OUTOF10: 8
PAINLEVEL_OUTOF10: 8
PAINLEVEL_OUTOF10: 7
PAINLEVEL_OUTOF10: 7
PAINLEVEL_OUTOF10: 8

## 2020-05-22 ASSESSMENT — PAIN DESCRIPTION - LOCATION
LOCATION: SHOULDER
LOCATION: SHOULDER

## 2020-05-22 NOTE — PROGRESS NOTES
Occupational Therapy  Facility/Department: [facility] MED SURG UNIT  Daily Treatment Note  NAME: Elias Martin  : 1960  MRN: [id number]    Date of Service: 2020    Discharge Recommendations:  Continue to assess pending progress       Assessment   Performance deficits / Impairments: Decreased functional mobility ; Decreased ADL status; Decreased ROM; Decreased strength;Decreased endurance;Decreased sensation;Decreased balance  Assessment: Co-tx with PTA 2* pt with low endurance and IV pole, catheter, and w/c follow. Pt cont's CGA sit <-> stands and fxl mob with RW. Pt tolerated 7min 30sec stand this date, 0 LOB. Pt requested to get back into bed post tx. Treatment Diagnosis: cellulitis  Prognosis: Good  REQUIRES OT FOLLOW UP: Yes         Patient Diagnosis(es): There were no encounter diagnoses. has a past medical history of Hepatitis B, Hepatitis C, Hypothyroid, and Other specified disorders of brain. has no past surgical history on file. Restrictions  Restrictions/Precautions  Restrictions/Precautions: Fall Risk  Required Braces or Orthoses?: No  Subjective   General  Chart Reviewed: Yes  Patient assessed for rehabilitation services?: Yes  Family / Caregiver Present: No  Referring Practitioner: Joycelyn Salgado MD  Subjective  Subjective: Pt supine in bed, agreeable to OT. Pt stated numbness in L knee still and L shl pain 8/10.   Pain Assessment  Pain Assessment: 0-10  Pain Level: 8  Pain Location: Shoulder  Pain Orientation: Left  Vital Signs  Patient Currently in Pain: Yes      Objective       Standing Balance  Time: 7min 30sec  Activity: dyn stand CGA with RW to increase stand jamilah/end for ADL(0 LOB)  Functional Mobility  Functional - Mobility Device: Rolling Walker  Activity: Other(1x level hallway surface for increased distance)  Assist Level: Contact guard assistance  Functional Mobility Comments: pt with slow pace  Bed mobility  Supine to Sit: Supervision  Sit to Supine: Supervision  Transfers  Sit to stand: Contact guard assistance  Stand to sit: Contact guard assistance                    Plan   Plan  Times per week: 3-6 xs/week  Times per day: Daily  Specific instructions for Next Treatment: full ADL  Current Treatment Recommendations: Strengthening, ROM, Balance Training, Functional Mobility Training, Endurance Training, Pain Management, Safety Education & Training, Patient/Caregiver Education & Training, Equipment Evaluation, Education, & procurement, Positioning, Home Management Training, Self-Care / ADL  Plan Comment: Pt. requires skilled OT intervention services needed to increase balance, strength, and endurance needed to increase safety and independence with ADLs, transfers, and functional mobility. Goals  Short term goals  Time Frame for Short term goals: 10 days to 3 weeks or length of IV  Short term goal 1: same as LTG  Short term goal 2: same as LTG  Short term goal 3: same as LTG  Short term goal 4: same as LTG  Short term goal 5: same as LTG  Long term goals  Time Frame for Long term goals : 10 days to 3 weeks or length of IV  Long term goal 1: Pt. will complete UB/LB bathing at mod I level. Long term goal 2: Pt. will complete UB/LB dressing at mod I level. Long term goal 3: Pt. will complete functional transfers at mod I level. Long term goal 4: Pt. will demonstrate good dynamic standing balance during ADL completion with mod I.  Long term goal 5: Pt. will complete BUE strengthening/ROM exercises needed for transfers and ADLs.   Patient Goals   Patient goals : to get better       Therapy Time   Individual Concurrent Group Co-treatment   Time In  10:15         Time Out  10:45         Minutes  [place], [place]

## 2020-05-22 NOTE — PROGRESS NOTES
Physical Therapy  Facility/Department: [facility] MED SURG UNIT  Daily Treatment Note  NAME: Srinivasa Cortes  : 1960  MRN: [id number]    Date of Service: 2020    Discharge Recommendations:  Home with assist PRN, Home with Home health PT        Assessment   Body structures, Functions, Activity limitations: Decreased functional mobility ; Decreased strength;Decreased posture;Decreased balance;Decreased safe awareness  Assessment: [place] with OT this date secondary to low endurance as well as assist for IV pole, catheter and WC follow. Pt very agreeable throughout session and had good follow through with VC's to inc safety with gait as well as transfers. Emptied catheter bag and notified nursing emptied 900 cc's urine prior to gait trial. Pt ambulating inc distance this date with some moderate fatigue post. Pt declined ther ex secondary to fatigue. Pt returned to bed with call light and bed alarm in place. Pt reports no inc in pain post. Continue to progress as jamilah. Treatment Diagnosis: weakness with decreased fucn tinoal mobiltiy independence due to cellulits chest wall  REQUIRES PT FOLLOW UP: Yes     Patient Diagnosis(es): There were no encounter diagnoses. has a past medical history of Hepatitis B, Hepatitis C, Hypothyroid, and Other specified disorders of brain. has no past surgical history on file. Restrictions  Restrictions/Precautions  Restrictions/Precautions: Fall Risk  Required Braces or Orthoses?: No  Subjective   General  Chart Reviewed: Yes  Additional Pertinent Hx: Trach for at least 5 years, now iwth cellulitis of chest wall/pain and  need for antibiotics for 6 weeks  Family / Caregiver Present: No  Referring Practitioner: Dr Beal Do: Pt lying in bed and nodding his head in agreement for therapy. Pt reports some numbness in L knee still and L shoulder pain rated 8/10.    Pain Screening  Patient Currently in Pain: Yes  Pain Assessment  Pain Assessment: 0-10  Pain

## 2020-05-22 NOTE — PROGRESS NOTES
reports desire to be a DNR-CC. Patient reports interest in meeting with hospice closer to [place] from [facility]. [facility] RN advised of above and sticky note left for hospitalist.  This  contacted patient's son Andrew Moreno via phone- per patient's request.  Andrew Moreno reports that he is aware that patient wants to be a [facility] and agreeable with patient's decision. Reviewed care conference. Andrew Moreno reports plan to be available Wed. 5/27 via phone for patient's care conference.   SS to continue to follow     Electronically signed by NANCY Yao on 5/22/2020 at 2:32 PM

## 2020-05-22 NOTE — PROGRESS NOTES
Hospitalist Progress Note      PCP: No primary care provider on file. Date of Admission: 5/19/2020    Chief Complaint: pain    Subjective: pt eating breakfast, looks comfortable     Medications:  Reviewed    Infusion Medications   Scheduled Medications    magnesium citrate  150 mL Oral Once    senna  4 tablet Oral Nightly    lidocaine  5 mL Intradermal Once    sodium chloride flush  10 mL Intravenous 2 times per day    piperacillin-tazobactam  3.375 g Intravenous Q8H    polyethylene glycol  17 g Oral Daily    sennosides-docusate sodium  2 tablet Oral Daily    fentaNYL  1 patch Transdermal Q72H    gabapentin  200 mg Oral TID    meloxicam  7.5 mg Oral Daily    acetaminophen  650 mg Oral 4x Daily    enoxaparin  40 mg Subcutaneous Daily    famotidine  20 mg Oral BID    levothyroxine  75 mcg Oral Daily     PRN Meds: sodium chloride flush, magnesium hydroxide, promethazine **OR** ondansetron, lidocaine, oxyCODONE **OR** oxyCODONE      Intake/Output Summary (Last 24 hours) at 5/22/2020 0828  Last data filed at 5/21/2020 2306  Gross per 24 hour   Intake 220 ml   Output 1400 ml   Net -1180 ml       Exam:    /80   Pulse 64   Temp 97.9 °F (36.6 °C) (Oral)   Resp 18   Ht 5' 5\" (1.651 m)   Wt 140 lb 3.2 oz (63.6 kg)   SpO2 97%   BMI 23.33 kg/m²     General appearance: No apparent distress, appears stated age and cooperative. HEENT: Pupils equal, round, and reactive to light. Conjunctivae/corneas clear. Neck: Supple, with full range of motion. No jugular venous distention. Trachea midline. Respiratory:  Normal respiratory effort. Clear to auscultation, bilaterally without Rales/Wheezes/Rhonchi. Cardiovascular: Regular rate and rhythm with normal S1/S2 without murmurs, rubs or gallops. Abdomen: Soft, non-tender, non-distended with normal bowel sounds. Musculoskeletal: No clubbing, cyanosis or edema bilaterally. Full range of motion without deformity.   Skin:L chest wall redness/edema

## 2020-05-23 PROBLEM — T40.2X5A THERAPEUTIC OPIOID-INDUCED CONSTIPATION (OIC): Status: ACTIVE | Noted: 2020-05-23

## 2020-05-23 PROBLEM — K59.03 THERAPEUTIC OPIOID-INDUCED CONSTIPATION (OIC): Status: ACTIVE | Noted: 2020-05-23

## 2020-05-23 PROCEDURE — 2580000003 HC RX 258

## 2020-05-23 PROCEDURE — 6370000000 HC RX 637 (ALT 250 FOR IP): Performed by: INTERNAL MEDICINE

## 2020-05-23 PROCEDURE — 6360000002 HC RX W HCPCS: Performed by: INTERNAL MEDICINE

## 2020-05-23 PROCEDURE — 6370000000 HC RX 637 (ALT 250 FOR IP): Performed by: ANESTHESIOLOGY

## 2020-05-23 PROCEDURE — 1200000002 HC SEMI PRIVATE SWING BED

## 2020-05-23 PROCEDURE — 2580000003 HC RX 258: Performed by: INTERNAL MEDICINE

## 2020-05-23 PROCEDURE — 97116 GAIT TRAINING THERAPY: CPT

## 2020-05-23 PROCEDURE — 97110 THERAPEUTIC EXERCISES: CPT

## 2020-05-23 RX ORDER — MAGNESIUM HYDROXIDE 1200 MG/15ML
LIQUID ORAL
Status: COMPLETED
Start: 2020-05-23 | End: 2020-05-23

## 2020-05-23 RX ADMIN — Medication 10 ML: at 09:41

## 2020-05-23 RX ADMIN — GABAPENTIN 200 MG: 100 CAPSULE ORAL at 20:06

## 2020-05-23 RX ADMIN — PIPERACILLIN AND TAZOBACTAM 3.38 G: 3; .375 INJECTION, POWDER, LYOPHILIZED, FOR SOLUTION INTRAVENOUS at 05:42

## 2020-05-23 RX ADMIN — OXYCODONE HYDROCHLORIDE 10 MG: 5 TABLET ORAL at 12:08

## 2020-05-23 RX ADMIN — GABAPENTIN 200 MG: 100 CAPSULE ORAL at 07:48

## 2020-05-23 RX ADMIN — OXYCODONE HYDROCHLORIDE 10 MG: 5 TABLET ORAL at 02:35

## 2020-05-23 RX ADMIN — ACETAMINOPHEN 650 MG: 325 TABLET ORAL at 20:06

## 2020-05-23 RX ADMIN — OXYCODONE HYDROCHLORIDE 10 MG: 5 TABLET ORAL at 07:48

## 2020-05-23 RX ADMIN — SENNOSIDES AND DOCUSATE SODIUM 2 TABLET: 8.6; 5 TABLET ORAL at 07:48

## 2020-05-23 RX ADMIN — PIPERACILLIN AND TAZOBACTAM 3.38 G: 3; .375 INJECTION, POWDER, LYOPHILIZED, FOR SOLUTION INTRAVENOUS at 22:55

## 2020-05-23 RX ADMIN — GABAPENTIN 200 MG: 100 CAPSULE ORAL at 12:08

## 2020-05-23 RX ADMIN — ENOXAPARIN SODIUM 40 MG: 40 INJECTION SUBCUTANEOUS at 07:47

## 2020-05-23 RX ADMIN — Medication 10 ML: at 20:07

## 2020-05-23 RX ADMIN — WATER: 100 IRRIGANT IRRIGATION at 13:58

## 2020-05-23 RX ADMIN — FAMOTIDINE 20 MG: 20 TABLET ORAL at 07:48

## 2020-05-23 RX ADMIN — FAMOTIDINE 20 MG: 20 TABLET ORAL at 20:06

## 2020-05-23 RX ADMIN — OXYCODONE HYDROCHLORIDE 10 MG: 5 TABLET ORAL at 16:04

## 2020-05-23 RX ADMIN — MELOXICAM 7.5 MG: 7.5 TABLET ORAL at 07:48

## 2020-05-23 RX ADMIN — OXYCODONE HYDROCHLORIDE 10 MG: 5 TABLET ORAL at 20:06

## 2020-05-23 RX ADMIN — GLYCERIN 2 G: 2 SUPPOSITORY RECTAL at 09:41

## 2020-05-23 RX ADMIN — ACETAMINOPHEN 650 MG: 325 TABLET ORAL at 12:08

## 2020-05-23 RX ADMIN — PIPERACILLIN AND TAZOBACTAM 3.38 G: 3; .375 INJECTION, POWDER, LYOPHILIZED, FOR SOLUTION INTRAVENOUS at 00:01

## 2020-05-23 RX ADMIN — LEVOTHYROXINE SODIUM 75 MCG: 75 TABLET ORAL at 05:44

## 2020-05-23 RX ADMIN — PIPERACILLIN AND TAZOBACTAM 3.38 G: 3; .375 INJECTION, POWDER, LYOPHILIZED, FOR SOLUTION INTRAVENOUS at 14:56

## 2020-05-23 RX ADMIN — POLYETHYLENE GLYCOL 3350 17 G: 17 POWDER, FOR SOLUTION ORAL at 07:47

## 2020-05-23 RX ADMIN — ACETAMINOPHEN 650 MG: 325 TABLET ORAL at 17:03

## 2020-05-23 RX ADMIN — ACETAMINOPHEN 650 MG: 325 TABLET ORAL at 07:47

## 2020-05-23 RX ADMIN — MAGNESIUM CITRATE 150 ML: 1.75 LIQUID ORAL at 09:40

## 2020-05-23 ASSESSMENT — PAIN DESCRIPTION - LOCATION
LOCATION: SHOULDER

## 2020-05-23 ASSESSMENT — PAIN DESCRIPTION - PROGRESSION
CLINICAL_PROGRESSION: GRADUALLY WORSENING
CLINICAL_PROGRESSION: GRADUALLY WORSENING

## 2020-05-23 ASSESSMENT — PAIN SCALES - GENERAL
PAINLEVEL_OUTOF10: 6
PAINLEVEL_OUTOF10: 2
PAINLEVEL_OUTOF10: 7
PAINLEVEL_OUTOF10: 0
PAINLEVEL_OUTOF10: 7
PAINLEVEL_OUTOF10: 7
PAINLEVEL_OUTOF10: 8
PAINLEVEL_OUTOF10: 7
PAINLEVEL_OUTOF10: 8
PAINLEVEL_OUTOF10: 8
PAINLEVEL_OUTOF10: 6
PAINLEVEL_OUTOF10: 7

## 2020-05-23 ASSESSMENT — PAIN DESCRIPTION - FREQUENCY
FREQUENCY: CONTINUOUS

## 2020-05-23 ASSESSMENT — PAIN DESCRIPTION - PAIN TYPE
TYPE: ACUTE PAIN

## 2020-05-23 ASSESSMENT — PAIN DESCRIPTION - DESCRIPTORS
DESCRIPTORS: ACHING;DISCOMFORT
DESCRIPTORS: ACHING;SORE

## 2020-05-23 ASSESSMENT — PAIN - FUNCTIONAL ASSESSMENT
PAIN_FUNCTIONAL_ASSESSMENT: PREVENTS OR INTERFERES SOME ACTIVE ACTIVITIES AND ADLS

## 2020-05-23 ASSESSMENT — ENCOUNTER SYMPTOMS
GASTROINTESTINAL NEGATIVE: 1
TROUBLE SWALLOWING: 1
VOICE CHANGE: 1
FACIAL SWELLING: 1
SORE THROAT: 1
ALLERGIC/IMMUNOLOGIC NEGATIVE: 1
BACK PAIN: 0
COLOR CHANGE: 1
SINUS PRESSURE: 0
SINUS PAIN: 0
RESPIRATORY NEGATIVE: 1
EYES NEGATIVE: 1

## 2020-05-23 ASSESSMENT — PAIN DESCRIPTION - ONSET
ONSET: ON-GOING

## 2020-05-23 ASSESSMENT — PAIN DESCRIPTION - ORIENTATION
ORIENTATION: LEFT

## 2020-05-23 NOTE — CARE COORDINATION
Hospice consult called to [facility] ans serv [phone].    9:32am [facility] called and will be contacting son, Tylor Godoy to set up a meeting. 10:04am DNRCC form completed and signed by Dr Robert Joyce. 10:06am [facility] s/w son. He will be here tomorrow,@ 1 pm. Wife will accompany. Son, Sultana East has broke his hand and is unable to write.

## 2020-05-23 NOTE — PROGRESS NOTES
Yes  Ambulation 1  Surface: level tile  Device: Rolling Walker  Other Apparatus: (w/c follow )  Assistance: Contact guard assistance  Quality of Gait: slow pace, unsteady gait, tends to stand retro to walker with cues. Gait Deviations: Slow Denise; Increased ROSEMARIE; Decreased step length;Decreased step height;Decreased head and trunk rotation  Distance: 100'   Comments: Gait quality decreased with distance and fatigue. Reported slight dizziness with gait. Did resolve with seated rest break. Balance  Posture: Fair  Sitting - Static: Good;-  Sitting - Dynamic: Fair;+  Standing - Static: Fair;-  Standing - Dynamic: Fair  Exercises  Quad Sets: x30  Gluteal Sets: x30  Hip Abduction: x20  Ankle Pumps: x30                        G-Code     OutComes Score                                                     AM-PAC Score             Goals  Short term goals  Time Frame for Short term goals: 1-2 weeks  Short term goal 1: transfer sit to stand with SBA and <1-2 vc for safety  Short term goal 2: bed mobility with <= SBA and increased time  Short term goal 3: amb >= 50ft before fatigued with CGA and ww  Short term goal 4: assess curb step wtih ww and Min A of 1  Long term goals  Time Frame for Long term goals : 6 weeks or length of IV needs   Long term goal 1: Modified independent bed mobility  Long term goal 2: Modified independent transfers with least AD  Long term goal 3: amb >=100ft with least AD modified independent  Long term goal 4: 1 curb step with AD and SBA to enter/exit home  Long term goal 5: indep with HEP to improve LE strength,   Patient Goals   Patient goals : \" Get strogner to go home. \" via mouthing words    Plan    Plan  Times per week: 5-7x week  Times per day: (QD to BID )  Plan weeks: 6 weeks or legnth of IVs   Current Treatment Recommendations: Strengthening, Functional Mobility Training, Transfer Training, Balance Training, Endurance Training, Stair training, Gait Training, Pain Management, Patient/Caregiver Education & Training, Safety Education & Training, Manual Therapy - Joint Manipulation, Home Exercise Program, Equipment Evaluation, Education, & procurement, Positioning  Plan Comment: Cont. POC  Safety Devices  Type of devices:  All fall risk precautions in place, Call light within reach     Therapy Time   Individual Concurrent Group Co-treatment   Time In  940         Time Out  1025         Minutes  [address] [place] [address]  License and Ivonne [address] Number: [id number]

## 2020-05-23 NOTE — PROGRESS NOTES
extended infusion (mini-bag)  3.375 g Intravenous Q8H Zonia Monaco MD 12.5 mL/hr at 05/23/20 0542 3.375 g at 05/23/20 0542    polyethylene glycol (GLYCOLAX) packet 17 g  17 g Oral Daily Joycelyn Salgado MD   17 g at 05/23/20 0747    sennosides-docusate sodium (SENOKOT-S) 8.6-50 MG tablet 2 tablet  2 tablet Oral Daily Joycelyn Salgado MD   2 tablet at 05/23/20 0748    magnesium hydroxide (MILK OF MAGNESIA) 400 MG/5ML suspension 30 mL  30 mL Oral Daily PRN Joycelyn Salgado MD        fentaNYL (DURAGESIC) 12 MCG/HR 1 patch  1 patch Transdermal Q72H Stephanie Moreno MD   1 patch at 05/20/20 1955    gabapentin (NEURONTIN) capsule 200 mg  200 mg Oral TID Stephanie Moreno MD   200 mg at 05/23/20 0748    meloxicam (MOBIC) tablet 7.5 mg  7.5 mg Oral Daily Stephanie Moreno MD   7.5 mg at 05/23/20 0748    acetaminophen (TYLENOL) tablet 650 mg  650 mg Oral 4x Daily [facility]   650 mg at 05/23/20 0747    enoxaparin (LOVENOX) injection 40 mg  40 mg Subcutaneous Daily [facility] DO   40 mg at 05/23/20 0747    promethazine (PHENERGAN) tablet 12.5 mg  12.5 mg Oral Q6H PRN [facility] DO        Or    ondansetron [facility]) injection 4 mg  4 mg Intravenous Q6H PRN [facility] DO        famotidine (PEPCID) tablet 20 mg  20 mg Oral BID [facility] DO   20 mg at 05/23/20 0748    levothyroxine (SYNTHROID) tablet 75 mcg  75 mcg Oral Daily [facility] DO   75 mcg at 05/23/20 0544    lidocaine (LMX) 4 % cream   Topical Q4H PRN [facility] DO        oxyCODONE (ROXICODONE) immediate release tablet 5 mg  5 mg Oral Q4H PRN [facility] DO   5 mg at 05/19/20 1647    Or    oxyCODONE (ROXICODONE) immediate release tablet 10 mg  10 mg Oral Q4H PRN [facility] DO   10 mg at 05/23/20 9303         ALLERGIES:  Patient has no known allergies. Review of Systems   Constitutional: Positive for activity change, appetite change, fatigue and unexpected weight change. Negative for chills, diaphoresis and fever.    HENT:

## 2020-05-23 NOTE — PROGRESS NOTES
motion without deformity. Skin:L chest wall redness/edema   Neurologic:  Neurovascularly intact without any focal sensory/motor deficits. Cranial nerves: II-XII intact, grossly non-focal.  Psychiatric: Alert and oriented,   Capillary Refill: Brisk,< 3 seconds   Peripheral Pulses: +2 palpable, equal bilaterally       Labs:   No results for input(s): WBC, HGB, HCT, PLT in the last 72 hours. No results for input(s): NA, K, CL, CO2, BUN, CREATININE, CALCIUM, PHOS in the last 72 hours. Invalid input(s): MAGNES  No results for input(s): AST, ALT, BILIDIR, BILITOT, ALKPHOS in the last 72 hours. No results for input(s): INR in the last 72 hours. No results for input(s): [place] in the last 72 hours.     Urinalysis:      Lab Results   Component Value Date    NITRU Negative 05/14/2020    WBCUA 0-2 05/14/2020    BACTERIA Negative 05/14/2020    RBCUA 0-2 05/14/2020    BLOODU Negative 05/14/2020    SPECGRAV 1.025 05/14/2020    GLUCOSEU Negative 05/14/2020       Radiology:  IR Picc Equal or Greater Than 5 Years    (Results Pending)           Assessment/Plan:    Active Hospital Problems    Diagnosis Date Noted    Streptococcal bacteremia [R78.81, B95.5]     Chest wall pain [R07.89] 05/20/2020    Neck pain on left side [M54.2] 05/20/2020    Septic arthritis of AC joint ([facility] [address] [M00.9] 05/20/2020    Thigh pain, musculoskeletal, left [M79.652] 05/20/2020    Neuropathic pain of thigh, left [M79.2] 05/20/2020    Laryngeal carcinoma ([facility] [address] [C32.9] 05/14/2020    Cellulitis [L03.90] 05/14/2020    Frequent falls [R29.6] 05/14/2020    Weakness [R53.1] 05/14/2020    Gastroesophageal reflux [K21.9] 05/14/2020         DVT Prophylaxis: lovenox  Diet: Dietary Nutrition Supplements: Frozen Oral Supplement  DIET GENERAL; Dysphagia Soft and Bite-Sized; Mildly Thick (Nectar)  Dietary Nutrition Supplements: Other Oral Supplement (see comment)  Code Status: DNR-CC    PT/OT Eval Status: done    Dispo - Bacteremia, septic

## 2020-05-24 PROCEDURE — 6370000000 HC RX 637 (ALT 250 FOR IP): Performed by: ANESTHESIOLOGY

## 2020-05-24 PROCEDURE — 97116 GAIT TRAINING THERAPY: CPT

## 2020-05-24 PROCEDURE — 6360000002 HC RX W HCPCS: Performed by: INTERNAL MEDICINE

## 2020-05-24 PROCEDURE — 1200000002 HC SEMI PRIVATE SWING BED

## 2020-05-24 PROCEDURE — 6370000000 HC RX 637 (ALT 250 FOR IP): Performed by: INTERNAL MEDICINE

## 2020-05-24 PROCEDURE — 89220 SPUTUM SPECIMEN COLLECTION: CPT

## 2020-05-24 PROCEDURE — 2580000003 HC RX 258: Performed by: INTERNAL MEDICINE

## 2020-05-24 PROCEDURE — 97530 THERAPEUTIC ACTIVITIES: CPT

## 2020-05-24 PROCEDURE — 6360000002 HC RX W HCPCS: Performed by: ANESTHESIOLOGY

## 2020-05-24 PROCEDURE — 97110 THERAPEUTIC EXERCISES: CPT

## 2020-05-24 RX ADMIN — ACETAMINOPHEN 650 MG: 325 TABLET ORAL at 08:22

## 2020-05-24 RX ADMIN — GABAPENTIN 200 MG: 100 CAPSULE ORAL at 19:53

## 2020-05-24 RX ADMIN — PIPERACILLIN AND TAZOBACTAM 3.38 G: 3; .375 INJECTION, POWDER, LYOPHILIZED, FOR SOLUTION INTRAVENOUS at 06:41

## 2020-05-24 RX ADMIN — OXYCODONE HYDROCHLORIDE 10 MG: 5 TABLET ORAL at 00:06

## 2020-05-24 RX ADMIN — FAMOTIDINE 20 MG: 20 TABLET ORAL at 19:53

## 2020-05-24 RX ADMIN — GABAPENTIN 200 MG: 100 CAPSULE ORAL at 14:40

## 2020-05-24 RX ADMIN — OXYCODONE HYDROCHLORIDE 10 MG: 5 TABLET ORAL at 08:22

## 2020-05-24 RX ADMIN — OXYCODONE HYDROCHLORIDE 10 MG: 5 TABLET ORAL at 16:33

## 2020-05-24 RX ADMIN — OXYCODONE HYDROCHLORIDE 10 MG: 5 TABLET ORAL at 20:34

## 2020-05-24 RX ADMIN — ACETAMINOPHEN 650 MG: 325 TABLET ORAL at 12:26

## 2020-05-24 RX ADMIN — ACETAMINOPHEN 650 MG: 325 TABLET ORAL at 19:53

## 2020-05-24 RX ADMIN — OXYCODONE HYDROCHLORIDE 10 MG: 5 TABLET ORAL at 12:27

## 2020-05-24 RX ADMIN — ACETAMINOPHEN 650 MG: 325 TABLET ORAL at 16:33

## 2020-05-24 RX ADMIN — POLYETHYLENE GLYCOL 3350 17 G: 17 POWDER, FOR SOLUTION ORAL at 08:21

## 2020-05-24 RX ADMIN — MELOXICAM 7.5 MG: 7.5 TABLET ORAL at 08:22

## 2020-05-24 RX ADMIN — METHYLNALTREXONE BROMIDE 12 MG: 12 INJECTION, SOLUTION SUBCUTANEOUS at 08:21

## 2020-05-24 RX ADMIN — LEVOTHYROXINE SODIUM 75 MCG: 75 TABLET ORAL at 06:41

## 2020-05-24 RX ADMIN — OXYCODONE HYDROCHLORIDE 10 MG: 5 TABLET ORAL at 04:01

## 2020-05-24 RX ADMIN — FAMOTIDINE 20 MG: 20 TABLET ORAL at 08:22

## 2020-05-24 RX ADMIN — ENOXAPARIN SODIUM 40 MG: 40 INJECTION SUBCUTANEOUS at 08:21

## 2020-05-24 RX ADMIN — PIPERACILLIN AND TAZOBACTAM 3.38 G: 3; .375 INJECTION, POWDER, LYOPHILIZED, FOR SOLUTION INTRAVENOUS at 14:40

## 2020-05-24 RX ADMIN — Medication 10 ML: at 19:53

## 2020-05-24 RX ADMIN — GABAPENTIN 200 MG: 100 CAPSULE ORAL at 08:22

## 2020-05-24 RX ADMIN — SENNOSIDES AND DOCUSATE SODIUM 2 TABLET: 8.6; 5 TABLET ORAL at 08:22

## 2020-05-24 ASSESSMENT — PAIN SCALES - GENERAL
PAINLEVEL_OUTOF10: 7
PAINLEVEL_OUTOF10: 7
PAINLEVEL_OUTOF10: 4
PAINLEVEL_OUTOF10: 7
PAINLEVEL_OUTOF10: 4
PAINLEVEL_OUTOF10: 7
PAINLEVEL_OUTOF10: 5
PAINLEVEL_OUTOF10: 0
PAINLEVEL_OUTOF10: 7
PAINLEVEL_OUTOF10: 7
PAINLEVEL_OUTOF10: 2
PAINLEVEL_OUTOF10: 4

## 2020-05-24 ASSESSMENT — PAIN DESCRIPTION - PROGRESSION
CLINICAL_PROGRESSION: GRADUALLY WORSENING
CLINICAL_PROGRESSION: GRADUALLY WORSENING
CLINICAL_PROGRESSION: GRADUALLY IMPROVING
CLINICAL_PROGRESSION: GRADUALLY WORSENING
CLINICAL_PROGRESSION: NOT CHANGED
CLINICAL_PROGRESSION: GRADUALLY WORSENING

## 2020-05-24 ASSESSMENT — PAIN DESCRIPTION - DESCRIPTORS
DESCRIPTORS: ACHING;DISCOMFORT

## 2020-05-24 ASSESSMENT — PAIN DESCRIPTION - ONSET
ONSET: ON-GOING

## 2020-05-24 ASSESSMENT — PAIN DESCRIPTION - ORIENTATION
ORIENTATION: LEFT

## 2020-05-24 ASSESSMENT — PAIN - FUNCTIONAL ASSESSMENT
PAIN_FUNCTIONAL_ASSESSMENT: PREVENTS OR INTERFERES SOME ACTIVE ACTIVITIES AND ADLS

## 2020-05-24 ASSESSMENT — PAIN DESCRIPTION - FREQUENCY
FREQUENCY: CONTINUOUS

## 2020-05-24 ASSESSMENT — PAIN DESCRIPTION - PAIN TYPE
TYPE: ACUTE PAIN

## 2020-05-24 ASSESSMENT — PAIN DESCRIPTION - LOCATION
LOCATION: SHOULDER

## 2020-05-24 NOTE — PROGRESS NOTES
I went in and spent time with patient explaining full code, CCA, and CC. Patient is alert and oriented. I explained each, and he shock his head that he understood each. He does not want CPR, intubation, TCP, or PEG tube. He wants no heroic measures. [facility] form stands as patient's wishes and is on the thin chart.

## 2020-05-24 NOTE — PROGRESS NOTES
Physical Therapy  Facility/Department: [facility] MED SURG UNIT  Daily Treatment Note  NAME: Flaco Norton  : 1960  MRN: [id number]    Date of Service: 2020    Discharge Recommendations:  Home with assist PRN, Home with Home health PT        Assessment   Body structures, Functions, Activity limitations: Decreased functional mobility ; Decreased strength;Decreased posture;Decreased balance;Decreased safe awareness  Assessment: Patient able to walk without symptom increase this date ; some form breakdown (wobble) with gait at end of walk   Treatment Diagnosis: weakness with decreased fucn tinoal mobiltiy independence due to cellulits chest wall  Prognosis: Good  REQUIRES PT FOLLOW UP: Yes  Activity Tolerance  Activity Tolerance: Patient Tolerated treatment well;Patient limited by pain     Patient Diagnosis(es): There were no encounter diagnoses. has a past medical history of Hepatitis B, Hepatitis C, Hypothyroid, and Other specified disorders of brain. has no past surgical history on file. Restrictions  Restrictions/Precautions  Restrictions/Precautions: Fall Risk  Required Braces or Orthoses?: No  Subjective   General  Chart Reviewed: Yes  Additional Pertinent Hx: Trach for at least 5 years, now iwth cellulitis of chest wall/pain and  need for antibiotics for 6 weeks  Family / Caregiver Present: No  Referring Practitioner: Dr Rox Adam: Jami reports that the IVs are a \"pain\" but he is willing to go for a walk today.    Pain Screening  Patient Currently in Pain: Denies  Pain Assessment  Pain Assessment: 0-10  Vital Signs  Patient Currently in Pain: Denies  Oxygen Therapy  SpO2: 94 %  O2 Device: None (Room air)  Pre Treatment Pain Screening  Pain at present: 6  Scale Used: Numeric Score  Intervention List: Patient able to continue with treatment    Orientation     Cognition      Objective      Transfers  Sit to Stand: Contact guard assistance  Stand to sit: Stand by assistance  Comment: Shaky upon standing  Ambulation  Ambulation?: Yes  Ambulation 1  Surface: level tile  Device: Rolling Walker  Other Apparatus: (w/c follow via nurse)  Assistance: Contact guard assistance  Quality of Gait: Intermittent wobble  Gait Deviations: Decreased step height;Decreased step length  Distance: 100' with one 180° turn  Comments: Form breakdown at end of walk     Balance  Posture: Fair  Sitting - Static: Fair;+  Sitting - Dynamic: Fair  Standing - Static: Fair;-  Standing - Dynamic: Poor;+  Exercises  Quad Sets: x 20  Gluteal Sets: x 20  Hip Abduction: x 20  Knee Short Arc Quad: x 20  Ankle Pumps: x 20                        G-Code     OutComes Score                                                     AM-PAC Score             Goals  Short term goals  Time Frame for Short term goals: 1-2 weeks  Short term goal 1: transfer sit to stand with SBA and <1-2 vc for safety  Short term goal 2: bed mobility with <= SBA and increased time  Short term goal 3: amb >= 50ft before fatigued with CGA and ww  Short term goal 4: assess curb step wtih ww and Min A of 1  Long term goals  Time Frame for Long term goals : 6 weeks or length of IV needs   Long term goal 1: Modified independent bed mobility  Long term goal 2: Modified independent transfers with least AD  Long term goal 3: amb >=100ft with least AD modified independent  Long term goal 4: 1 curb step with AD and SBA to enter/exit home  Long term goal 5: indep with HEP to improve LE strength,   Patient Goals   Patient goals : \" Get strogner to go home. \" via mouthing words    Plan    Plan  Times per week: 5-7x week  Times per day: (QD to BID )  Plan weeks: 6 weeks or legnth of IVs   Current Treatment Recommendations: Strengthening, Functional Mobility Training, Transfer Training, Balance Training, Endurance Training, Stair training, Gait Training, Pain Management, Patient/Caregiver Education & Training, Safety Education & Training, Manual Therapy - Joint

## 2020-05-24 NOTE — PROGRESS NOTES
Hospitalist Progress Note      PCP: No primary care provider on file. Date of Admission: 5/19/2020    Chief Complaint: pain, constipation     Subjective: pt eating breakfast    Medications:  Reviewed    Infusion Medications   Scheduled Medications    lidocaine  5 mL Intradermal Once    sodium chloride flush  10 mL Intravenous 2 times per day    piperacillin-tazobactam  3.375 g Intravenous Q8H    polyethylene glycol  17 g Oral Daily    sennosides-docusate sodium  2 tablet Oral Daily    fentaNYL  1 patch Transdermal Q72H    gabapentin  200 mg Oral TID    meloxicam  7.5 mg Oral Daily    acetaminophen  650 mg Oral 4x Daily    enoxaparin  40 mg Subcutaneous Daily    famotidine  20 mg Oral BID    levothyroxine  75 mcg Oral Daily     PRN Meds: methylnaltrexone, sodium chloride flush, magnesium hydroxide, promethazine **OR** ondansetron, lidocaine, oxyCODONE **OR** oxyCODONE      Intake/Output Summary (Last 24 hours) at 5/24/2020 0832  Last data filed at 5/24/2020 0646  Gross per 24 hour   Intake 470 ml   Output 1850 ml   Net -1380 ml       Exam:    /64   Pulse 63   Temp 98.4 °F (36.9 °C)   Resp 18   Ht 5' 5\" (1.651 m)   Wt 140 lb 3.2 oz (63.6 kg)   SpO2 96%   BMI 23.33 kg/m²     General appearance: No apparent distress, appears stated age and cooperative. HEENT: Pupils equal, round, and reactive to light. Conjunctivae/corneas clear. Neck: Supple, with full range of motion. No jugular venous distention. Trachea midline. Respiratory:  Normal respiratory effort. Clear to auscultation, bilaterally without Rales/Wheezes/Rhonchi. Cardiovascular: Regular rate and rhythm with normal S1/S2 without murmurs, rubs or gallops. Abdomen: Soft, non-tender, non-distended with normal bowel sounds. Musculoskeletal: No clubbing, cyanosis or edema bilaterally. Full range of motion without deformity.   Skin:L chest wall redness/edema   Neurologic:  Neurovascularly intact without any focal sensory/motor deficits. Cranial nerves: II-XII intact, grossly non-focal.  Psychiatric: Alert and oriented, thought content appropriate, normal insight  Capillary Refill: Brisk,< 3 seconds   Peripheral Pulses: +2 palpable, equal bilaterally       Labs:   No results for input(s): WBC, HGB, HCT, PLT in the last 72 hours. No results for input(s): NA, K, CL, CO2, BUN, CREATININE, CALCIUM, PHOS in the last 72 hours. Invalid input(s): MAGNES  No results for input(s): AST, ALT, BILIDIR, BILITOT, ALKPHOS in the last 72 hours. No results for input(s): INR in the last 72 hours. No results for input(s): Fernanda Ivel in the last 72 hours.     Urinalysis:      Lab Results   Component Value Date    NITRU Negative 05/14/2020    WBCUA 0-2 05/14/2020    BACTERIA Negative 05/14/2020    RBCUA 0-2 05/14/2020    BLOODU Negative 05/14/2020    SPECGRAV 1.025 05/14/2020    GLUCOSEU Negative 05/14/2020       Radiology:  IR Picc Equal or Greater Than 5 Years    (Results Pending)           Assessment/Plan:    Active Hospital Problems    Diagnosis Date Noted    Therapeutic opioid-induced constipation (OIC) [K59.03, T40.2X5A] 05/23/2020    Streptococcal bacteremia [R78.81, B95.5]     Chest wall pain [R07.89] 05/20/2020    Neck pain on left side [M54.2] 05/20/2020    Septic arthritis of AC joint ([facility] [address] [M00.9] 05/20/2020    Thigh pain, musculoskeletal, left [M79.652] 05/20/2020    Neuropathic pain of thigh, left [M79.2] 05/20/2020    Laryngeal carcinoma ([facility] [address] [C32.9] 05/14/2020    Cellulitis [L03.90] 05/14/2020    Frequent falls [R29.6] 05/14/2020    Weakness [R53.1] 05/14/2020    Gastroesophageal reflux [K21.9] 05/14/2020         DVT Prophylaxis: lovenox  Diet: Dietary Nutrition Supplements: Frozen Oral Supplement  DIET GENERAL; Dysphagia Soft and Bite-Sized; Mildly Thick (Nectar)  Dietary Nutrition Supplements: Other Oral Supplement (see comment)  Code Status: DNR-CC    PT/OT Eval Status: done    Dispo - Bacteremia, septic sternoclavicular joint arthritis- atbs per ID recommendations changed to zosyn, repeat BC tomorrow    Pain due to above- per pain management  History of laryngeal CA- post tracheostomy insertion   Prolong hospital stay, weakness- rehab orders initiated  Constipation-persist- see orders   Hyponatremia on admission at [facility]- resolved, follow up with BMP tomorrow   Pt requested hospice meeting-coming today   Medically stable for skilled admission at [facility]      Electronically signed by Janna Hameed MD on 5/24/2020 at 8:32 AM

## 2020-05-24 NOTE — PROGRESS NOTES
Met with pt and family for hospice information. Pt and family report they would like to seek aggressive treatment and they are not interested in Hospice at this time. Family and pt would like more information on code status, prognosis, home health and test/imaging results. Directed family and pt questions/concerns to facility staff. Report to [facility], RN of family requests.

## 2020-05-25 LAB
ANION GAP SERPL CALCULATED.3IONS-SCNC: 10 MEQ/L (ref 9–15)
BUN BLDV-MCNC: 5 MG/DL (ref 8–23)
C-REACTIVE PROTEIN, HIGH SENSITIVITY: 48.7 MG/L (ref 0–5)
CALCIUM SERPL-MCNC: 9.3 MG/DL (ref 8.5–9.9)
CHLORIDE BLD-SCNC: 101 MEQ/L (ref 95–107)
CO2: 27 MEQ/L (ref 20–31)
CREAT SERPL-MCNC: 0.46 MG/DL (ref 0.7–1.2)
GFR AFRICAN AMERICAN: >60
GFR NON-AFRICAN AMERICAN: >60
GLUCOSE BLD-MCNC: 89 MG/DL (ref 70–99)
HCT VFR BLD CALC: 35.7 % (ref 42–52)
HEMOGLOBIN: 11.7 G/DL (ref 14–18)
MCH RBC QN AUTO: 31 PG (ref 27–31.3)
MCHC RBC AUTO-ENTMCNC: 32.8 % (ref 33–37)
MCV RBC AUTO: 94.6 FL (ref 80–100)
PDW BLD-RTO: 14.5 % (ref 11.5–14.5)
PLATELET # BLD: 610 K/UL (ref 130–400)
POTASSIUM SERPL-SCNC: 4 MEQ/L (ref 3.4–4.9)
RBC # BLD: 3.77 M/UL (ref 4.7–6.1)
SODIUM BLD-SCNC: 138 MEQ/L (ref 135–144)
WBC # BLD: 6.1 K/UL (ref 4.8–10.8)

## 2020-05-25 PROCEDURE — 6370000000 HC RX 637 (ALT 250 FOR IP): Performed by: ANESTHESIOLOGY

## 2020-05-25 PROCEDURE — 1200000002 HC SEMI PRIVATE SWING BED

## 2020-05-25 PROCEDURE — 6370000000 HC RX 637 (ALT 250 FOR IP): Performed by: INTERNAL MEDICINE

## 2020-05-25 PROCEDURE — 2580000003 HC RX 258: Performed by: INTERNAL MEDICINE

## 2020-05-25 PROCEDURE — 80048 BASIC METABOLIC PNL TOTAL CA: CPT

## 2020-05-25 PROCEDURE — 6360000002 HC RX W HCPCS: Performed by: INTERNAL MEDICINE

## 2020-05-25 PROCEDURE — 87040 BLOOD CULTURE FOR BACTERIA: CPT

## 2020-05-25 PROCEDURE — 97116 GAIT TRAINING THERAPY: CPT

## 2020-05-25 PROCEDURE — 97110 THERAPEUTIC EXERCISES: CPT

## 2020-05-25 PROCEDURE — 36415 COLL VENOUS BLD VENIPUNCTURE: CPT

## 2020-05-25 PROCEDURE — 86141 C-REACTIVE PROTEIN HS: CPT

## 2020-05-25 PROCEDURE — 85027 COMPLETE CBC AUTOMATED: CPT

## 2020-05-25 RX ADMIN — PIPERACILLIN AND TAZOBACTAM 3.38 G: 3; .375 INJECTION, POWDER, LYOPHILIZED, FOR SOLUTION INTRAVENOUS at 00:07

## 2020-05-25 RX ADMIN — ACETAMINOPHEN 650 MG: 325 TABLET ORAL at 12:41

## 2020-05-25 RX ADMIN — GABAPENTIN 200 MG: 100 CAPSULE ORAL at 12:42

## 2020-05-25 RX ADMIN — OXYCODONE HYDROCHLORIDE 10 MG: 5 TABLET ORAL at 06:01

## 2020-05-25 RX ADMIN — GABAPENTIN 200 MG: 100 CAPSULE ORAL at 19:46

## 2020-05-25 RX ADMIN — PIPERACILLIN AND TAZOBACTAM 3.38 G: 3; .375 INJECTION, POWDER, LYOPHILIZED, FOR SOLUTION INTRAVENOUS at 06:01

## 2020-05-25 RX ADMIN — Medication 10 ML: at 19:47

## 2020-05-25 RX ADMIN — ACETAMINOPHEN 650 MG: 325 TABLET ORAL at 19:47

## 2020-05-25 RX ADMIN — ACETAMINOPHEN 650 MG: 325 TABLET ORAL at 09:05

## 2020-05-25 RX ADMIN — GABAPENTIN 200 MG: 100 CAPSULE ORAL at 09:03

## 2020-05-25 RX ADMIN — OXYCODONE HYDROCHLORIDE 10 MG: 5 TABLET ORAL at 18:01

## 2020-05-25 RX ADMIN — PIPERACILLIN AND TAZOBACTAM 3.38 G: 3; .375 INJECTION, POWDER, LYOPHILIZED, FOR SOLUTION INTRAVENOUS at 23:24

## 2020-05-25 RX ADMIN — OXYCODONE HYDROCHLORIDE 10 MG: 5 TABLET ORAL at 01:19

## 2020-05-25 RX ADMIN — OXYCODONE HYDROCHLORIDE 10 MG: 5 TABLET ORAL at 14:04

## 2020-05-25 RX ADMIN — LEVOTHYROXINE SODIUM 75 MCG: 75 TABLET ORAL at 06:01

## 2020-05-25 RX ADMIN — FAMOTIDINE 20 MG: 20 TABLET ORAL at 09:03

## 2020-05-25 RX ADMIN — MELOXICAM 7.5 MG: 7.5 TABLET ORAL at 09:03

## 2020-05-25 RX ADMIN — OXYCODONE HYDROCHLORIDE 10 MG: 5 TABLET ORAL at 21:52

## 2020-05-25 RX ADMIN — ACETAMINOPHEN 650 MG: 325 TABLET ORAL at 18:01

## 2020-05-25 RX ADMIN — OXYCODONE HYDROCHLORIDE 10 MG: 5 TABLET ORAL at 10:08

## 2020-05-25 RX ADMIN — PIPERACILLIN AND TAZOBACTAM: 3; .375 INJECTION, POWDER, LYOPHILIZED, FOR SOLUTION INTRAVENOUS at 14:00

## 2020-05-25 RX ADMIN — FAMOTIDINE 20 MG: 20 TABLET ORAL at 19:47

## 2020-05-25 ASSESSMENT — PAIN SCALES - GENERAL
PAINLEVEL_OUTOF10: 7
PAINLEVEL_OUTOF10: 7
PAINLEVEL_OUTOF10: 8
PAINLEVEL_OUTOF10: 6
PAINLEVEL_OUTOF10: 7
PAINLEVEL_OUTOF10: 6
PAINLEVEL_OUTOF10: 7
PAINLEVEL_OUTOF10: 7
PAINLEVEL_OUTOF10: 0
PAINLEVEL_OUTOF10: 7
PAINLEVEL_OUTOF10: 8
PAINLEVEL_OUTOF10: 8

## 2020-05-25 ASSESSMENT — PAIN DESCRIPTION - PROGRESSION: CLINICAL_PROGRESSION: GRADUALLY WORSENING

## 2020-05-25 NOTE — PROGRESS NOTES
PROGRESS NOTE -PAIN MANAGEMENT     SERVICE DATE:  5/25/2020   SERVICE TIME:  1:38 PM    CHIEFCOMPLAINT: Left upper chest wall pain due to sternoclavicular joint infection/septic arthritis      SUBJECTIVE:  Mr. Mary Lou Arguello is a 60 y.o. male who presentedfor antibiotic therapy after had septic arthritis of the left sternoclavicular joint. Patient has history of laryngeal cancer, no active treatment at this time. Has a trach change by ENT at [facility].  Receiving antibiotic treatment for his infection.     Patient states  pain is well controlled on the current adjustment of the low-dose Duragesic patch and meloxicam and oxycodone    PAIN  ASSESSMENT:    intermittent    aching    pain is perceived as moderate (4-6 pain scale)      MEDICATIONS:    Current Facility-Administered Medications   Medication Dose Route Frequency Provider Last Rate Last Dose    methylnaltrexone (RELISTOR) injection 12 mg  12 mg Subcutaneous Q48H PRN Kimberly Herr MD   12 mg at 05/24/20 0821    lidocaine 2 % injection 5 mL  5 mL Intradermal Once Mykel Jeffery MD        sodium chloride flush 0.9 % injection 10 mL  10 mL Intravenous 2 times per day Mykel Jeffery MD   10 mL at 05/24/20 1953    sodium chloride flush 0.9 % injection 10 mL  10 mL Intravenous PRN Yvonne Monaco MD        piperacillin-tazobactam (ZOSYN) 3.375 g in dextrose 5 % 50 mL IVPB extended infusion (mini-bag)  3.375 g Intravenous Q8H Zonia Monaco MD   Stopped at 05/25/20 1002    polyethylene glycol (GLYCOLAX) packet 17 g  17 g Oral Daily Rodney Ma MD   17 g at 05/24/20 8039    sennosides-docusate sodium (SENOKOT-S) 8.6-50 MG tablet 2 tablet  2 tablet Oral Daily Rodney Ma MD   2 tablet at 05/24/20 1902    magnesium hydroxide (MILK OF MAGNESIA) 400 MG/5ML suspension 30 mL  30 mL Oral Daily PRN Rodney Ma MD        fentaNYL (DURAGESIC) 12 MCG/HR 1 patch  1 patch Transdermal Q72H Kimberly Herr MD   1 patch at 05/23/20 2005    gabapentin (NEURONTIN) capsule [phone]

## 2020-05-26 PROCEDURE — 97530 THERAPEUTIC ACTIVITIES: CPT

## 2020-05-26 PROCEDURE — 1200000002 HC SEMI PRIVATE SWING BED

## 2020-05-26 PROCEDURE — 6360000002 HC RX W HCPCS: Performed by: INTERNAL MEDICINE

## 2020-05-26 PROCEDURE — 6370000000 HC RX 637 (ALT 250 FOR IP): Performed by: INTERNAL MEDICINE

## 2020-05-26 PROCEDURE — 97110 THERAPEUTIC EXERCISES: CPT

## 2020-05-26 PROCEDURE — 6370000000 HC RX 637 (ALT 250 FOR IP): Performed by: ANESTHESIOLOGY

## 2020-05-26 PROCEDURE — 2580000003 HC RX 258: Performed by: INTERNAL MEDICINE

## 2020-05-26 PROCEDURE — 97116 GAIT TRAINING THERAPY: CPT

## 2020-05-26 PROCEDURE — 97535 SELF CARE MNGMENT TRAINING: CPT

## 2020-05-26 RX ADMIN — Medication 10 ML: at 19:55

## 2020-05-26 RX ADMIN — SENNOSIDES AND DOCUSATE SODIUM 2 TABLET: 8.6; 5 TABLET ORAL at 07:45

## 2020-05-26 RX ADMIN — OXYCODONE HYDROCHLORIDE 10 MG: 5 TABLET ORAL at 19:54

## 2020-05-26 RX ADMIN — FAMOTIDINE 20 MG: 20 TABLET ORAL at 19:54

## 2020-05-26 RX ADMIN — OXYCODONE HYDROCHLORIDE 10 MG: 5 TABLET ORAL at 12:06

## 2020-05-26 RX ADMIN — OXYCODONE HYDROCHLORIDE 10 MG: 5 TABLET ORAL at 16:00

## 2020-05-26 RX ADMIN — GABAPENTIN 200 MG: 100 CAPSULE ORAL at 12:05

## 2020-05-26 RX ADMIN — Medication 10 ML: at 10:09

## 2020-05-26 RX ADMIN — Medication 10 ML: at 15:19

## 2020-05-26 RX ADMIN — GABAPENTIN 200 MG: 100 CAPSULE ORAL at 19:54

## 2020-05-26 RX ADMIN — LEVOTHYROXINE SODIUM 75 MCG: 75 TABLET ORAL at 06:21

## 2020-05-26 RX ADMIN — MELOXICAM 7.5 MG: 7.5 TABLET ORAL at 07:45

## 2020-05-26 RX ADMIN — ENOXAPARIN SODIUM 40 MG: 40 INJECTION SUBCUTANEOUS at 07:44

## 2020-05-26 RX ADMIN — ACETAMINOPHEN 650 MG: 325 TABLET ORAL at 17:49

## 2020-05-26 RX ADMIN — PIPERACILLIN AND TAZOBACTAM 3.38 G: 3; .375 INJECTION, POWDER, LYOPHILIZED, FOR SOLUTION INTRAVENOUS at 06:21

## 2020-05-26 RX ADMIN — ACETAMINOPHEN 650 MG: 325 TABLET ORAL at 12:05

## 2020-05-26 RX ADMIN — OXYCODONE HYDROCHLORIDE 10 MG: 5 TABLET ORAL at 07:45

## 2020-05-26 RX ADMIN — ACETAMINOPHEN 650 MG: 325 TABLET ORAL at 07:45

## 2020-05-26 RX ADMIN — GABAPENTIN 200 MG: 100 CAPSULE ORAL at 07:59

## 2020-05-26 RX ADMIN — FAMOTIDINE 20 MG: 20 TABLET ORAL at 07:45

## 2020-05-26 RX ADMIN — ACETAMINOPHEN 650 MG: 325 TABLET ORAL at 19:54

## 2020-05-26 RX ADMIN — PIPERACILLIN AND TAZOBACTAM 3.38 G: 3; .375 INJECTION, POWDER, LYOPHILIZED, FOR SOLUTION INTRAVENOUS at 15:19

## 2020-05-26 RX ADMIN — OXYCODONE HYDROCHLORIDE 10 MG: 5 TABLET ORAL at 03:21

## 2020-05-26 ASSESSMENT — PAIN DESCRIPTION - ORIENTATION: ORIENTATION: LEFT

## 2020-05-26 ASSESSMENT — PAIN DESCRIPTION - LOCATION: LOCATION: KNEE

## 2020-05-26 ASSESSMENT — PAIN DESCRIPTION - PAIN TYPE: TYPE: ACUTE PAIN

## 2020-05-26 ASSESSMENT — PAIN DESCRIPTION - PROGRESSION
CLINICAL_PROGRESSION: GRADUALLY WORSENING

## 2020-05-26 ASSESSMENT — PAIN DESCRIPTION - DESCRIPTORS: DESCRIPTORS: ACHING

## 2020-05-26 ASSESSMENT — PAIN SCALES - GENERAL
PAINLEVEL_OUTOF10: 8
PAINLEVEL_OUTOF10: 7
PAINLEVEL_OUTOF10: 8

## 2020-05-26 NOTE — PROGRESS NOTES
Occupational Therapy  Facility/Department: [facility] MED SURG UNIT  Daily Treatment Note  NAME: Flaco Norton  : 1960  MRN: [id number]    Date of Service: 2020    Discharge Recommendations:  Continue to assess pending progress       Assessment   Performance deficits / Impairments: Decreased functional mobility ; Decreased ADL status; Decreased ROM; Decreased strength;Decreased endurance;Decreased sensation;Decreased balance  Assessment: Pt demod good mariano awareness , 1 LOB at sink standing , pt stated he is alittle fatigued and is aware of need for CGA for balance in standing dynamic   Treatment Diagnosis: cellulitis  Prognosis: Good  REQUIRES OT FOLLOW UP: Yes  Activity Tolerance  Activity Tolerance: Patient Tolerated treatment well;Patient limited by fatigue         Patient Diagnosis(es): There were no encounter diagnoses. has a past medical history of Hepatitis B, Hepatitis C, Hypothyroid, and Other specified disorders of brain. has no past surgical history on file. Restrictions  Restrictions/Precautions  Restrictions/Precautions: Fall Risk  Required Braces or Orthoses?: No  Subjective   General  Chart Reviewed: Yes  Patient assessed for rehabilitation services?: Yes  Family / Caregiver Present: No  Referring Practitioner: Shelton Ward MD  Subjective  Subjective: pt is cooperative   Vital Signs  Patient Currently in Pain: Denies  Patient Observation  Observations: pt demos good awareness of tubing mgmt.  needs   Orientation     Objective    ADL  Feeding: Modified independent   Grooming: Setup;Stand by assistance  UE Bathing: Stand by assistance  LE Bathing: Minimal assistance;Contact guard assistance(sink and FWW for balance ,seated sink bath,pt can reach feet)  UE Dressing: Minimal assistance  LE Dressing: Setup;Contact guard assistance  Toileting: Contact guard assistance;Minimal assistance  Additional Comments: pt standsat sink for rear hygiene 1 LOB needed Corina for correct ,due to fatigue ,pt is

## 2020-05-26 NOTE — PROGRESS NOTES
Physical Therapy  Facility/Department: [facility] MED SURG UNIT  Daily Treatment Note -PM tx   NAME: John Granados  : 1960  MRN: [id number]    Date of Service: 2020    Discharge Recommendations:  Home with assist PRN, Home with Home health PT        Assessment   Body structures, Functions, Activity limitations: Decreased endurance;Decreased functional mobility ; Decreased strength; Increased pain  Assessment: Pt was agreeable to work with PT. Pt ambulated in the hallway needing vqs to stay within the walker to decreased his left knee pain with gait. Pt took a short rest break after gait and then performed seated ther ex. Pt needed vqs for correct technique with HEP to keep his knee straight during ther ex. Pt reports left knee pain increases  Treatment Diagnosis: weakness with decreased fucn tinoal mobiltiy independence due to cellulits chest wall  Prognosis: Good  REQUIRES PT FOLLOW UP: Yes     Patient Diagnosis(es): Cellulitis of chest wall     has a past medical history of Hepatitis B, Hepatitis C, Hypothyroid, and Other specified disorders of brain. has no past surgical history on file. Restrictions  Restrictions/Precautions  Restrictions/Precautions: Fall Risk  Required Braces or Orthoses?: No  Subjective   General  Chart Reviewed:  Yes  Additional Pertinent Hx: Trach for at least 5 years, now iwth cellulitis of chest wall/pain and  need for antibiotics for 6 weeks  Family / Caregiver Present: No  Referring Practitioner: Dr Fred Avina: Pt reports left knee pain as 7/10  Pain Screening  Patient Currently in Pain: Yes  Pain Assessment  Pain Assessment: 0-10  Pain Level: 7  Patient's Stated Pain Goal: No pain  Pain Type: Acute pain  Pain Location: Knee  Pain Orientation: Left  Pain Descriptors: Aching  Vital Signs  Patient Currently in Pain: Yes  Pre Treatment Pain Screening  Pain at present: 7  Scale Used: Numeric Score  Intervention List: Patient able to continue with treatment

## 2020-05-27 ENCOUNTER — HOSPITAL ENCOUNTER (OUTPATIENT)
Dept: INTERVENTIONAL RADIOLOGY/VASCULAR | Age: 60
Discharge: HOME | End: 2020-05-29
Payer: MEDICARE

## 2020-05-27 LAB
APTT: 29.5 SEC (ref 24.4–36.8)
INR BLD: 1
PROTHROMBIN TIME: 13.5 SEC (ref 12.3–14.9)

## 2020-05-27 PROCEDURE — 36415 COLL VENOUS BLD VENIPUNCTURE: CPT

## 2020-05-27 PROCEDURE — 85730 THROMBOPLASTIN TIME PARTIAL: CPT

## 2020-05-27 PROCEDURE — 2500000003 HC RX 250 WO HCPCS: Performed by: INTERNAL MEDICINE

## 2020-05-27 PROCEDURE — 6370000000 HC RX 637 (ALT 250 FOR IP): Performed by: ANESTHESIOLOGY

## 2020-05-27 PROCEDURE — 6360000002 HC RX W HCPCS: Performed by: INTERNAL MEDICINE

## 2020-05-27 PROCEDURE — 2580000003 HC RX 258: Performed by: INTERNAL MEDICINE

## 2020-05-27 PROCEDURE — 6370000000 HC RX 637 (ALT 250 FOR IP): Performed by: INTERNAL MEDICINE

## 2020-05-27 PROCEDURE — 1200000002 HC SEMI PRIVATE SWING BED

## 2020-05-27 PROCEDURE — 36573 INSJ PICC RS&I 5 YR+: CPT

## 2020-05-27 PROCEDURE — 85610 PROTHROMBIN TIME: CPT

## 2020-05-27 PROCEDURE — 99232 SBSQ HOSP IP/OBS MODERATE 35: CPT | Performed by: INTERNAL MEDICINE

## 2020-05-27 PROCEDURE — 2709999900 IR PICC WO SQ PORT/PUMP > 5 YEARS

## 2020-05-27 RX ORDER — SODIUM CHLORIDE 0.9 % (FLUSH) 0.9 %
10 SYRINGE (ML) INJECTION EVERY 12 HOURS SCHEDULED
Status: DISCONTINUED | OUTPATIENT
Start: 2020-05-27 | End: 2020-05-30 | Stop reason: HOSPADM

## 2020-05-27 RX ORDER — LIDOCAINE HYDROCHLORIDE 20 MG/ML
5 INJECTION, SOLUTION INFILTRATION; PERINEURAL ONCE
Status: COMPLETED | OUTPATIENT
Start: 2020-05-27 | End: 2020-05-27

## 2020-05-27 RX ORDER — SODIUM CHLORIDE 0.9 % (FLUSH) 0.9 %
10 SYRINGE (ML) INJECTION PRN
Status: DISCONTINUED | OUTPATIENT
Start: 2020-05-27 | End: 2020-05-30 | Stop reason: HOSPADM

## 2020-05-27 RX ORDER — SODIUM CHLORIDE 9 MG/ML
250 INJECTION, SOLUTION INTRAVENOUS ONCE
Status: COMPLETED | OUTPATIENT
Start: 2020-05-27 | End: 2020-05-27

## 2020-05-27 RX ORDER — FUROSEMIDE 20 MG/1
20 TABLET ORAL DAILY
Status: DISCONTINUED | OUTPATIENT
Start: 2020-05-27 | End: 2020-06-07

## 2020-05-27 RX ADMIN — GABAPENTIN 200 MG: 100 CAPSULE ORAL at 08:15

## 2020-05-27 RX ADMIN — FAMOTIDINE 20 MG: 20 TABLET ORAL at 08:16

## 2020-05-27 RX ADMIN — MELOXICAM 7.5 MG: 7.5 TABLET ORAL at 08:15

## 2020-05-27 RX ADMIN — GABAPENTIN 200 MG: 100 CAPSULE ORAL at 14:28

## 2020-05-27 RX ADMIN — PIPERACILLIN AND TAZOBACTAM 3.38 G: 3; .375 INJECTION, POWDER, LYOPHILIZED, FOR SOLUTION INTRAVENOUS at 14:28

## 2020-05-27 RX ADMIN — OXYCODONE HYDROCHLORIDE 10 MG: 5 TABLET ORAL at 14:34

## 2020-05-27 RX ADMIN — LIDOCAINE HYDROCHLORIDE 5 ML: 20 INJECTION, SOLUTION INFILTRATION; PERINEURAL at 13:17

## 2020-05-27 RX ADMIN — SODIUM CHLORIDE 250 ML: 9 INJECTION, SOLUTION INTRAVENOUS at 13:17

## 2020-05-27 RX ADMIN — OXYCODONE HYDROCHLORIDE 10 MG: 5 TABLET ORAL at 23:31

## 2020-05-27 RX ADMIN — OXYCODONE HYDROCHLORIDE 10 MG: 5 TABLET ORAL at 10:35

## 2020-05-27 RX ADMIN — PIPERACILLIN AND TAZOBACTAM 3.38 G: 3; .375 INJECTION, POWDER, LYOPHILIZED, FOR SOLUTION INTRAVENOUS at 06:35

## 2020-05-27 RX ADMIN — OXYCODONE HYDROCHLORIDE 10 MG: 5 TABLET ORAL at 18:46

## 2020-05-27 RX ADMIN — OXYCODONE HYDROCHLORIDE 10 MG: 5 TABLET ORAL at 06:35

## 2020-05-27 RX ADMIN — PIPERACILLIN AND TAZOBACTAM 3.38 G: 3; .375 INJECTION, POWDER, LYOPHILIZED, FOR SOLUTION INTRAVENOUS at 23:42

## 2020-05-27 RX ADMIN — PIPERACILLIN AND TAZOBACTAM 3.38 G: 3; .375 INJECTION, POWDER, LYOPHILIZED, FOR SOLUTION INTRAVENOUS at 00:02

## 2020-05-27 RX ADMIN — Medication 10 ML: at 08:16

## 2020-05-27 RX ADMIN — ACETAMINOPHEN 650 MG: 325 TABLET ORAL at 08:15

## 2020-05-27 RX ADMIN — FUROSEMIDE 20 MG: 20 TABLET ORAL at 14:28

## 2020-05-27 RX ADMIN — GABAPENTIN 200 MG: 100 CAPSULE ORAL at 20:00

## 2020-05-27 RX ADMIN — ACETAMINOPHEN 650 MG: 325 TABLET ORAL at 14:34

## 2020-05-27 RX ADMIN — ACETAMINOPHEN 650 MG: 325 TABLET ORAL at 20:01

## 2020-05-27 RX ADMIN — LEVOTHYROXINE SODIUM 75 MCG: 75 TABLET ORAL at 06:35

## 2020-05-27 RX ADMIN — Medication 10 ML: at 20:01

## 2020-05-27 RX ADMIN — OXYCODONE HYDROCHLORIDE 10 MG: 5 TABLET ORAL at 00:02

## 2020-05-27 RX ADMIN — ACETAMINOPHEN 650 MG: 325 TABLET ORAL at 17:36

## 2020-05-27 RX ADMIN — FAMOTIDINE 20 MG: 20 TABLET ORAL at 20:01

## 2020-05-27 ASSESSMENT — PAIN SCALES - GENERAL
PAINLEVEL_OUTOF10: 5
PAINLEVEL_OUTOF10: 7
PAINLEVEL_OUTOF10: 8
PAINLEVEL_OUTOF10: 6
PAINLEVEL_OUTOF10: 8
PAINLEVEL_OUTOF10: 5
PAINLEVEL_OUTOF10: 6
PAINLEVEL_OUTOF10: 7
PAINLEVEL_OUTOF10: 8
PAINLEVEL_OUTOF10: 3
PAINLEVEL_OUTOF10: 0

## 2020-05-27 ASSESSMENT — PAIN DESCRIPTION - PROGRESSION
CLINICAL_PROGRESSION: GRADUALLY WORSENING
CLINICAL_PROGRESSION: GRADUALLY IMPROVING

## 2020-05-27 ASSESSMENT — PAIN DESCRIPTION - DESCRIPTORS
DESCRIPTORS: SORE;ACHING
DESCRIPTORS: SORE

## 2020-05-27 ASSESSMENT — PAIN DESCRIPTION - LOCATION
LOCATION: THROAT
LOCATION: SHOULDER

## 2020-05-27 ASSESSMENT — PAIN DESCRIPTION - ONSET
ONSET: ON-GOING
ONSET: ON-GOING

## 2020-05-27 ASSESSMENT — PAIN DESCRIPTION - PAIN TYPE
TYPE: ACUTE PAIN
TYPE: ACUTE PAIN

## 2020-05-27 ASSESSMENT — PAIN DESCRIPTION - FREQUENCY
FREQUENCY: CONTINUOUS
FREQUENCY: CONTINUOUS

## 2020-05-27 ASSESSMENT — PAIN - FUNCTIONAL ASSESSMENT
PAIN_FUNCTIONAL_ASSESSMENT: PREVENTS OR INTERFERES SOME ACTIVE ACTIVITIES AND ADLS
PAIN_FUNCTIONAL_ASSESSMENT: PREVENTS OR INTERFERES SOME ACTIVE ACTIVITIES AND ADLS

## 2020-05-27 ASSESSMENT — PAIN DESCRIPTION - ORIENTATION
ORIENTATION: LEFT
ORIENTATION: LEFT

## 2020-05-27 NOTE — PROGRESS NOTES
Pt out getting PICC line. Unavailable for weekly Interdisciplinary Care Conference this date.     Vero Edwarsd, [id number]

## 2020-05-27 NOTE — PROGRESS NOTES
Coloration: Skin is not jaundiced. Findings: Erythema present. No rash. Neurological:      Mental Status: He is alert and oriented to person, place, and time. Psychiatric:         Mood and Affect: Mood normal.         Behavior: Behavior normal.       Remarkable decrease in L chest erythema swelling and tenderness    DATA:    Lab Results   Component Value Date    WBC 6.1 05/25/2020    HGB 11.7 (L) 05/25/2020    HCT 35.7 (L) 05/25/2020    MCV 94.6 05/25/2020     (H) 05/25/2020     Lab Results   Component Value Date    CREATININE 0.46 (L) 05/25/2020    BUN 5 (L) 05/25/2020     05/25/2020    K 4.0 05/25/2020     05/25/2020    CO2 27 05/25/2020       Hepatic Function Panel:  Lab Results   Component Value Date    ALKPHOS 50 05/15/2020    ALT 34 05/15/2020    AST 28 05/15/2020    PROT 6.3 05/15/2020    BILITOT 0.4 05/15/2020    LABALBU 2.8 05/15/2020       Microbiology:   Recent Labs     05/25/20  0900   BC No Growth to date. Any change in status will be called. Recent Labs     05/25/20  0900   BLOODCULT2 No Growth to date. Any change in status will be called.            IMPRESSION:    · L chest wall cellulitis with abscess and probable septic arthritis of L sternoclavicular joint  · H/O laryngeal cancer with Trach  · Strep bacteremia    Patient Active Problem List   Diagnosis    Cellulitis    Frequent falls    Weakness    Laryngeal carcinoma (HCC)    Gastroesophageal reflux    Chest wall pain    Neck pain on left side    Septic arthritis of AC joint (HCC)    Thigh pain, musculoskeletal, left    Neuropathic pain of thigh, left    Streptococcal bacteremia    Therapeutic opioid-induced constipation (OIC)       PLAN:  · IV Zosyn for 3 more weeks  · F/U CBC BMP CRP    Discussed with patient and Fabio Rowe MD

## 2020-05-27 NOTE — PROGRESS NOTES
Chart reviewed. Patient continues to receive skilled therapies for but not limited to IV antibiotics. Multidisciplinary team attempted to meet with patient earlier for care conference however patient was out getting PICC Line. This  met with patient upon returning to [facility]. Patient reports plan continue to remain at [facility] for IV antibiotics and then meet with hospice for possible services upon DC. Patient reports no questions and or concerns at this time. SS to continue to follow and assist with coordinating hospice visit closer to DC.

## 2020-05-27 NOTE — PROGRESS NOTES
Cranial nerves: II-XII intact, grossly non-focal.  Psychiatric: Alert and oriented, thought content appropriate, normal insight  Capillary Refill: Brisk,< 3 seconds   Peripheral Pulses: +2 palpable, equal bilaterally       Labs:   Recent Labs     05/25/20  0449   WBC 6.1   HGB 11.7*   HCT 35.7*   *     Recent Labs     05/25/20  0450      K 4.0      CO2 27   BUN 5*   CREATININE 0.46*   CALCIUM 9.3     No results for input(s): AST, ALT, BILIDIR, BILITOT, ALKPHOS in the last 72 hours. No results for input(s): INR in the last 72 hours. No results for input(s): [place] in the last 72 hours.     Urinalysis:      Lab Results   Component Value Date    NITRU Negative 05/14/2020    WBCUA 0-2 05/14/2020    BACTERIA Negative 05/14/2020    RBCUA 0-2 05/14/2020    BLOODU Negative 05/14/2020    SPECGRAV 1.025 05/14/2020    GLUCOSEU Negative 05/14/2020       Radiology:  IR Picc Equal or Greater Than 5 Years    (Results Pending)           Assessment/Plan:    Active Hospital Problems    Diagnosis Date Noted    Therapeutic opioid-induced constipation (OIC) [K59.03, T40.2X5A] 05/23/2020    Streptococcal bacteremia [R78.81, B95.5]     Chest wall pain [R07.89] 05/20/2020    Neck pain on left side [M54.2] 05/20/2020    Septic arthritis of AC joint ([facility] [address] [M00.9] 05/20/2020    Thigh pain, musculoskeletal, left [M79.652] 05/20/2020    Neuropathic pain of thigh, left [M79.2] 05/20/2020    Laryngeal carcinoma ([address] [C32.9] 05/14/2020    Cellulitis [L03.90] 05/14/2020    Frequent falls [R29.6] 05/14/2020    Weakness [R53.1] 05/14/2020    Gastroesophageal reflux [K21.9] 05/14/2020         DVT Prophylaxis: lovenox  Diet: Dietary Nutrition Supplements: Other Oral Supplement (see comment)  DIET GENERAL; Dysphagia Soft and Bite-Sized  Dietary Nutrition Supplements: Standard High Calorie Oral Supplement  Code Status: DNR-CC    PT/OT Eval Status: done    Dispo - Bacteremia, septic sternoclavicular joint arthritis- atbs per ID recommendations changed to zosyn, repeat BC negative- stable for PICC placement   Pain due to above- per pain management  History of laryngeal CA- post tracheostomy insertion   Prolong hospital stay, weakness- rehab orders initiated  Constipation-resolved   Hyponatremia on admission at [facility]- resolved, follow up with BMP   AM  Medically stable for skilled admission at [facility]      Electronically signed by Leonie Bedolla MD on 5/27/2020 at 7:37 AM

## 2020-05-27 NOTE — PROGRESS NOTES
Occupational Therapy  Facility/Department: [facility] MED SURG UNIT  Daily Treatment Note  NAME: Christa Sandoval  : 1960  MRN: [id number]    Date of Service: 2020    Pt not seen for OT this date- out getting a PICC line.                  Therapy Time   Individual Concurrent Group Co-treatment   Time In  0         Time Out  0         Minutes  [place], [place]

## 2020-05-27 NOTE — FLOWSHEET NOTE
Pt brought to CT HR via cart post PICC placement. Pt resting well watching TV, call light in reach. Hunter Laws 1 care here for pt, transferred pt to cart. No complaints voiced. Report called to Clint at [place].

## 2020-05-28 PROCEDURE — 6360000002 HC RX W HCPCS: Performed by: INTERNAL MEDICINE

## 2020-05-28 PROCEDURE — 1200000002 HC SEMI PRIVATE SWING BED

## 2020-05-28 PROCEDURE — 97110 THERAPEUTIC EXERCISES: CPT

## 2020-05-28 PROCEDURE — 6370000000 HC RX 637 (ALT 250 FOR IP): Performed by: INTERNAL MEDICINE

## 2020-05-28 PROCEDURE — 2580000003 HC RX 258: Performed by: INTERNAL MEDICINE

## 2020-05-28 PROCEDURE — 97530 THERAPEUTIC ACTIVITIES: CPT

## 2020-05-28 PROCEDURE — 97116 GAIT TRAINING THERAPY: CPT

## 2020-05-28 PROCEDURE — 6370000000 HC RX 637 (ALT 250 FOR IP): Performed by: ANESTHESIOLOGY

## 2020-05-28 RX ADMIN — PIPERACILLIN AND TAZOBACTAM 3.38 G: 3; .375 INJECTION, POWDER, LYOPHILIZED, FOR SOLUTION INTRAVENOUS at 14:34

## 2020-05-28 RX ADMIN — ACETAMINOPHEN 650 MG: 325 TABLET ORAL at 11:47

## 2020-05-28 RX ADMIN — FUROSEMIDE 20 MG: 20 TABLET ORAL at 08:23

## 2020-05-28 RX ADMIN — Medication 10 ML: at 19:45

## 2020-05-28 RX ADMIN — FAMOTIDINE 20 MG: 20 TABLET ORAL at 08:23

## 2020-05-28 RX ADMIN — FAMOTIDINE 20 MG: 20 TABLET ORAL at 19:42

## 2020-05-28 RX ADMIN — OXYCODONE HYDROCHLORIDE 10 MG: 5 TABLET ORAL at 11:46

## 2020-05-28 RX ADMIN — OXYCODONE HYDROCHLORIDE 10 MG: 5 TABLET ORAL at 03:26

## 2020-05-28 RX ADMIN — OXYCODONE HYDROCHLORIDE 10 MG: 5 TABLET ORAL at 07:47

## 2020-05-28 RX ADMIN — SENNOSIDES AND DOCUSATE SODIUM 2 TABLET: 8.6; 5 TABLET ORAL at 08:24

## 2020-05-28 RX ADMIN — OXYCODONE HYDROCHLORIDE 10 MG: 5 TABLET ORAL at 19:43

## 2020-05-28 RX ADMIN — OXYCODONE HYDROCHLORIDE 10 MG: 5 TABLET ORAL at 23:55

## 2020-05-28 RX ADMIN — Medication 10 ML: at 08:22

## 2020-05-28 RX ADMIN — PIPERACILLIN AND TAZOBACTAM 3.38 G: 3; .375 INJECTION, POWDER, LYOPHILIZED, FOR SOLUTION INTRAVENOUS at 06:16

## 2020-05-28 RX ADMIN — ACETAMINOPHEN 650 MG: 325 TABLET ORAL at 17:37

## 2020-05-28 RX ADMIN — ENOXAPARIN SODIUM 40 MG: 40 INJECTION SUBCUTANEOUS at 08:23

## 2020-05-28 RX ADMIN — GABAPENTIN 200 MG: 100 CAPSULE ORAL at 19:43

## 2020-05-28 RX ADMIN — GABAPENTIN 200 MG: 100 CAPSULE ORAL at 14:35

## 2020-05-28 RX ADMIN — LEVOTHYROXINE SODIUM 75 MCG: 75 TABLET ORAL at 06:16

## 2020-05-28 RX ADMIN — OXYCODONE HYDROCHLORIDE 10 MG: 5 TABLET ORAL at 15:53

## 2020-05-28 RX ADMIN — ACETAMINOPHEN 650 MG: 325 TABLET ORAL at 08:22

## 2020-05-28 RX ADMIN — PIPERACILLIN AND TAZOBACTAM 3.38 G: 3; .375 INJECTION, POWDER, LYOPHILIZED, FOR SOLUTION INTRAVENOUS at 23:55

## 2020-05-28 RX ADMIN — MELOXICAM 7.5 MG: 7.5 TABLET ORAL at 08:23

## 2020-05-28 RX ADMIN — ACETAMINOPHEN 650 MG: 325 TABLET ORAL at 19:42

## 2020-05-28 RX ADMIN — GABAPENTIN 200 MG: 100 CAPSULE ORAL at 08:22

## 2020-05-28 ASSESSMENT — PAIN DESCRIPTION - ORIENTATION
ORIENTATION: LEFT

## 2020-05-28 ASSESSMENT — PAIN DESCRIPTION - PROGRESSION
CLINICAL_PROGRESSION: GRADUALLY IMPROVING
CLINICAL_PROGRESSION: GRADUALLY WORSENING
CLINICAL_PROGRESSION: GRADUALLY WORSENING
CLINICAL_PROGRESSION: GRADUALLY IMPROVING
CLINICAL_PROGRESSION: GRADUALLY IMPROVING
CLINICAL_PROGRESSION: GRADUALLY WORSENING
CLINICAL_PROGRESSION: GRADUALLY IMPROVING
CLINICAL_PROGRESSION: NOT CHANGED

## 2020-05-28 ASSESSMENT — PAIN DESCRIPTION - FREQUENCY
FREQUENCY: CONTINUOUS

## 2020-05-28 ASSESSMENT — PAIN DESCRIPTION - LOCATION
LOCATION: ARM
LOCATION: SHOULDER
LOCATION: ARM
LOCATION: ARM;LEG
LOCATION: SHOULDER
LOCATION: ARM
LOCATION: ARM;LEG
LOCATION: THROAT

## 2020-05-28 ASSESSMENT — PAIN DESCRIPTION - PAIN TYPE
TYPE: ACUTE PAIN
TYPE: CHRONIC PAIN
TYPE: CHRONIC PAIN
TYPE: ACUTE PAIN
TYPE: CHRONIC PAIN
TYPE: ACUTE PAIN

## 2020-05-28 ASSESSMENT — PAIN DESCRIPTION - DESCRIPTORS
DESCRIPTORS: ACHING;SORE
DESCRIPTORS: ACHING;SORE
DESCRIPTORS: SORE
DESCRIPTORS: ACHING
DESCRIPTORS: ACHING;SORE

## 2020-05-28 ASSESSMENT — PAIN DESCRIPTION - ONSET
ONSET: ON-GOING

## 2020-05-28 ASSESSMENT — PAIN SCALES - GENERAL
PAINLEVEL_OUTOF10: 6
PAINLEVEL_OUTOF10: 6
PAINLEVEL_OUTOF10: 7
PAINLEVEL_OUTOF10: 1
PAINLEVEL_OUTOF10: 7
PAINLEVEL_OUTOF10: 7
PAINLEVEL_OUTOF10: 2
PAINLEVEL_OUTOF10: 2
PAINLEVEL_OUTOF10: 7
PAINLEVEL_OUTOF10: 7
PAINLEVEL_OUTOF10: 6

## 2020-05-28 NOTE — PROGRESS NOTES
Recommendations: Strengthening, ROM, Balance Training, Functional Mobility Training, Endurance Training, Pain Management, Safety Education & Training, Patient/Caregiver Education & Training, Equipment Evaluation, Education, & procurement, Positioning, Home Management Training, Self-Care / ADL  Plan Comment: Pt. requires skilled OT intervention services needed to increase balance, strength, and endurance needed to increase safety and independence with ADLs, transfers, and functional mobility. Goals  Short term goals  Time Frame for Short term goals: 10 days to 3 weeks or length of IV  Short term goal 1: same as LTG  Short term goal 2: same as LTG  Short term goal 3: same as LTG  Short term goal 4: same as LTG  Short term goal 5: same as LTG  Long term goals  Time Frame for Long term goals : 10 days to 3 weeks or length of IV  Long term goal 1: Pt. will complete UB/LB bathing at mod I level. Long term goal 2: Pt. will complete UB/LB dressing at mod I level. Long term goal 3: Pt. will complete functional transfers at mod I level. Long term goal 4: Pt. will demonstrate good dynamic standing balance during ADL completion with mod I.  Long term goal 5: Pt. will complete BUE strengthening/ROM exercises needed for transfers and ADLs.   Patient Goals   Patient goals : to get better       Therapy Time   Individual Concurrent Group Co-treatment   Time In  11:45         Time Out  12:00         Minutes  [address] [place] [address]

## 2020-05-28 NOTE — PROGRESS NOTES
Physical Therapy  Facility/Department: [facility] MED SURG UNIT  Daily Treatment Note -PM tx   NAME: Thelma Emmanuel  : 1960  MRN: [id number]    Date of Service: 2020    Discharge Recommendations:  Home with assist PRN, Home with Home health PT        Assessment   Body structures, Functions, Activity limitations: Decreased endurance;Decreased functional mobility ; Decreased strength; Increased pain  Assessment: Pt in recliner and agreeable to doing ther ex and gait. Pt wanted pain medicine as well so nsg notified. Pt was able to increase reps on some ther ex while kept the other execises same reps due to knee pain and fatigue. Pt was agreeable to gait after receiving his pain meds but ambulated a shorter distance due to an antalgic gait pattern. Pt is limited by his left knee and left shoulder pain. Treatment Diagnosis: weakness with decreased fucn tinoal mobiltiy independence due to cellulits chest wall  Prognosis: Good  REQUIRES PT FOLLOW UP: Yes     Patient Diagnosis(es): cellulitis left chest wall     has a past medical history of Hepatitis B, Hepatitis C, Hypothyroid, and Other specified disorders of brain. has no past surgical history on file. Restrictions  Restrictions/Precautions  Restrictions/Precautions: Fall Risk  Required Braces or Orthoses?: No  Subjective   General  Chart Reviewed:  Yes  Additional Pertinent Hx: Trach for at least 5 years, now iwth cellulitis of chest wall/pain and  need for antibiotics for 6 weeks  Family / Caregiver Present: No  Referring Practitioner: Dr Miller Ronald: Pt reports left shoulder and left knee pain   Pain Screening  Patient Currently in Pain: Yes  Pain Assessment  Pain Assessment: 0-10  Pain Level: 6  Pain Type: Chronic pain  Pain Location: Arm;Leg  Pain Orientation: Left  Pain Descriptors: Aching  Vital Signs  Patient Currently in Pain: Yes  Pre Treatment Pain Screening  Pain at present: 6  Scale Used: Numeric Score    Orientation     Cognition Objective      Transfers  Sit to Stand: Contact guard assistance  Stand to sit: Stand by assistance(with vqs to use his hands on arm rest )  Comment: Pt stood in front of chair for 1-2 minutues to assess his knee pain before ambulating. Ambulation  Ambulation?: Yes  Ambulation 1  Surface: level tile  Device: Rolling Walker  Assistance: Contact guard assistance  Quality of Gait: Pt ambulated with decreased step length L LE with a step to gait pattern with an antalgic gait pattern due to left knee pain. Gait Deviations: Decreased step length  Distance: 95' x1   Comments: Pt needed vqs to bear weight through his arms to take pressure off of his left leg. Pt also needed cues to stay within the walker. Stairs/Curb  Stairs?: No        Exercises  Straight Leg Raise: x 10  B LE while sitting edge of chair   Hip Flexion: x 25 B LE   Hip Abduction: x15 BLE   Knee Long Arc Quad: x 25 BLE                        G-Code     OutComes Score                                                     AM-PAC Score             Goals  Short term goals  Time Frame for Short term goals: 1-2 weeks  Short term goal 1: transfer sit to stand with SBA and <1-2 vc for safety  Short term goal 2: bed mobility with <= SBA and increased time  Short term goal 3: amb >= 50ft before fatigued with CGA and ww  Short term goal 4: assess curb step wtih ww and Min A of 1  Long term goals  Time Frame for Long term goals : 6 weeks or length of IV needs   Long term goal 1: Modified independent bed mobility  Long term goal 2: Modified independent transfers with least AD  Long term goal 3: amb >=100ft with least AD modified independent  Long term goal 4: 1 curb step with AD and SBA to enter/exit home  Long term goal 5: indep with HEP to improve LE strength,   Patient Goals   Patient goals : \" Get stronger to go home. \" via mouthing words    Plan    Plan  Times per week: 5-7x week  Times per day: (QD to BiD)  Plan weeks: 6 weeks or legnth of IVs   Current Treatment Recommendations: Strengthening, Functional Mobility Training, Transfer Training, Balance Training, Endurance Training, Stair training, Gait Training, Pain Management, Patient/Caregiver Education & Training, Safety Education & Training, Manual Therapy - Joint Manipulation, Home Exercise Program, Equipment Evaluation, Education, & procurement, Positioning  Plan Comment: Cont. POC  Safety Devices  Type of devices:  All fall risk precautions in place, Call light within reach     Therapy Time   Individual Concurrent Group Co-treatment   Time In  3:25pm         Time Out  4:15pm          Minutes  50 min                  Clifton Astudillo, PT  License and [address] Number: [id number]

## 2020-05-29 PROCEDURE — 1200000002 HC SEMI PRIVATE SWING BED

## 2020-05-29 PROCEDURE — 6360000002 HC RX W HCPCS: Performed by: INTERNAL MEDICINE

## 2020-05-29 PROCEDURE — 6370000000 HC RX 637 (ALT 250 FOR IP): Performed by: INTERNAL MEDICINE

## 2020-05-29 PROCEDURE — 97116 GAIT TRAINING THERAPY: CPT

## 2020-05-29 PROCEDURE — 2580000003 HC RX 258: Performed by: INTERNAL MEDICINE

## 2020-05-29 PROCEDURE — 6370000000 HC RX 637 (ALT 250 FOR IP): Performed by: ANESTHESIOLOGY

## 2020-05-29 RX ADMIN — OXYCODONE HYDROCHLORIDE 10 MG: 5 TABLET ORAL at 03:51

## 2020-05-29 RX ADMIN — FAMOTIDINE 20 MG: 20 TABLET ORAL at 07:53

## 2020-05-29 RX ADMIN — MAGNESIUM HYDROXIDE 30 ML: 400 SUSPENSION ORAL at 08:57

## 2020-05-29 RX ADMIN — MELOXICAM 7.5 MG: 7.5 TABLET ORAL at 07:52

## 2020-05-29 RX ADMIN — OXYCODONE HYDROCHLORIDE 10 MG: 5 TABLET ORAL at 12:00

## 2020-05-29 RX ADMIN — METHYLNALTREXONE BROMIDE 12 MG: 12 INJECTION, SOLUTION SUBCUTANEOUS at 08:57

## 2020-05-29 RX ADMIN — ACETAMINOPHEN 650 MG: 325 TABLET ORAL at 12:00

## 2020-05-29 RX ADMIN — PIPERACILLIN AND TAZOBACTAM 3.38 G: 3; .375 INJECTION, POWDER, LYOPHILIZED, FOR SOLUTION INTRAVENOUS at 06:16

## 2020-05-29 RX ADMIN — ACETAMINOPHEN 650 MG: 325 TABLET ORAL at 20:03

## 2020-05-29 RX ADMIN — OXYCODONE HYDROCHLORIDE 10 MG: 5 TABLET ORAL at 20:04

## 2020-05-29 RX ADMIN — ACETAMINOPHEN 650 MG: 325 TABLET ORAL at 17:05

## 2020-05-29 RX ADMIN — GABAPENTIN 200 MG: 100 CAPSULE ORAL at 20:04

## 2020-05-29 RX ADMIN — GABAPENTIN 200 MG: 100 CAPSULE ORAL at 15:11

## 2020-05-29 RX ADMIN — PIPERACILLIN AND TAZOBACTAM 3.38 G: 3; .375 INJECTION, POWDER, LYOPHILIZED, FOR SOLUTION INTRAVENOUS at 15:11

## 2020-05-29 RX ADMIN — LEVOTHYROXINE SODIUM 75 MCG: 75 TABLET ORAL at 06:16

## 2020-05-29 RX ADMIN — FUROSEMIDE 20 MG: 20 TABLET ORAL at 07:53

## 2020-05-29 RX ADMIN — ENOXAPARIN SODIUM 40 MG: 40 INJECTION SUBCUTANEOUS at 07:52

## 2020-05-29 RX ADMIN — FAMOTIDINE 20 MG: 20 TABLET ORAL at 20:03

## 2020-05-29 RX ADMIN — OXYCODONE HYDROCHLORIDE 10 MG: 5 TABLET ORAL at 15:58

## 2020-05-29 RX ADMIN — Medication 10 ML: at 20:03

## 2020-05-29 RX ADMIN — OXYCODONE HYDROCHLORIDE 10 MG: 5 TABLET ORAL at 07:52

## 2020-05-29 RX ADMIN — ACETAMINOPHEN 650 MG: 325 TABLET ORAL at 07:52

## 2020-05-29 RX ADMIN — GABAPENTIN 200 MG: 100 CAPSULE ORAL at 07:52

## 2020-05-29 ASSESSMENT — PAIN DESCRIPTION - ONSET
ONSET: ON-GOING

## 2020-05-29 ASSESSMENT — PAIN SCALES - GENERAL
PAINLEVEL_OUTOF10: 4
PAINLEVEL_OUTOF10: 6
PAINLEVEL_OUTOF10: 7
PAINLEVEL_OUTOF10: 2
PAINLEVEL_OUTOF10: 2
PAINLEVEL_OUTOF10: 6
PAINLEVEL_OUTOF10: 7
PAINLEVEL_OUTOF10: 6
PAINLEVEL_OUTOF10: 7

## 2020-05-29 ASSESSMENT — PAIN DESCRIPTION - PROGRESSION
CLINICAL_PROGRESSION: GRADUALLY IMPROVING
CLINICAL_PROGRESSION: GRADUALLY IMPROVING
CLINICAL_PROGRESSION: NOT CHANGED
CLINICAL_PROGRESSION: GRADUALLY WORSENING
CLINICAL_PROGRESSION: GRADUALLY IMPROVING

## 2020-05-29 ASSESSMENT — PAIN - FUNCTIONAL ASSESSMENT
PAIN_FUNCTIONAL_ASSESSMENT: PREVENTS OR INTERFERES SOME ACTIVE ACTIVITIES AND ADLS

## 2020-05-29 ASSESSMENT — PAIN DESCRIPTION - LOCATION
LOCATION: ARM

## 2020-05-29 ASSESSMENT — PAIN DESCRIPTION - PAIN TYPE
TYPE: ACUTE PAIN

## 2020-05-29 ASSESSMENT — PAIN DESCRIPTION - FREQUENCY
FREQUENCY: CONTINUOUS

## 2020-05-29 ASSESSMENT — PAIN DESCRIPTION - DESCRIPTORS
DESCRIPTORS: ACHING;SORE

## 2020-05-29 ASSESSMENT — PAIN DESCRIPTION - ORIENTATION
ORIENTATION: LEFT
ORIENTATION: LEFT
ORIENTATION: LOWER
ORIENTATION: LEFT

## 2020-05-29 NOTE — PROGRESS NOTES
Physical Therapy  Facility/Department: [facility] MED SURG UNIT  Daily Treatment Note  NAME: Ashley Peña  : 1960  MRN: [id number]    Date of Service: 2020    Discharge Recommendations:  Home with assist PRN, Home with Home health PT        Assessment   Body structures, Functions, Activity limitations: Decreased endurance;Decreased functional mobility ; Decreased strength; Increased pain  Assessment: Pt ambulated steadily with WW this afternoon but had some antalgia when [address] on LLE. Pt able to perform steps this date non-reciprocally CGAx1 with VC's for sequencing and good follow through. Pt feeling very fatigued post. Pt able to ambulate back to room to sit up in recliner. Call light and chair alarm in place. Pt deferred LE ther ex due to fatigue. Treatment Diagnosis: weakness with decreased fucn tinoal mobiltiy independence due to cellulits chest wall  REQUIRES PT FOLLOW UP: Yes  Activity Tolerance  Activity Tolerance: Patient Tolerated treatment well     Patient Diagnosis(es): There were no encounter diagnoses. has a past medical history of Hepatitis B, Hepatitis C, Hypothyroid, and Other specified disorders of brain. has no past surgical history on file. Restrictions  Restrictions/Precautions  Restrictions/Precautions: Fall Risk  Required Braces or Orthoses?: No  Subjective   General  Chart Reviewed: Yes  Additional Pertinent Hx: Trach for at least 5 years, now iwth cellulitis of chest wall/pain and  need for antibiotics for 6 weeks  Family / Caregiver Present: No  Referring Practitioner: Dr Zan Lares  Subjective  Subjective: Pt sitting up in recliner and agreeable to therapy. Pt reports L shoulder pain 7/10 and L knee pain 9/10.    Pain Screening  Patient Currently in Pain: Yes  Pain Assessment  Pain Assessment: 0-10  Pain Level: (7/10 L shoulder, 9/10 L knee)  Vital Signs  Patient Currently in Pain: Yes       Orientation     Cognition      Objective      Transfers  Sit to Stand: Stand by assistance  Stand to sit: Stand by assistance  Ambulation  Ambulation?: Yes  Ambulation 1  Surface: level tile  Device: Rolling Walker  Assistance: Contact guard assistance  Quality of Gait: dec step length, some antalgia when WB'ing LLE, slow and steady pacing  Distance: 70' x 2 with seated RB's between trials   Stairs/Curb  Stairs?: Yes  Stairs  # Steps : 5  Stairs Height: (2 6'' and 3 4'' )  Rails: Bilateral  Assistance: Contact guard assistance  Comment: assist to manage catheter. VC's for non-reciprocal ascending/descending with good follow through. G-Code     OutComes Score                                                     AM-PAC Score             Goals  Short term goals  Time Frame for Short term goals: 1-2 weeks  Short term goal 1: transfer sit to stand with SBA and <1-2 vc for safety  Short term goal 2: bed mobility with <= SBA and increased time  Short term goal 3: amb >= 50ft before fatigued with CGA and ww  Short term goal 4: assess curb step wtih ww and Min A of 1  Long term goals  Time Frame for Long term goals : 6 weeks or length of IV needs   Long term goal 1: Modified independent bed mobility  Long term goal 2: Modified independent transfers with least AD  Long term goal 3: amb >=100ft with least AD modified independent  Long term goal 4: 1 curb step with AD and SBA to enter/exit home  Long term goal 5: indep with HEP to improve LE strength,   Patient Goals   Patient goals : \" Get stronger to go home. \" via mouthing words    Plan    Plan  Times per week: 5-7x week  Times per day: (1-2)  Plan weeks: 6 weeks or legnth of IVs   Current Treatment Recommendations: Strengthening, Functional Mobility Training, Transfer Training, Balance Training, Endurance Training, Stair training, Gait Training, Pain Management, Patient/Caregiver Education & Training, Safety Education & Training, Manual Therapy - Joint Manipulation, Home Exercise Program, Equipment Evaluation, Education, & procurement, Positioning  Plan Comment: Cont. POC  Safety Devices  Type of devices:  All fall risk precautions in place, Call light within reach     Therapy Time   Individual Concurrent Group Co-treatment   Time In  [address]         Time Out  1350         Minutes  [address] Nidia Manning PTA  License and [address] Number: [id number]

## 2020-05-29 NOTE — PROGRESS NOTES
lesions. Neurologic:  Neurovascularly intact without any focal sensory/motor deficits. Cranial nerves: II-XII intact, grossly non-focal.  Psychiatric: Alert and oriented, thought content appropriate, normal insight  Capillary Refill: Brisk,< 3 seconds   Peripheral Pulses: +2 palpable, equal bilaterally       Labs:   No results for input(s): WBC, HGB, HCT, PLT in the last 72 hours. No results for input(s): NA, K, CL, CO2, BUN, CREATININE, CALCIUM, PHOS in the last 72 hours. Invalid input(s): MAGNES  No results for input(s): AST, ALT, BILIDIR, BILITOT, ALKPHOS in the last 72 hours. Recent Labs     05/27/20  0809   INR 1.0     No results for input(s): Perry Charis in the last 72 hours.     Urinalysis:      Lab Results   Component Value Date    NITRU Negative 05/14/2020    WBCUA 0-2 05/14/2020    BACTERIA Negative 05/14/2020    RBCUA 0-2 05/14/2020    BLOODU Negative 05/14/2020    SPECGRAV 1.025 05/14/2020    GLUCOSEU Negative 05/14/2020       Radiology:  IR Picc Equal or Greater Than 5 Years   Final Result              Assessment/Plan:    Active Hospital Problems    Diagnosis Date Noted    Therapeutic opioid-induced constipation (OIC) [K59.03, T40.2X5A] 05/23/2020    Streptococcal bacteremia [R78.81, B95.5]     Chest wall pain [R07.89] 05/20/2020    Neck pain on left side [M54.2] 05/20/2020    Septic arthritis of AC joint ([facility] [address] [M00.9] 05/20/2020    Thigh pain, musculoskeletal, left [M79.652] 05/20/2020    Neuropathic pain of thigh, left [M79.2] 05/20/2020    Laryngeal carcinoma ([address] [C32.9] 05/14/2020    Cellulitis [L03.90] 05/14/2020    Frequent falls [R29.6] 05/14/2020    Weakness [R53.1] 05/14/2020    Gastroesophageal reflux [K21.9] 05/14/2020         DVT Prophylaxis: lovenox  Diet: Dietary Nutrition Supplements: Other Oral Supplement (see comment)  DIET GENERAL; Dysphagia Soft and Bite-Sized  Dietary Nutrition Supplements: Standard High Calorie Oral Supplement  Code Status: DNR-CC    PT/OT

## 2020-05-30 LAB
BLOOD CULTURE, ROUTINE: NORMAL
CULTURE, BLOOD 2: NORMAL

## 2020-05-30 PROCEDURE — 6360000002 HC RX W HCPCS: Performed by: INTERNAL MEDICINE

## 2020-05-30 PROCEDURE — 6370000000 HC RX 637 (ALT 250 FOR IP): Performed by: INTERNAL MEDICINE

## 2020-05-30 PROCEDURE — 6370000000 HC RX 637 (ALT 250 FOR IP): Performed by: ANESTHESIOLOGY

## 2020-05-30 PROCEDURE — 1200000002 HC SEMI PRIVATE SWING BED

## 2020-05-30 PROCEDURE — 97110 THERAPEUTIC EXERCISES: CPT

## 2020-05-30 PROCEDURE — 2580000003 HC RX 258: Performed by: INTERNAL MEDICINE

## 2020-05-30 PROCEDURE — 97116 GAIT TRAINING THERAPY: CPT

## 2020-05-30 RX ADMIN — GABAPENTIN 200 MG: 100 CAPSULE ORAL at 20:37

## 2020-05-30 RX ADMIN — OXYCODONE HYDROCHLORIDE 10 MG: 5 TABLET ORAL at 09:08

## 2020-05-30 RX ADMIN — OXYCODONE HYDROCHLORIDE 10 MG: 5 TABLET ORAL at 17:25

## 2020-05-30 RX ADMIN — ACETAMINOPHEN 650 MG: 325 TABLET ORAL at 09:08

## 2020-05-30 RX ADMIN — OXYCODONE HYDROCHLORIDE 10 MG: 5 TABLET ORAL at 13:25

## 2020-05-30 RX ADMIN — ACETAMINOPHEN 650 MG: 325 TABLET ORAL at 16:29

## 2020-05-30 RX ADMIN — FAMOTIDINE 20 MG: 20 TABLET ORAL at 20:37

## 2020-05-30 RX ADMIN — FUROSEMIDE 20 MG: 20 TABLET ORAL at 09:08

## 2020-05-30 RX ADMIN — PIPERACILLIN AND TAZOBACTAM 3.38 G: 3; .375 INJECTION, POWDER, LYOPHILIZED, FOR SOLUTION INTRAVENOUS at 16:29

## 2020-05-30 RX ADMIN — Medication 10 ML: at 09:07

## 2020-05-30 RX ADMIN — ENOXAPARIN SODIUM 40 MG: 40 INJECTION SUBCUTANEOUS at 09:07

## 2020-05-30 RX ADMIN — OXYCODONE HYDROCHLORIDE 10 MG: 5 TABLET ORAL at 05:05

## 2020-05-30 RX ADMIN — OXYCODONE HYDROCHLORIDE 10 MG: 5 TABLET ORAL at 00:20

## 2020-05-30 RX ADMIN — MELOXICAM 7.5 MG: 7.5 TABLET ORAL at 09:07

## 2020-05-30 RX ADMIN — MAGNESIUM HYDROXIDE 30 ML: 400 SUSPENSION ORAL at 10:04

## 2020-05-30 RX ADMIN — FAMOTIDINE 20 MG: 20 TABLET ORAL at 09:07

## 2020-05-30 RX ADMIN — PIPERACILLIN AND TAZOBACTAM 3.38 G: 3; .375 INJECTION, POWDER, LYOPHILIZED, FOR SOLUTION INTRAVENOUS at 00:13

## 2020-05-30 RX ADMIN — ACETAMINOPHEN 650 MG: 325 TABLET ORAL at 12:12

## 2020-05-30 RX ADMIN — Medication 10 ML: at 20:37

## 2020-05-30 RX ADMIN — LEVOTHYROXINE SODIUM 75 MCG: 75 TABLET ORAL at 05:05

## 2020-05-30 RX ADMIN — OXYCODONE HYDROCHLORIDE 10 MG: 5 TABLET ORAL at 21:30

## 2020-05-30 RX ADMIN — PIPERACILLIN AND TAZOBACTAM 3.38 G: 3; .375 INJECTION, POWDER, LYOPHILIZED, FOR SOLUTION INTRAVENOUS at 09:06

## 2020-05-30 RX ADMIN — GABAPENTIN 200 MG: 100 CAPSULE ORAL at 14:37

## 2020-05-30 RX ADMIN — ACETAMINOPHEN 650 MG: 325 TABLET ORAL at 20:37

## 2020-05-30 RX ADMIN — GABAPENTIN 200 MG: 100 CAPSULE ORAL at 09:08

## 2020-05-30 ASSESSMENT — PAIN SCALES - GENERAL
PAINLEVEL_OUTOF10: 8
PAINLEVEL_OUTOF10: 7
PAINLEVEL_OUTOF10: 6
PAINLEVEL_OUTOF10: 5
PAINLEVEL_OUTOF10: 8
PAINLEVEL_OUTOF10: 6
PAINLEVEL_OUTOF10: 7
PAINLEVEL_OUTOF10: 5

## 2020-05-30 NOTE — PROGRESS NOTES
with increasing stability noted. slow steady pacing with FF   Gait Deviations: Decreased step length  Distance: 100'x1     Balance  Posture: Fair  Sitting - Static: Fair;+  Sitting - Dynamic: Fair  Standing - Static: Fair;-  Standing - Dynamic: Fair;-  Exercises  Gluteal Sets: x 20  Hip Flexion: x30 ea  Knee Long Arc Quad: x 25 BLE  Ankle Pumps: x 20  Comments: hip ADD with pillow squeeze   Other exercises  Other exercises?: Yes                        G-Code     OutComes Score                                                     AM-PAC Score             Goals  Short term goals  Time Frame for Short term goals: 1-2 weeks  Short term goal 1: transfer sit to stand with SBA and <1-2 vc for safety  Short term goal 2: bed mobility with <= SBA and increased time  Short term goal 3: amb >= 50ft before fatigued with CGA and ww  Short term goal 4: assess curb step wtih ww and Min A of 1  Long term goals  Time Frame for Long term goals : 6 weeks or length of IV needs   Long term goal 1: Modified independent bed mobility  Long term goal 2: Modified independent transfers with least AD  Long term goal 3: amb >=100ft with least AD modified independent  Long term goal 4: 1 curb step with AD and SBA to enter/exit home  Long term goal 5: indep with HEP to improve LE strength,   Patient Goals   Patient goals : \" Get stronger to go home. \" via mouthing words    Plan    Plan  Times per week: 5-7x week  Times per day: (QD to BID )  Plan weeks: 6 weeks or legnth of IVs   Current Treatment Recommendations: Strengthening, Functional Mobility Training, Transfer Training, Balance Training, Endurance Training, Stair training, Gait Training, Pain Management, Patient/Caregiver Education & Training, Safety Education & Training, Manual Therapy - Joint Manipulation, Home Exercise Program, Equipment Evaluation, Education, & procurement, Positioning  Plan Comment: Cont. POC  Safety Devices  Type of devices:  All fall risk precautions in place, Call light within reach     Therapy Time   Individual Concurrent Group Co-treatment   Time In  125         Time Out  [address] [place] [address]  License and [address] Number: [id number]

## 2020-05-31 PROCEDURE — 6360000002 HC RX W HCPCS: Performed by: INTERNAL MEDICINE

## 2020-05-31 PROCEDURE — 6370000000 HC RX 637 (ALT 250 FOR IP): Performed by: INTERNAL MEDICINE

## 2020-05-31 PROCEDURE — 97110 THERAPEUTIC EXERCISES: CPT

## 2020-05-31 PROCEDURE — 1200000002 HC SEMI PRIVATE SWING BED

## 2020-05-31 PROCEDURE — 97116 GAIT TRAINING THERAPY: CPT

## 2020-05-31 PROCEDURE — 2580000003 HC RX 258: Performed by: INTERNAL MEDICINE

## 2020-05-31 PROCEDURE — 6370000000 HC RX 637 (ALT 250 FOR IP): Performed by: ANESTHESIOLOGY

## 2020-05-31 RX ADMIN — ACETAMINOPHEN 650 MG: 325 TABLET ORAL at 20:38

## 2020-05-31 RX ADMIN — OXYCODONE HYDROCHLORIDE 10 MG: 5 TABLET ORAL at 10:25

## 2020-05-31 RX ADMIN — PIPERACILLIN AND TAZOBACTAM 3.38 G: 3; .375 INJECTION, POWDER, LYOPHILIZED, FOR SOLUTION INTRAVENOUS at 00:04

## 2020-05-31 RX ADMIN — GABAPENTIN 200 MG: 100 CAPSULE ORAL at 14:30

## 2020-05-31 RX ADMIN — ACETAMINOPHEN 650 MG: 325 TABLET ORAL at 16:58

## 2020-05-31 RX ADMIN — ENOXAPARIN SODIUM 40 MG: 40 INJECTION SUBCUTANEOUS at 09:00

## 2020-05-31 RX ADMIN — PIPERACILLIN AND TAZOBACTAM 3.38 G: 3; .375 INJECTION, POWDER, LYOPHILIZED, FOR SOLUTION INTRAVENOUS at 09:00

## 2020-05-31 RX ADMIN — OXYCODONE HYDROCHLORIDE 10 MG: 5 TABLET ORAL at 14:30

## 2020-05-31 RX ADMIN — ACETAMINOPHEN 650 MG: 325 TABLET ORAL at 09:01

## 2020-05-31 RX ADMIN — OXYCODONE HYDROCHLORIDE 10 MG: 5 TABLET ORAL at 18:29

## 2020-05-31 RX ADMIN — FUROSEMIDE 20 MG: 20 TABLET ORAL at 09:01

## 2020-05-31 RX ADMIN — FAMOTIDINE 20 MG: 20 TABLET ORAL at 09:01

## 2020-05-31 RX ADMIN — Medication 10 ML: at 09:00

## 2020-05-31 RX ADMIN — GABAPENTIN 200 MG: 100 CAPSULE ORAL at 09:01

## 2020-05-31 RX ADMIN — LEVOTHYROXINE SODIUM 75 MCG: 75 TABLET ORAL at 06:20

## 2020-05-31 RX ADMIN — SENNOSIDES AND DOCUSATE SODIUM 2 TABLET: 8.6; 5 TABLET ORAL at 09:01

## 2020-05-31 RX ADMIN — MELOXICAM 7.5 MG: 7.5 TABLET ORAL at 09:01

## 2020-05-31 RX ADMIN — GABAPENTIN 200 MG: 100 CAPSULE ORAL at 20:38

## 2020-05-31 RX ADMIN — Medication 10 ML: at 20:39

## 2020-05-31 RX ADMIN — OXYCODONE HYDROCHLORIDE 10 MG: 5 TABLET ORAL at 22:31

## 2020-05-31 RX ADMIN — OXYCODONE HYDROCHLORIDE 10 MG: 5 TABLET ORAL at 06:20

## 2020-05-31 RX ADMIN — FAMOTIDINE 20 MG: 20 TABLET ORAL at 20:39

## 2020-05-31 RX ADMIN — ACETAMINOPHEN 650 MG: 325 TABLET ORAL at 14:30

## 2020-05-31 RX ADMIN — PIPERACILLIN AND TAZOBACTAM 3.38 G: 3; .375 INJECTION, POWDER, LYOPHILIZED, FOR SOLUTION INTRAVENOUS at 16:50

## 2020-05-31 ASSESSMENT — PAIN - FUNCTIONAL ASSESSMENT
PAIN_FUNCTIONAL_ASSESSMENT: 0-10
PAIN_FUNCTIONAL_ASSESSMENT: 0-10

## 2020-05-31 ASSESSMENT — PAIN SCALES - GENERAL
PAINLEVEL_OUTOF10: 6
PAINLEVEL_OUTOF10: 7
PAINLEVEL_OUTOF10: 5

## 2020-05-31 NOTE — PROGRESS NOTES
focal sensory/motor deficits. Cranial nerves: II-XII intact, grossly non-focal.  Psychiatric: Alert and oriented, thought content appropriate, normal insight  Capillary Refill: Brisk,< 3 seconds   Peripheral Pulses: +2 palpable, equal bilaterally       Labs:   No results for input(s): WBC, HGB, HCT, PLT in the last 72 hours. No results for input(s): NA, K, CL, CO2, BUN, CREATININE, CALCIUM, PHOS in the last 72 hours. Invalid input(s): MAGNES  No results for input(s): AST, ALT, BILIDIR, BILITOT, ALKPHOS in the last 72 hours. No results for input(s): INR in the last 72 hours. No results for input(s): Kaykay Newer in the last 72 hours.     Urinalysis:      Lab Results   Component Value Date    NITRU Negative 05/14/2020    WBCUA 0-2 05/14/2020    BACTERIA Negative 05/14/2020    RBCUA 0-2 05/14/2020    BLOODU Negative 05/14/2020    SPECGRAV 1.025 05/14/2020    GLUCOSEU Negative 05/14/2020       Radiology:  IR Picc Equal or Greater Than 5 Years   Final Result              Assessment/Plan:    Active Hospital Problems    Diagnosis Date Noted    Therapeutic opioid-induced constipation (OIC) [K59.03, T40.2X5A] 05/23/2020    Streptococcal bacteremia [R78.81, B95.5]     Chest wall pain [R07.89] 05/20/2020    Neck pain on left side [M54.2] 05/20/2020    Septic arthritis of AC joint ([facility] [address] [M00.9] 05/20/2020    Thigh pain, musculoskeletal, left [M79.652] 05/20/2020    Neuropathic pain of thigh, left [M79.2] 05/20/2020    Laryngeal carcinoma ([facility] [address] [C32.9] 05/14/2020    Cellulitis [L03.90] 05/14/2020    Frequent falls [R29.6] 05/14/2020    Weakness [R53.1] 05/14/2020    Gastroesophageal reflux [K21.9] 05/14/2020         DVT Prophylaxis: lovenox   Diet: Dietary Nutrition Supplements: Other Oral Supplement (see comment)  DIET GENERAL; Dysphagia Soft and Bite-Sized  Dietary Nutrition Supplements: Standard High Calorie Oral Supplement  Code Status: DNR-CC    PT/OT Eval Status: done    Dispo - Bacteremia, septic

## 2020-05-31 NOTE — PROGRESS NOTES
Therapy Time   Individual Concurrent Group Co-treatment   Time In  1130         Time Out  1200         Minutes  [facility]  License and [address] Number: [id number]

## 2020-06-01 LAB
ANION GAP SERPL CALCULATED.3IONS-SCNC: 10 MEQ/L (ref 9–15)
BASOPHILS ABSOLUTE: 0.1 K/UL (ref 0–0.2)
BASOPHILS RELATIVE PERCENT: 2 %
BUN BLDV-MCNC: 10 MG/DL (ref 8–23)
C-REACTIVE PROTEIN, HIGH SENSITIVITY: 14.2 MG/L (ref 0–5)
CALCIUM SERPL-MCNC: 9.5 MG/DL (ref 8.5–9.9)
CHLORIDE BLD-SCNC: 101 MEQ/L (ref 95–107)
CO2: 27 MEQ/L (ref 20–31)
CREAT SERPL-MCNC: 0.59 MG/DL (ref 0.7–1.2)
EOSINOPHILS ABSOLUTE: 0.2 K/UL (ref 0–0.7)
EOSINOPHILS RELATIVE PERCENT: 4.8 %
GFR AFRICAN AMERICAN: >60
GFR NON-AFRICAN AMERICAN: >60
GLUCOSE BLD-MCNC: 97 MG/DL (ref 70–99)
HCT VFR BLD CALC: 33.8 % (ref 42–52)
HEMOGLOBIN: 11.2 G/DL (ref 14–18)
LYMPHOCYTES ABSOLUTE: 1.1 K/UL (ref 1–4.8)
LYMPHOCYTES RELATIVE PERCENT: 30.1 %
MCH RBC QN AUTO: 31.1 PG (ref 27–31.3)
MCHC RBC AUTO-ENTMCNC: 33 % (ref 33–37)
MCV RBC AUTO: 94.3 FL (ref 80–100)
MONOCYTES ABSOLUTE: 0.4 K/UL (ref 0.2–0.8)
MONOCYTES RELATIVE PERCENT: 11.6 %
NEUTROPHILS ABSOLUTE: 1.9 K/UL (ref 1.4–6.5)
NEUTROPHILS RELATIVE PERCENT: 51.5 %
PDW BLD-RTO: 14.8 % (ref 11.5–14.5)
PLATELET # BLD: 494 K/UL (ref 130–400)
POTASSIUM SERPL-SCNC: 4.2 MEQ/L (ref 3.4–4.9)
RBC # BLD: 3.59 M/UL (ref 4.7–6.1)
SODIUM BLD-SCNC: 138 MEQ/L (ref 135–144)
WBC # BLD: 3.7 K/UL (ref 4.8–10.8)

## 2020-06-01 PROCEDURE — 6370000000 HC RX 637 (ALT 250 FOR IP): Performed by: INTERNAL MEDICINE

## 2020-06-01 PROCEDURE — 86141 C-REACTIVE PROTEIN HS: CPT

## 2020-06-01 PROCEDURE — 36592 COLLECT BLOOD FROM PICC: CPT

## 2020-06-01 PROCEDURE — 80048 BASIC METABOLIC PNL TOTAL CA: CPT

## 2020-06-01 PROCEDURE — 2580000003 HC RX 258: Performed by: INTERNAL MEDICINE

## 2020-06-01 PROCEDURE — 85025 COMPLETE CBC W/AUTO DIFF WBC: CPT

## 2020-06-01 PROCEDURE — 97535 SELF CARE MNGMENT TRAINING: CPT

## 2020-06-01 PROCEDURE — 6370000000 HC RX 637 (ALT 250 FOR IP): Performed by: ANESTHESIOLOGY

## 2020-06-01 PROCEDURE — 1200000002 HC SEMI PRIVATE SWING BED

## 2020-06-01 PROCEDURE — 97116 GAIT TRAINING THERAPY: CPT

## 2020-06-01 PROCEDURE — 97530 THERAPEUTIC ACTIVITIES: CPT

## 2020-06-01 PROCEDURE — 6360000002 HC RX W HCPCS: Performed by: INTERNAL MEDICINE

## 2020-06-01 PROCEDURE — 97110 THERAPEUTIC EXERCISES: CPT

## 2020-06-01 RX ADMIN — Medication 10 ML: at 09:41

## 2020-06-01 RX ADMIN — FAMOTIDINE 20 MG: 20 TABLET ORAL at 09:42

## 2020-06-01 RX ADMIN — GABAPENTIN 200 MG: 100 CAPSULE ORAL at 20:59

## 2020-06-01 RX ADMIN — Medication 10 ML: at 16:53

## 2020-06-01 RX ADMIN — OXYCODONE HYDROCHLORIDE 10 MG: 5 TABLET ORAL at 04:27

## 2020-06-01 RX ADMIN — PIPERACILLIN AND TAZOBACTAM 3.38 G: 3; .375 INJECTION, POWDER, LYOPHILIZED, FOR SOLUTION INTRAVENOUS at 00:19

## 2020-06-01 RX ADMIN — GABAPENTIN 200 MG: 100 CAPSULE ORAL at 09:42

## 2020-06-01 RX ADMIN — PIPERACILLIN AND TAZOBACTAM 3.38 G: 3; .375 INJECTION, POWDER, LYOPHILIZED, FOR SOLUTION INTRAVENOUS at 23:16

## 2020-06-01 RX ADMIN — OXYCODONE HYDROCHLORIDE 10 MG: 5 TABLET ORAL at 08:41

## 2020-06-01 RX ADMIN — ACETAMINOPHEN 650 MG: 325 TABLET ORAL at 09:41

## 2020-06-01 RX ADMIN — GABAPENTIN 200 MG: 100 CAPSULE ORAL at 12:59

## 2020-06-01 RX ADMIN — ENOXAPARIN SODIUM 40 MG: 40 INJECTION SUBCUTANEOUS at 09:41

## 2020-06-01 RX ADMIN — PIPERACILLIN AND TAZOBACTAM 3.38 G: 3; .375 INJECTION, POWDER, LYOPHILIZED, FOR SOLUTION INTRAVENOUS at 09:41

## 2020-06-01 RX ADMIN — SENNOSIDES AND DOCUSATE SODIUM 2 TABLET: 8.6; 5 TABLET ORAL at 09:41

## 2020-06-01 RX ADMIN — OXYCODONE HYDROCHLORIDE 10 MG: 5 TABLET ORAL at 12:59

## 2020-06-01 RX ADMIN — OXYCODONE HYDROCHLORIDE 10 MG: 5 TABLET ORAL at 20:58

## 2020-06-01 RX ADMIN — ACETAMINOPHEN 650 MG: 325 TABLET ORAL at 21:10

## 2020-06-01 RX ADMIN — MELOXICAM 7.5 MG: 7.5 TABLET ORAL at 09:42

## 2020-06-01 RX ADMIN — LEVOTHYROXINE SODIUM 75 MCG: 75 TABLET ORAL at 06:03

## 2020-06-01 RX ADMIN — FAMOTIDINE 20 MG: 20 TABLET ORAL at 21:00

## 2020-06-01 RX ADMIN — ACETAMINOPHEN 650 MG: 325 TABLET ORAL at 16:53

## 2020-06-01 RX ADMIN — ACETAMINOPHEN 650 MG: 325 TABLET ORAL at 12:58

## 2020-06-01 RX ADMIN — PIPERACILLIN AND TAZOBACTAM 3.38 G: 3; .375 INJECTION, POWDER, LYOPHILIZED, FOR SOLUTION INTRAVENOUS at 16:52

## 2020-06-01 RX ADMIN — Medication 10 ML: at 21:00

## 2020-06-01 RX ADMIN — OXYCODONE HYDROCHLORIDE 10 MG: 5 TABLET ORAL at 16:53

## 2020-06-01 RX ADMIN — FUROSEMIDE 20 MG: 20 TABLET ORAL at 09:42

## 2020-06-01 ASSESSMENT — PAIN DESCRIPTION - LOCATION
LOCATION: SHOULDER

## 2020-06-01 ASSESSMENT — PAIN DESCRIPTION - PROGRESSION
CLINICAL_PROGRESSION: GRADUALLY IMPROVING
CLINICAL_PROGRESSION: GRADUALLY WORSENING
CLINICAL_PROGRESSION: GRADUALLY IMPROVING
CLINICAL_PROGRESSION: GRADUALLY WORSENING

## 2020-06-01 ASSESSMENT — PAIN SCALES - GENERAL
PAINLEVEL_OUTOF10: 8
PAINLEVEL_OUTOF10: 8
PAINLEVEL_OUTOF10: 7
PAINLEVEL_OUTOF10: 6
PAINLEVEL_OUTOF10: 8
PAINLEVEL_OUTOF10: 6
PAINLEVEL_OUTOF10: 5
PAINLEVEL_OUTOF10: 6
PAINLEVEL_OUTOF10: 7
PAINLEVEL_OUTOF10: 6
PAINLEVEL_OUTOF10: 2

## 2020-06-01 ASSESSMENT — PAIN DESCRIPTION - DESCRIPTORS
DESCRIPTORS: ACHING;SORE

## 2020-06-01 ASSESSMENT — PAIN - FUNCTIONAL ASSESSMENT
PAIN_FUNCTIONAL_ASSESSMENT: PREVENTS OR INTERFERES SOME ACTIVE ACTIVITIES AND ADLS

## 2020-06-01 ASSESSMENT — PAIN DESCRIPTION - ONSET
ONSET: ON-GOING

## 2020-06-01 ASSESSMENT — PAIN DESCRIPTION - FREQUENCY
FREQUENCY: CONTINUOUS

## 2020-06-01 ASSESSMENT — PAIN DESCRIPTION - PAIN TYPE
TYPE: ACUTE PAIN

## 2020-06-01 ASSESSMENT — PAIN DESCRIPTION - ORIENTATION
ORIENTATION: LEFT

## 2020-06-01 NOTE — PROGRESS NOTES
Physical Therapy  Facility/Department: [facility] MED SURG UNIT  Daily Treatment Note  NAME: Roberto Chambers  : 1960  MRN: [id number]    Date of Service: 2020    Discharge Recommendations:  Home with assist PRN, Home with Home health PT        Assessment   Body structures, Functions, Activity limitations: Decreased endurance;Decreased functional mobility ; Decreased strength; Increased pain  Assessment: Pt. demos slow fei wiht gait but no inc in pain steady pacing using FWW. Pt. able to complete BLE seated therex post gait with no inc in pain. Cont to inc as tolerates. Pt. up in recliner post call light within reach. Treatment Diagnosis: weakness with decreased fucn tinoal mobiltiy independence due to cellulits chest wall  Prognosis: Good  Decision Making: Medium Complexity  REQUIRES PT FOLLOW UP: Yes  Activity Tolerance  Activity Tolerance: Patient Tolerated treatment well     Patient Diagnosis(es): There were no encounter diagnoses. has a past medical history of Hepatitis B, Hepatitis C, Hypothyroid, and Other specified disorders of brain. has no past surgical history on file. Restrictions  Restrictions/Precautions  Restrictions/Precautions: Fall Risk  Required Braces or Orthoses?: No  Subjective   General  Chart Reviewed: Yes  Additional Pertinent Hx: Trach for at least 5 years, now iwth cellulitis of chest wall/pain and  need for antibiotics for 6 weeks  Family / Caregiver Present: No  Referring Practitioner: Dr Kerry Whittaker  Subjective  Subjective: Pt. sitting up in chair agreeable to thereapy. Pt. mouthing he has some pan in trach but able to continue with PT treatment.        Pre Treatment Pain Screening  Pain at present: 7  Scale Used: Numeric Score  Intervention List: Patient able to continue with treatment  Comments / Details: trach     Orientation     Cognition      Objective      Transfers  Sit to Stand: Stand by assistance  Stand to sit: Stand by assistance  Bed to Chair: Contact guard assistance(inc time for turning 180 deg )  Ambulation  Ambulation?: Yes  Ambulation 1  Surface: level tile  Device: Rolling Walker  Assistance: Contact guard assistance  Gait Deviations: Decreased step length;Decreased step height; Increased ROSEMARIE  Distance: 80' x 2 one short standing RB   Comments: VC for more upright posture slow fei   Stairs/Curb  Stairs?: No     Balance  Posture: Fair  Sitting - Static: Fair;+  Sitting - Dynamic: Fair  Standing - Static: Fair;-  Standing - Dynamic: Fair;-  Exercises  Gluteal Sets: x20 H3   Hip Flexion: x20   Hip Abduction: x20   Ankle Pumps: x 20   Comments: hip ADD with pillow squeeze  x20                        G-Code     OutComes Score                                                     AM-PAC Score             Goals  Short term goals  Time Frame for Short term goals: 1-2 weeks  Short term goal 1: transfer sit to stand with SBA and <1-2 vc for safety  Short term goal 2: bed mobility with <= SBA and increased time  Short term goal 3: amb >= 50ft before fatigued with CGA and ww  Short term goal 4: assess curb step wtih ww and Min A of 1  Long term goals  Time Frame for Long term goals : 6 weeks or length of IV needs   Long term goal 1: Modified independent bed mobility  Long term goal 2: Modified independent transfers with least AD  Long term goal 3: amb >=100ft with least AD modified independent  Long term goal 4: 1 curb step with AD and SBA to enter/exit home  Long term goal 5: indep with HEP to improve LE strength,   Patient Goals   Patient goals : \" Get stronger to go home. \" via mouthing words    Plan    Plan  Times per week: 5-7x week  Times per day: (QD to BID )  Plan weeks: 6 weeks or legnth of IVs   Current Treatment Recommendations: Strengthening, Functional Mobility Training, Transfer Training, Balance Training, Endurance Training, Stair training, Gait Training, Pain Management, Patient/Caregiver Education & Training, Safety Education & Training, Manual Therapy -

## 2020-06-01 NOTE — PROGRESS NOTES
Occupational Therapy  Facility/Department: [facility] MED SURG UNIT  Daily Treatment Note  NAME: Roberto Chambers  : 1960  MRN: [id number]    Date of Service: 2020    Discharge Recommendations:  Home with Home health OT, Home with assist PRN       Assessment   Performance deficits / Impairments: Decreased functional mobility ; Decreased ADL status; Decreased ROM; Decreased strength;Decreased endurance;Decreased sensation;Decreased balance  Assessment: improved standing balance awareness of unsteady or dizzy ness , appropriate STS as needed for ADL at sink ,0 LOB this day  REQUIRES OT FOLLOW UP: Yes  Activity Tolerance  Activity Tolerance: Patient Tolerated treatment well         Patient Diagnosis(es): There were no encounter diagnoses. has a past medical history of Hepatitis B, Hepatitis C, Hypothyroid, and Other specified disorders of brain. has no past surgical history on file.     Restrictions  Restrictions/Precautions  Restrictions/Precautions: Fall Risk  Required Braces or Orthoses?: No  Subjective   General  Chart Reviewed: Yes  Patient assessed for rehabilitation services?: Yes  Family / Caregiver Present: No  Referring Practitioner: Kaity Layne MD  Subjective  Subjective: pt is cooperative   Pain Assessment  Pain Level: 6   Orientation     Objective    ADL  Feeding: Independent  Grooming: Setup;Stand by assistance(at stand at sink)  UE Bathing: Stand by assistance  LE Bathing: Stand by assistance;Contact guard assistance  UE Dressing: Stand by assistance  LE Dressing: Stand by assistance;Contact guard assistance  Additional Comments: 0 LOB this ADL at sink ,pt is aware of some dizzyness ,STS accordingly uses bench seated for LB /feet ADL 0 lob        Balance  Sitting Balance: Modified independent   Standing Balance: Stand by assistance  Standing Balance  Time: 10 min  Activity: ADL at sink  Comment: improved awareness for safety STS  Functional Mobility  Functional - Mobility Device: Rolling Walker  Activity: To/from bathroom  Assist Level: Contact guard assistance     Transfers  Sit to stand: Stand by assistance;Contact guard assistance  Stand to sit: Stand by assistance  Transfer Comments: uses shower bench at sink for ADL                                                                 Plan   Plan  Times per week: 3-6 xs/week  Times per day: Daily  Specific instructions for Next Treatment: full ADL  Current Treatment Recommendations: Strengthening, ROM, Balance Training, Functional Mobility Training, Endurance Training, Pain Management, Safety Education & Training, Patient/Caregiver Education & Training, Equipment Evaluation, Education, & procurement, Positioning, Home Management Training, Self-Care / ADL  Plan Comment: Pt. requires skilled OT intervention services needed to increase balance, strength, and endurance needed to increase safety and independence with ADLs, transfers, and functional mobility. G-Code     OutComes Score                                                  AM-PAC Score             Goals  Short term goals  Time Frame for Short term goals: 10 days to 3 weeks or length of IV  Short term goal 1: same as LTG  Short term goal 2: same as LTG  Short term goal 3: same as LTG  Short term goal 4: same as LTG  Short term goal 5: same as LTG  Long term goals  Time Frame for Long term goals : 10 days to 3 weeks or length of IV  Long term goal 1: Pt. will complete UB/LB bathing at mod I level. Long term goal 2: Pt. will complete UB/LB dressing at mod I level. Long term goal 3: Pt. will complete functional transfers at mod I level. Long term goal 4: Pt. will demonstrate good dynamic standing balance during ADL completion with mod I.  Long term goal 5: Pt. will complete BUE strengthening/ROM exercises needed for transfers and ADLs.   Patient Goals   Patient goals : to get better       Therapy Time   Individual Concurrent Group Co-treatment   Time In  8:30         Time Out  9:30         Minutes [address] [place] [address] & [facility] [address] Number: [id number]

## 2020-06-01 NOTE — PROGRESS NOTES
Evaluation, Education, & procurement, Positioning  Plan Comment: Cont. POC  Safety Devices  Type of devices:  All fall risk precautions in place, Call light within reach, Gait belt, Left in chair     Therapy Time   Individual Concurrent Group Co-treatment   Time In  145         Time Out  [address] [facility] , PTA  License and [address] Number: [id number]

## 2020-06-02 PROCEDURE — 6370000000 HC RX 637 (ALT 250 FOR IP): Performed by: INTERNAL MEDICINE

## 2020-06-02 PROCEDURE — 2580000003 HC RX 258: Performed by: INTERNAL MEDICINE

## 2020-06-02 PROCEDURE — 97110 THERAPEUTIC EXERCISES: CPT

## 2020-06-02 PROCEDURE — 6360000002 HC RX W HCPCS: Performed by: INTERNAL MEDICINE

## 2020-06-02 PROCEDURE — 6370000000 HC RX 637 (ALT 250 FOR IP): Performed by: ANESTHESIOLOGY

## 2020-06-02 PROCEDURE — 97530 THERAPEUTIC ACTIVITIES: CPT

## 2020-06-02 PROCEDURE — 97116 GAIT TRAINING THERAPY: CPT

## 2020-06-02 PROCEDURE — 1200000002 HC SEMI PRIVATE SWING BED

## 2020-06-02 RX ORDER — SENNA AND DOCUSATE SODIUM 50; 8.6 MG/1; MG/1
2 TABLET, FILM COATED ORAL 2 TIMES DAILY
Status: DISCONTINUED | OUTPATIENT
Start: 2020-06-02 | End: 2020-06-15 | Stop reason: HOSPADM

## 2020-06-02 RX ADMIN — ACETAMINOPHEN 650 MG: 325 TABLET ORAL at 13:19

## 2020-06-02 RX ADMIN — ENOXAPARIN SODIUM 40 MG: 40 INJECTION SUBCUTANEOUS at 09:19

## 2020-06-02 RX ADMIN — GABAPENTIN 200 MG: 100 CAPSULE ORAL at 13:18

## 2020-06-02 RX ADMIN — PIPERACILLIN AND TAZOBACTAM 3.38 G: 3; .375 INJECTION, POWDER, LYOPHILIZED, FOR SOLUTION INTRAVENOUS at 22:01

## 2020-06-02 RX ADMIN — MELOXICAM 7.5 MG: 7.5 TABLET ORAL at 09:19

## 2020-06-02 RX ADMIN — Medication 10 ML: at 19:33

## 2020-06-02 RX ADMIN — Medication 10 ML: at 13:18

## 2020-06-02 RX ADMIN — ACETAMINOPHEN 650 MG: 325 TABLET ORAL at 17:04

## 2020-06-02 RX ADMIN — FAMOTIDINE 20 MG: 20 TABLET ORAL at 19:32

## 2020-06-02 RX ADMIN — FUROSEMIDE 20 MG: 20 TABLET ORAL at 09:19

## 2020-06-02 RX ADMIN — GABAPENTIN 200 MG: 100 CAPSULE ORAL at 09:19

## 2020-06-02 RX ADMIN — OXYCODONE HYDROCHLORIDE 10 MG: 5 TABLET ORAL at 13:19

## 2020-06-02 RX ADMIN — ACETAMINOPHEN 650 MG: 325 TABLET ORAL at 19:32

## 2020-06-02 RX ADMIN — ACETAMINOPHEN 650 MG: 325 TABLET ORAL at 09:19

## 2020-06-02 RX ADMIN — OXYCODONE HYDROCHLORIDE 10 MG: 5 TABLET ORAL at 05:22

## 2020-06-02 RX ADMIN — OXYCODONE HYDROCHLORIDE 10 MG: 5 TABLET ORAL at 20:49

## 2020-06-02 RX ADMIN — OXYCODONE HYDROCHLORIDE 10 MG: 5 TABLET ORAL at 00:54

## 2020-06-02 RX ADMIN — FAMOTIDINE 20 MG: 20 TABLET ORAL at 09:23

## 2020-06-02 RX ADMIN — PIPERACILLIN AND TAZOBACTAM 3.38 G: 3; .375 INJECTION, POWDER, LYOPHILIZED, FOR SOLUTION INTRAVENOUS at 13:18

## 2020-06-02 RX ADMIN — LEVOTHYROXINE SODIUM 75 MCG: 75 TABLET ORAL at 05:22

## 2020-06-02 RX ADMIN — DOCUSATE SODIUM AND SENNOSIDES 2 TABLET: 50; 8.6 TABLET, FILM COATED ORAL at 19:31

## 2020-06-02 RX ADMIN — OXYCODONE HYDROCHLORIDE 10 MG: 5 TABLET ORAL at 09:19

## 2020-06-02 RX ADMIN — DOCUSATE SODIUM AND SENNOSIDES 2 TABLET: 50; 8.6 TABLET, FILM COATED ORAL at 09:19

## 2020-06-02 RX ADMIN — OXYCODONE HYDROCHLORIDE 10 MG: 5 TABLET ORAL at 17:04

## 2020-06-02 RX ADMIN — GABAPENTIN 200 MG: 100 CAPSULE ORAL at 19:31

## 2020-06-02 ASSESSMENT — PAIN SCALES - GENERAL
PAINLEVEL_OUTOF10: 5
PAINLEVEL_OUTOF10: 8
PAINLEVEL_OUTOF10: 7
PAINLEVEL_OUTOF10: 6
PAINLEVEL_OUTOF10: 2
PAINLEVEL_OUTOF10: 8
PAINLEVEL_OUTOF10: 8
PAINLEVEL_OUTOF10: 9
PAINLEVEL_OUTOF10: 7
PAINLEVEL_OUTOF10: 2

## 2020-06-02 ASSESSMENT — PAIN DESCRIPTION - ORIENTATION
ORIENTATION: LEFT

## 2020-06-02 ASSESSMENT — PAIN DESCRIPTION - PAIN TYPE
TYPE: ACUTE PAIN

## 2020-06-02 ASSESSMENT — PAIN DESCRIPTION - ONSET
ONSET: ON-GOING

## 2020-06-02 ASSESSMENT — PAIN - FUNCTIONAL ASSESSMENT
PAIN_FUNCTIONAL_ASSESSMENT: PREVENTS OR INTERFERES SOME ACTIVE ACTIVITIES AND ADLS

## 2020-06-02 ASSESSMENT — PAIN DESCRIPTION - PROGRESSION
CLINICAL_PROGRESSION: GRADUALLY WORSENING
CLINICAL_PROGRESSION: GRADUALLY WORSENING
CLINICAL_PROGRESSION: GRADUALLY IMPROVING
CLINICAL_PROGRESSION: GRADUALLY WORSENING
CLINICAL_PROGRESSION: NOT CHANGED
CLINICAL_PROGRESSION: GRADUALLY IMPROVING
CLINICAL_PROGRESSION: GRADUALLY IMPROVING
CLINICAL_PROGRESSION: GRADUALLY WORSENING

## 2020-06-02 ASSESSMENT — PAIN DESCRIPTION - DESCRIPTORS
DESCRIPTORS: ACHING;SORE

## 2020-06-02 ASSESSMENT — PAIN DESCRIPTION - FREQUENCY
FREQUENCY: CONTINUOUS

## 2020-06-02 ASSESSMENT — PAIN DESCRIPTION - LOCATION
LOCATION: SHOULDER

## 2020-06-02 NOTE — PROGRESS NOTES
Therapy Time   Individual Concurrent Group Co-treatment   Time In  210         Time Out  Srinivasa Valdivia PTA  License and [address] Number: [id number]

## 2020-06-02 NOTE — PROGRESS NOTES
PROGRESS NOTE -PAIN MANAGEMENT   [facility] skilled inpatient facility    SERVICE DATE:  6/2/2020   SERVICE TIME:  4:57 PM    CHIEFCOMPLAINT: Left chest wall pain left neck pain      SUBJECTIVE:  Mr. Alec Barahona is a 60 y.o. 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     History of laryngeal cancer, patient seem to be very comfortable on the antibiotic treatment provided    Patient states  pain is well controlled on Duragesic patch, meloxicam and oxycodone for breakthrough pain    PAIN  ASSESSMENT:    intermittent    aching    pain is perceived as moderate (4-6 pain scale)      MEDICATIONS:    Current Facility-Administered Medications   Medication Dose Route Frequency Provider Last Rate Last Dose    sennosides-docusate sodium (SENOKOT-S) 8.6-50 MG tablet 2 tablet  2 tablet Oral BID Joycelyn Salgado MD   2 tablet at 06/02/20 0919    piperacillin-tazobactam (ZOSYN) 3.375 g in dextrose 5 % 50 mL IVPB extended infusion (mini-bag)  3.375 g Intravenous 3 times per day Joycelyn Salgado MD 12.5 mL/hr at 06/02/20 1318 3.375 g at 06/02/20 1318    furosemide (LASIX) tablet 20 mg  20 mg Oral Daily Zonia Monaco MD   20 mg at 06/02/20 0919    methylnaltrexone (RELISTOR) injection 12 mg  12 mg Subcutaneous Q48H PRN Stephanie Moreno MD   12 mg at 05/24/20 7134    sodium chloride flush 0.9 % injection 10 mL  10 mL Intravenous 2 times per day Augie Monaco MD   10 mL at 06/02/20 1318    sodium chloride flush 0.9 % injection 10 mL  10 mL Intravenous PRN Augie Monaco MD   10 mL at 06/01/20 1653    polyethylene glycol (GLYCOLAX) packet 17 g  17 g Oral Daily Joycelyn Salgado MD   17 g at 05/24/20 0821    magnesium hydroxide (MILK OF MAGNESIA) 400 MG/5ML suspension 30 mL  30 mL Oral Daily PRN Joycelyn Salgado MD        fentaNYL (DURAGESIC) 12 MCG/HR 1 patch  1 patch Transdermal Q72H Stephanie Moreno MD   1 patch at 06/01/20 2100    gabapentin 06/01/20  0626   WBC 3.7*   RBC 3.59*   HGB 11.2*   HCT 33.8*   MCV 94.3   MCH 31.1   MCHC 33.0   RDW 14.8*   *       Recent Labs     06/01/20  0626      K 4.2      CO2 27   BUN 10   CREATININE 0.59*   GLUCOSE 97   CALCIUM 9.5       No results for input(s): MG in the last 72 hours. Ir Picc Equal Or Greater Than 5 Years    Result Date: 5/27/2020  PERIPHERALLY INSERTED CENTRAL CATHETER (PICC) PLACEMENT WITH ULTRASOUND GUIDANCE CLINICAL HISTORY:  REQUIRES PROLONGED INTRAVENOUS ACCESS FOR TREATMENT OF CELLULITIS. After discussing the procedure and possible complications with the patient, informed consent was obtained. The patient was placed on the Special Procedures table. The left upper extremity was sterilely prepared using a maximal sterile barrier technique  which includes cap, mask, sterile gown, sterile gloves, sterile full-body drape, hand hygiene and 2% chlorhexidine for cutaneous antisepsis. A sterile ultrasound technique with sterile gel and sterile probe covers was also utilized. A pre-procedure time out was performed in order to assure the correct patient and procedure. Local anesthetic was administered. A peripheral vein was accessed with sonographic guidance. A sonographic spot image was obtained for documentation. A guidewire was advanced into the vein with fluoroscopic guidance and a sheath was placed over the guidewire. A 5-Upper sorbian dual-lumen PICC was advanced through the sheath, up the arm and into the central vasculature. It was positioned appropriately. The sheath was removed. The catheter was shown to aspirate and infuse properly. The flange of the catheter was affixed to the arm using a PICC securement device. A spot image of the chest showed the tip of the PICC line to lie in the superior vena cava. The patient tolerated the procedure well and without complications.   Number of films: 3 Fluoroscopy time: 6.1 seconds CONCLUSION: SUCCESSFUL LEFT PICC PLACEMENT

## 2020-06-02 NOTE — PROGRESS NOTES
without any focal sensory/motor deficits. Cranial nerves: II-XII intact, grossly non-focal.  Psychiatric: Alert and oriented, thought content appropriate, normal insight  Capillary Refill: Brisk,< 3 seconds   Peripheral Pulses: +2 palpable, equal bilaterally       Labs:   Recent Labs     06/01/20  0626   WBC 3.7*   HGB 11.2*   HCT 33.8*   *     Recent Labs     06/01/20  0626      K 4.2      CO2 27   BUN 10   CREATININE 0.59*   CALCIUM 9.5     No results for input(s): AST, ALT, BILIDIR, BILITOT, ALKPHOS in the last 72 hours. No results for input(s): INR in the last 72 hours. No results for input(s): Kaykay Newer in the last 72 hours.     Urinalysis:      Lab Results   Component Value Date    NITRU Negative 05/14/2020    WBCUA 0-2 05/14/2020    BACTERIA Negative 05/14/2020    RBCUA 0-2 05/14/2020    BLOODU Negative 05/14/2020    SPECGRAV 1.025 05/14/2020    GLUCOSEU Negative 05/14/2020       Radiology:  IR Picc Equal or Greater Than 5 Years   Final Result              Assessment/Plan:    Active Hospital Problems    Diagnosis Date Noted    Therapeutic opioid-induced constipation (OIC) [K59.03, T40.2X5A] 05/23/2020    Streptococcal bacteremia [R78.81, B95.5]     Chest wall pain [R07.89] 05/20/2020    Neck pain on left side [M54.2] 05/20/2020    Septic arthritis of AC joint ([facility] [address] [M00.9] 05/20/2020    Thigh pain, musculoskeletal, left [M79.652] 05/20/2020    Neuropathic pain of thigh, left [M79.2] 05/20/2020    Laryngeal carcinoma ([facility] [address] [C32.9] 05/14/2020    Cellulitis [L03.90] 05/14/2020    Frequent falls [R29.6] 05/14/2020    Weakness [R53.1] 05/14/2020    Gastroesophageal reflux [K21.9] 05/14/2020         DVT Prophylaxis: lovenox  Diet: Dietary Nutrition Supplements: Other Oral Supplement (see comment)  DIET GENERAL; Dysphagia Soft and Bite-Sized  Dietary Nutrition Supplements: Standard High Calorie Oral Supplement  Code Status: DNR-CC    PT/OT Eval Status: done    Dispo -

## 2020-06-03 PROCEDURE — 97110 THERAPEUTIC EXERCISES: CPT

## 2020-06-03 PROCEDURE — 2580000003 HC RX 258: Performed by: INTERNAL MEDICINE

## 2020-06-03 PROCEDURE — 6360000002 HC RX W HCPCS: Performed by: INTERNAL MEDICINE

## 2020-06-03 PROCEDURE — 6370000000 HC RX 637 (ALT 250 FOR IP): Performed by: INTERNAL MEDICINE

## 2020-06-03 PROCEDURE — 6370000000 HC RX 637 (ALT 250 FOR IP): Performed by: ANESTHESIOLOGY

## 2020-06-03 PROCEDURE — 97530 THERAPEUTIC ACTIVITIES: CPT

## 2020-06-03 PROCEDURE — 1200000002 HC SEMI PRIVATE SWING BED

## 2020-06-03 PROCEDURE — 97116 GAIT TRAINING THERAPY: CPT

## 2020-06-03 RX ADMIN — OXYCODONE HYDROCHLORIDE 10 MG: 5 TABLET ORAL at 09:44

## 2020-06-03 RX ADMIN — PIPERACILLIN AND TAZOBACTAM 3.38 G: 3; .375 INJECTION, POWDER, LYOPHILIZED, FOR SOLUTION INTRAVENOUS at 05:45

## 2020-06-03 RX ADMIN — Medication 10 ML: at 22:08

## 2020-06-03 RX ADMIN — DOCUSATE SODIUM AND SENNOSIDES 2 TABLET: 50; 8.6 TABLET, FILM COATED ORAL at 08:32

## 2020-06-03 RX ADMIN — OXYCODONE HYDROCHLORIDE 10 MG: 5 TABLET ORAL at 01:44

## 2020-06-03 RX ADMIN — FAMOTIDINE 20 MG: 20 TABLET ORAL at 19:35

## 2020-06-03 RX ADMIN — Medication 10 ML: at 13:46

## 2020-06-03 RX ADMIN — ACETAMINOPHEN 650 MG: 325 TABLET ORAL at 08:32

## 2020-06-03 RX ADMIN — GABAPENTIN 200 MG: 100 CAPSULE ORAL at 08:33

## 2020-06-03 RX ADMIN — DOCUSATE SODIUM AND SENNOSIDES 2 TABLET: 50; 8.6 TABLET, FILM COATED ORAL at 19:35

## 2020-06-03 RX ADMIN — FUROSEMIDE 20 MG: 20 TABLET ORAL at 08:32

## 2020-06-03 RX ADMIN — OXYCODONE HYDROCHLORIDE 10 MG: 5 TABLET ORAL at 13:45

## 2020-06-03 RX ADMIN — LEVOTHYROXINE SODIUM 75 MCG: 75 TABLET ORAL at 05:45

## 2020-06-03 RX ADMIN — ACETAMINOPHEN 650 MG: 325 TABLET ORAL at 13:45

## 2020-06-03 RX ADMIN — ACETAMINOPHEN 650 MG: 325 TABLET ORAL at 17:52

## 2020-06-03 RX ADMIN — PIPERACILLIN AND TAZOBACTAM 3.38 G: 3; .375 INJECTION, POWDER, LYOPHILIZED, FOR SOLUTION INTRAVENOUS at 21:47

## 2020-06-03 RX ADMIN — OXYCODONE HYDROCHLORIDE 10 MG: 5 TABLET ORAL at 21:47

## 2020-06-03 RX ADMIN — ACETAMINOPHEN 650 MG: 325 TABLET ORAL at 19:35

## 2020-06-03 RX ADMIN — ENOXAPARIN SODIUM 40 MG: 40 INJECTION SUBCUTANEOUS at 08:32

## 2020-06-03 RX ADMIN — FAMOTIDINE 20 MG: 20 TABLET ORAL at 08:32

## 2020-06-03 RX ADMIN — MELOXICAM 7.5 MG: 7.5 TABLET ORAL at 08:32

## 2020-06-03 RX ADMIN — OXYCODONE HYDROCHLORIDE 10 MG: 5 TABLET ORAL at 17:52

## 2020-06-03 RX ADMIN — OXYCODONE HYDROCHLORIDE 10 MG: 5 TABLET ORAL at 05:45

## 2020-06-03 RX ADMIN — GABAPENTIN 200 MG: 100 CAPSULE ORAL at 19:35

## 2020-06-03 RX ADMIN — PIPERACILLIN AND TAZOBACTAM 3.38 G: 3; .375 INJECTION, POWDER, LYOPHILIZED, FOR SOLUTION INTRAVENOUS at 13:47

## 2020-06-03 RX ADMIN — GABAPENTIN 200 MG: 100 CAPSULE ORAL at 13:45

## 2020-06-03 ASSESSMENT — PAIN DESCRIPTION - PROGRESSION
CLINICAL_PROGRESSION: GRADUALLY WORSENING

## 2020-06-03 ASSESSMENT — PAIN SCALES - GENERAL
PAINLEVEL_OUTOF10: 7
PAINLEVEL_OUTOF10: 8
PAINLEVEL_OUTOF10: 9
PAINLEVEL_OUTOF10: 6
PAINLEVEL_OUTOF10: 8
PAINLEVEL_OUTOF10: 8
PAINLEVEL_OUTOF10: 6
PAINLEVEL_OUTOF10: 9
PAINLEVEL_OUTOF10: 9
PAINLEVEL_OUTOF10: 8

## 2020-06-03 ASSESSMENT — PAIN DESCRIPTION - ORIENTATION: ORIENTATION: LEFT

## 2020-06-03 ASSESSMENT — PAIN DESCRIPTION - LOCATION: LOCATION: CHEST;LEG

## 2020-06-03 ASSESSMENT — PAIN DESCRIPTION - PAIN TYPE: TYPE: ACUTE PAIN

## 2020-06-03 NOTE — PROGRESS NOTES
Physical Therapy  Facility/Department: [facility] MED SURG UNIT  Daily Treatment Note -PM tx   NAME: Shant Adams  : 1960  MRN: [id number]    Date of Service: 6/3/2020    Discharge Recommendations:  Home with assist PRN, Home with Home health PT        Assessment   Body structures, Functions, Activity limitations: Decreased endurance;Decreased functional mobility ; Decreased strength; Increased pain  Assessment: Pt able to ambulate and work on forward and backward walking. Pt has a difficult time clearing his foot from the ground during retro gait and pts slippers tended to scuf the floor. Pt took very small steps with retro. Pt then completed standing ther ex and after a short rest break complete seated ther ex. Pt did well with proper form with ther ex. Pt able to progress to standing ther ex. Treatment Diagnosis: weakness with decreased fucn tinoal mobiltiy independence due to cellulits chest wall  Prognosis: Good  REQUIRES PT FOLLOW UP: Yes     Patient Diagnosis(es): cellulitis chest wall    has a past medical history of Hepatitis B, Hepatitis C, Hypothyroid, and Other specified disorders of brain. has no past surgical history on file. Restrictions  Restrictions/Precautions  Restrictions/Precautions: Fall Risk  Required Braces or Orthoses?: No  Subjective   General  Chart Reviewed:  Yes  Additional Pertinent Hx: Trach for at least 5 years, now iwth cellulitis of chest wall/pain and  need for antibiotics for 6 weeks  Family / Caregiver Present: No  Referring Practitioner: Dr Rupinder Garnett: Pt reports having 6/10 left knee and left upper chest/neck pain but agreeablet work with PT   Pain Screening  Patient Currently in Pain: Yes  Pain Assessment  Pain Assessment: 0-10  Pain Level: 6  Pain Type: Acute pain  Pain Location: Chest;Leg  Pain Orientation: Left  Vital Signs  Patient Currently in Pain: Yes  Pre Treatment Pain Screening  Pain at present: 0  Scale Used: Numeric Score  Intervention List: Strengthening, Functional Mobility Training, Transfer Training, Balance Training, Endurance Training, Stair training, Gait Training, Pain Management, Patient/Caregiver Education & Training, Safety Education & Training, Manual Therapy - Joint Manipulation, Home Exercise Program, Equipment Evaluation, Education, & procurement, Positioning  Plan Comment: Cont. POC  Safety Devices  Type of devices:  All fall risk precautions in place, Call light within reach, Gait belt, Left in chair     Therapy Time   Individual Concurrent Group Co-treatment   Time In  2:05pm         Time Out  2:50pm         Minutes  45min                 Rubén Millan PT  License and [address] Number: [id number]

## 2020-06-03 NOTE — PROGRESS NOTES
Physical Therapy   Subacute Daily Treatment Note  NAME: Shant Adams  : 1960  MRN: [id number]    Date of Service: 6/3/2020    Patient Diagnosis(es):   Patient Active Problem List    Diagnosis Date Noted    Therapeutic opioid-induced constipation (OIC) 2020    Streptococcal bacteremia     Chest wall pain 2020    Neck pain on left side 2020    Septic arthritis of AC joint ([facility] [address] 2020    Thigh pain, musculoskeletal, left 2020    Neuropathic pain of thigh, left 2020    Cellulitis 2020    Frequent falls 2020    Weakness 2020    Laryngeal carcinoma ([facility] [address] 2020    Gastroesophageal reflux 2020       Past Medical History:   Diagnosis Date    Hepatitis B     Hepatitis C     Hypothyroid     Other specified disorders of brain     diagnosed with Wet Brain in      History reviewed. No pertinent surgical history. Restrictions  Restrictions/Precautions  Restrictions/Precautions: Fall Risk  Required Braces or Orthoses?: No  Subjective       Objective    Pt present for Interdisciplinary Care conference this date. See separate note for full report. Educated status and progress. Assessment     Pt gaining in strength, endurance, and function with therapy. Will continue to advance as tolerated along with IV needs.            Discharge Recommendations:  Home with assist PRN, Home with Home health PT    Goals  Short term goals  Time Frame for Short term goals: 1-2 weeks  Short term goal 1: transfer sit to stand with SBA and <1-2 vc for safety  Short term goal 2: bed mobility with <= SBA and increased time  Short term goal 3: amb >= 50ft before fatigued with CGA and ww  Short term goal 4: assess curb step wtih ww and Min A of 1  Long term goals  Time Frame for Long term goals : 6 weeks or length of IV needs   Long term goal 1: Modified independent bed mobility  Long term goal 2: Modified independent transfers with least AD  Long term goal 3:

## 2020-06-03 NOTE — PROGRESS NOTES
Nutrition Assessment    Type and Reason for Visit: Reassess    Nutrition Recommendations: Continue current diet, Continue current ONS    Nutrition Assessment: Pt remains stable from nutrition stand point. Shows fair-good intake & is enjoying supplements. He has no nutrition concerns at this time. No new nutrition risks identified at this time. Will continue to follow for LOS. Nutrition Risk Level: Low    Nutrient Needs:  · Estimated Daily Total Kcal: 8034-0243 @ 32-35 kcal/kg  · Estimated Daily Protein (g): 74-87 @ 1.2-1.4 gr/kg  · Estimated Daily Total Fluid (ml/day): 1860 ml/d    Nutrition Diagnosis:   · Problem: Moderate malnutrition, In context of chronic illness  · Etiology: related to Difficulty swallowing, Insufficient energy/nutrient consumption     Signs and symptoms:  as evidenced by Diet history of poor intake, Mild muscle loss, Mild loss of subcutaneous fat    Objective Information:  · Nutrition-Focused Physical Findings: No edema. Last BM 5/29. · Wound Type: None  · Current Nutrition Therapies:  · Oral Diet Orders: General, Dysphagia  Soft and Bite-Sized (Dysphagia 3)(AMA signed for mildly thickened liquids. SLT aware pt didn't follow prior to admit.)   · Oral Diet intake: 51-75%, %  · Oral Nutrition Supplement (ONS) Orders: Standard High Calorie Oral Supplement, Other Oral Supplement (See Comment)(Boost pudding with banana.)  · ONS intake: %  · Anthropometric Measures:  · Ht: 5' 5\" (165.1 cm)   · Current Body Wt: (n/a)  · Admission Body Wt: 140 lb 3.4 oz (63.6 kg)  · Usual Body Wt: (n/a, pt denies weight loss)  · % Weight Change:  ,  Pt denies. no wt hx available in EMR.   · Ideal Body Wt: 136 lb 3.9 oz (61.8 kg), % Ideal Body 103%  · BMI Classification: BMI 18.5 - 24.9 Normal Weight    Nutrition Interventions:   Continue current diet, Continue current ONS  Continued Inpatient Monitoring    Nutrition Evaluation:   · Evaluation: Progressing toward goals   · Goals: Intake > 75% meals & ONS. Maintain weight >/= current weight.     · Monitoring: Meal Intake, Supplement Intake, Weight, Pertinent Labs, Monitor Bowel Function      Electronically signed by Dre Mckeon RD, LD on 6/3/20 at 3:21 PM EDT

## 2020-06-04 LAB
ANION GAP SERPL CALCULATED.3IONS-SCNC: 9 MEQ/L (ref 9–15)
BASOPHILS ABSOLUTE: 0.1 K/UL (ref 0–0.2)
BASOPHILS RELATIVE PERCENT: 1.6 %
BUN BLDV-MCNC: 9 MG/DL (ref 8–23)
CALCIUM SERPL-MCNC: 9.6 MG/DL (ref 8.5–9.9)
CHLORIDE BLD-SCNC: 102 MEQ/L (ref 95–107)
CO2: 28 MEQ/L (ref 20–31)
CREAT SERPL-MCNC: 0.57 MG/DL (ref 0.7–1.2)
EOSINOPHILS ABSOLUTE: 0.2 K/UL (ref 0–0.7)
EOSINOPHILS RELATIVE PERCENT: 5.5 %
GFR AFRICAN AMERICAN: >60
GFR NON-AFRICAN AMERICAN: >60
GLUCOSE BLD-MCNC: 94 MG/DL (ref 70–99)
HCT VFR BLD CALC: 34.8 % (ref 42–52)
HEMOGLOBIN: 11.2 G/DL (ref 14–18)
LYMPHOCYTES ABSOLUTE: 1.3 K/UL (ref 1–4.8)
LYMPHOCYTES RELATIVE PERCENT: 36.7 %
MCH RBC QN AUTO: 31.1 PG (ref 27–31.3)
MCHC RBC AUTO-ENTMCNC: 32.2 % (ref 33–37)
MCV RBC AUTO: 96.6 FL (ref 80–100)
MONOCYTES ABSOLUTE: 0.5 K/UL (ref 0.2–0.8)
MONOCYTES RELATIVE PERCENT: 15.2 %
NEUTROPHILS ABSOLUTE: 1.5 K/UL (ref 1.4–6.5)
NEUTROPHILS RELATIVE PERCENT: 41 %
PDW BLD-RTO: 14.3 % (ref 11.5–14.5)
PLATELET # BLD: 406 K/UL (ref 130–400)
POTASSIUM SERPL-SCNC: 4 MEQ/L (ref 3.4–4.9)
RBC # BLD: 3.6 M/UL (ref 4.7–6.1)
SODIUM BLD-SCNC: 139 MEQ/L (ref 135–144)
WBC # BLD: 3.6 K/UL (ref 4.8–10.8)

## 2020-06-04 PROCEDURE — 85025 COMPLETE CBC W/AUTO DIFF WBC: CPT

## 2020-06-04 PROCEDURE — 6370000000 HC RX 637 (ALT 250 FOR IP): Performed by: ANESTHESIOLOGY

## 2020-06-04 PROCEDURE — 97110 THERAPEUTIC EXERCISES: CPT

## 2020-06-04 PROCEDURE — 6370000000 HC RX 637 (ALT 250 FOR IP): Performed by: INTERNAL MEDICINE

## 2020-06-04 PROCEDURE — 80048 BASIC METABOLIC PNL TOTAL CA: CPT

## 2020-06-04 PROCEDURE — 97530 THERAPEUTIC ACTIVITIES: CPT

## 2020-06-04 PROCEDURE — 2580000003 HC RX 258: Performed by: INTERNAL MEDICINE

## 2020-06-04 PROCEDURE — 36592 COLLECT BLOOD FROM PICC: CPT

## 2020-06-04 PROCEDURE — 6360000002 HC RX W HCPCS: Performed by: INTERNAL MEDICINE

## 2020-06-04 PROCEDURE — 97116 GAIT TRAINING THERAPY: CPT

## 2020-06-04 PROCEDURE — 1200000002 HC SEMI PRIVATE SWING BED

## 2020-06-04 RX ORDER — FINASTERIDE 5 MG/1
5 TABLET, FILM COATED ORAL DAILY
Status: DISCONTINUED | OUTPATIENT
Start: 2020-06-04 | End: 2020-06-15 | Stop reason: HOSPADM

## 2020-06-04 RX ADMIN — FAMOTIDINE 20 MG: 20 TABLET ORAL at 08:44

## 2020-06-04 RX ADMIN — Medication 10 ML: at 21:36

## 2020-06-04 RX ADMIN — DOCUSATE SODIUM AND SENNOSIDES 2 TABLET: 50; 8.6 TABLET, FILM COATED ORAL at 21:35

## 2020-06-04 RX ADMIN — OXYCODONE HYDROCHLORIDE 10 MG: 5 TABLET ORAL at 05:32

## 2020-06-04 RX ADMIN — ACETAMINOPHEN 650 MG: 325 TABLET ORAL at 08:45

## 2020-06-04 RX ADMIN — OXYCODONE HYDROCHLORIDE 10 MG: 5 TABLET ORAL at 13:24

## 2020-06-04 RX ADMIN — GABAPENTIN 200 MG: 100 CAPSULE ORAL at 08:45

## 2020-06-04 RX ADMIN — OXYCODONE HYDROCHLORIDE 10 MG: 5 TABLET ORAL at 21:35

## 2020-06-04 RX ADMIN — ACETAMINOPHEN 650 MG: 325 TABLET ORAL at 13:24

## 2020-06-04 RX ADMIN — ACETAMINOPHEN 650 MG: 325 TABLET ORAL at 21:35

## 2020-06-04 RX ADMIN — MELOXICAM 7.5 MG: 7.5 TABLET ORAL at 08:45

## 2020-06-04 RX ADMIN — ACETAMINOPHEN 650 MG: 325 TABLET ORAL at 17:31

## 2020-06-04 RX ADMIN — Medication 10 ML: at 13:23

## 2020-06-04 RX ADMIN — PIPERACILLIN AND TAZOBACTAM 3.38 G: 3; .375 INJECTION, POWDER, LYOPHILIZED, FOR SOLUTION INTRAVENOUS at 13:22

## 2020-06-04 RX ADMIN — DOCUSATE SODIUM AND SENNOSIDES 2 TABLET: 50; 8.6 TABLET, FILM COATED ORAL at 08:44

## 2020-06-04 RX ADMIN — ENOXAPARIN SODIUM 40 MG: 40 INJECTION SUBCUTANEOUS at 08:44

## 2020-06-04 RX ADMIN — PIPERACILLIN AND TAZOBACTAM 3.38 G: 3; .375 INJECTION, POWDER, LYOPHILIZED, FOR SOLUTION INTRAVENOUS at 21:33

## 2020-06-04 RX ADMIN — FAMOTIDINE 20 MG: 20 TABLET ORAL at 21:35

## 2020-06-04 RX ADMIN — OXYCODONE HYDROCHLORIDE 10 MG: 5 TABLET ORAL at 17:31

## 2020-06-04 RX ADMIN — PIPERACILLIN AND TAZOBACTAM 3.38 G: 3; .375 INJECTION, POWDER, LYOPHILIZED, FOR SOLUTION INTRAVENOUS at 05:31

## 2020-06-04 RX ADMIN — GABAPENTIN 200 MG: 100 CAPSULE ORAL at 21:35

## 2020-06-04 RX ADMIN — GABAPENTIN 200 MG: 100 CAPSULE ORAL at 13:24

## 2020-06-04 RX ADMIN — FINASTERIDE 5 MG: 5 TABLET, FILM COATED ORAL at 08:50

## 2020-06-04 RX ADMIN — OXYCODONE HYDROCHLORIDE 10 MG: 5 TABLET ORAL at 01:50

## 2020-06-04 RX ADMIN — FUROSEMIDE 20 MG: 20 TABLET ORAL at 08:44

## 2020-06-04 RX ADMIN — LEVOTHYROXINE SODIUM 75 MCG: 75 TABLET ORAL at 05:32

## 2020-06-04 RX ADMIN — OXYCODONE HYDROCHLORIDE 10 MG: 5 TABLET ORAL at 09:27

## 2020-06-04 ASSESSMENT — PAIN SCALES - GENERAL
PAINLEVEL_OUTOF10: 4
PAINLEVEL_OUTOF10: 7
PAINLEVEL_OUTOF10: 3
PAINLEVEL_OUTOF10: 8
PAINLEVEL_OUTOF10: 8
PAINLEVEL_OUTOF10: 9
PAINLEVEL_OUTOF10: 9
PAINLEVEL_OUTOF10: 8
PAINLEVEL_OUTOF10: 7

## 2020-06-04 ASSESSMENT — PAIN DESCRIPTION - LOCATION: LOCATION: SHOULDER

## 2020-06-04 ASSESSMENT — PAIN DESCRIPTION - INTENSITY: RATING_2: 6

## 2020-06-04 ASSESSMENT — PAIN DESCRIPTION - PROGRESSION
CLINICAL_PROGRESSION: GRADUALLY WORSENING

## 2020-06-04 ASSESSMENT — PAIN DESCRIPTION - PAIN TYPE: TYPE: CHRONIC PAIN

## 2020-06-04 ASSESSMENT — PAIN DESCRIPTION - ORIENTATION: ORIENTATION: LEFT

## 2020-06-04 NOTE — PROGRESS NOTES
assistance  Ambulation  Ambulation?: Yes  More Ambulation?: Yes  Ambulation 1  Surface: level tile  Device: Rolling Walker  Assistance: Stand by assistance  Quality of Gait: slow pacing, shuffling smaller step length  Distance: 80'   Ambulation 2  Surface - 2: level tile  Device 2: Rollator  Assistance 2: Stand by assistance;Contact guard assistance  Quality of Gait 2: slow pacing, pt able to brake to slow AD, shuffling gait  Distance: 80'   Stairs/Curb  Stairs?: Yes  Stairs  # Steps : 5  Stairs Height: (3 4'' and 2 6'' )  Rails: Bilateral  Assistance: Stand by assistance;Contact guard assistance  Comment: VC's for sequencing with pt performing non-reciprocally due to L knee pain, good follow through, inc time to complete         Exercises  Hip Flexion: x 20 BLE's   Hip Abduction: seated x 25 BLE's   Knee Long Arc Quad: x 20 H3'' BLE's                         G-Code     OutComes Score                                                     AM-PAC Score             Goals  Short term goals  Time Frame for Short term goals: 1-2 weeks  Short term goal 1: transfer sit to stand with SBA and <1-2 vc for safety  Short term goal 2: bed mobility with <= SBA and increased time  Short term goal 3: amb >= 50ft before fatigued with CGA and ww  Short term goal 4: assess curb step wtih ww and Min A of 1  Long term goals  Time Frame for Long term goals : 6 weeks or length of IV needs   Long term goal 1: Modified independent bed mobility  Long term goal 2: Modified independent transfers with least AD  Long term goal 3: amb >=100ft with least AD modified independent  Long term goal 4: 1 curb step with AD and SBA to enter/exit home  Long term goal 5: indep with HEP to improve LE strength,   Patient Goals   Patient goals : \" Get stronger to go home. \" via mouthing words    Plan    Plan  Times per week: 5-7x week  Times per day: (1-2)  Plan weeks: 6 weeks or legnth of IVs   Current Treatment Recommendations: Strengthening, Functional Mobility

## 2020-06-04 NOTE — PROGRESS NOTES
reps BUE for forward and backward circles  Horizontal ABduction: 1x30 reps BUE  Horizontal ADduction: 1x30 reps BUE  Elbow Flexion: 1x30 reps BUE  Elbow Extension: 1x30 reps BUE for chest presses  Other: to increase strength/end for ADL. Some ther ex performed in stance, some seated(rest break between each set)                    Plan   Plan  Times per week: 3-6 xs/week  Times per day: Daily  Specific instructions for Next Treatment: full ADL  Current Treatment Recommendations: Strengthening, ROM, Balance Training, Functional Mobility Training, Endurance Training, Pain Management, Safety Education & Training, Patient/Caregiver Education & Training, Equipment Evaluation, Education, & procurement, Positioning, Home Management Training, Self-Care / ADL  Plan Comment: Pt. requires skilled OT intervention services needed to increase balance, strength, and endurance needed to increase safety and independence with ADLs, transfers, and functional mobility. Goals  Short term goals  Time Frame for Short term goals: 10 days to 3 weeks or length of IV  Short term goal 1: same as LTG  Short term goal 2: same as LTG  Short term goal 3: same as LTG  Short term goal 4: same as LTG  Short term goal 5: same as LTG  Long term goals  Time Frame for Long term goals : 10 days to 3 weeks or length of IV  Long term goal 1: Pt. will complete UB/LB bathing at mod I level. Long term goal 2: Pt. will complete UB/LB dressing at mod I level. Long term goal 3: Pt. will complete functional transfers at mod I level. Long term goal 4: Pt. will demonstrate good dynamic standing balance during ADL completion with mod I.  Long term goal 5: Pt. will complete BUE strengthening/ROM exercises needed for transfers and ADLs.   Patient Goals   Patient goals : to get better       Therapy Time   Individual Concurrent Group Co-treatment   Time In  1:45         Time Out  2:15         Minutes  [id number] [place], [place]

## 2020-06-04 NOTE — PROGRESS NOTES
Hospitalist Progress Note      PCP: No primary care provider on file. Date of Admission: 5/19/2020    Chief Complaint: pain    Subjective: pt eating breakfast, looks comfortable     Medications:  Reviewed    Infusion Medications   Scheduled Medications    sennosides-docusate sodium  2 tablet Oral BID    piperacillin-tazobactam  3.375 g Intravenous 3 times per day    furosemide  20 mg Oral Daily    sodium chloride flush  10 mL Intravenous 2 times per day    polyethylene glycol  17 g Oral Daily    fentaNYL  1 patch Transdermal Q72H    gabapentin  200 mg Oral TID    meloxicam  7.5 mg Oral Daily    acetaminophen  650 mg Oral 4x Daily    enoxaparin  40 mg Subcutaneous Daily    famotidine  20 mg Oral BID    levothyroxine  75 mcg Oral Daily     PRN Meds: methylnaltrexone, sodium chloride flush, magnesium hydroxide, promethazine **OR** ondansetron, lidocaine, oxyCODONE **OR** oxyCODONE      Intake/Output Summary (Last 24 hours) at 6/4/2020 0753  Last data filed at 6/4/2020 0538  Gross per 24 hour   Intake 200 ml   Output 1300 ml   Net -1100 ml       Exam:    BP (!) 104/53   Pulse 73   Temp 98 °F (36.7 °C) (Oral)   Resp 18   Ht 5' 5\" (1.651 m)   Wt 140 lb 3.2 oz (63.6 kg)   SpO2 96%   BMI 23.33 kg/m²     General appearance: No apparent distress, appears stated age and cooperative. HEENT: Pupils equal, round, and reactive to light. Conjunctivae/corneas clear. Neck: Supple, with full range of motion. No jugular venous distention. Trachea midline. Respiratory:  Normal respiratory effort. Clear to auscultation, bilaterally without Rales/Wheezes/Rhonchi. Cardiovascular: Regular rate and rhythm with normal S1/S2 without murmurs, rubs or gallops. Abdomen: Soft, non-tender, non-distended with normal bowel sounds. Musculoskeletal: No clubbing, cyanosis or edema bilaterally. Full range of motion without deformity. Skin: Skin color, texture, turgor normal.  No rashes or lesions.   Neurologic:

## 2020-06-05 PROCEDURE — 6370000000 HC RX 637 (ALT 250 FOR IP): Performed by: ANESTHESIOLOGY

## 2020-06-05 PROCEDURE — 6370000000 HC RX 637 (ALT 250 FOR IP): Performed by: INTERNAL MEDICINE

## 2020-06-05 PROCEDURE — 97530 THERAPEUTIC ACTIVITIES: CPT

## 2020-06-05 PROCEDURE — 2580000003 HC RX 258: Performed by: INTERNAL MEDICINE

## 2020-06-05 PROCEDURE — 97116 GAIT TRAINING THERAPY: CPT

## 2020-06-05 PROCEDURE — 6360000002 HC RX W HCPCS: Performed by: INTERNAL MEDICINE

## 2020-06-05 PROCEDURE — 1200000002 HC SEMI PRIVATE SWING BED

## 2020-06-05 PROCEDURE — 97110 THERAPEUTIC EXERCISES: CPT

## 2020-06-05 RX ADMIN — ENOXAPARIN SODIUM 40 MG: 40 INJECTION SUBCUTANEOUS at 07:48

## 2020-06-05 RX ADMIN — MELOXICAM 7.5 MG: 7.5 TABLET ORAL at 07:50

## 2020-06-05 RX ADMIN — DOCUSATE SODIUM AND SENNOSIDES 2 TABLET: 50; 8.6 TABLET, FILM COATED ORAL at 21:49

## 2020-06-05 RX ADMIN — ACETAMINOPHEN 650 MG: 325 TABLET ORAL at 13:37

## 2020-06-05 RX ADMIN — OXYCODONE HYDROCHLORIDE 10 MG: 5 TABLET ORAL at 21:49

## 2020-06-05 RX ADMIN — GABAPENTIN 200 MG: 100 CAPSULE ORAL at 07:50

## 2020-06-05 RX ADMIN — PIPERACILLIN AND TAZOBACTAM 3.38 G: 3; .375 INJECTION, POWDER, LYOPHILIZED, FOR SOLUTION INTRAVENOUS at 21:49

## 2020-06-05 RX ADMIN — GABAPENTIN 200 MG: 100 CAPSULE ORAL at 21:49

## 2020-06-05 RX ADMIN — FINASTERIDE 5 MG: 5 TABLET, FILM COATED ORAL at 07:50

## 2020-06-05 RX ADMIN — ACETAMINOPHEN 650 MG: 325 TABLET ORAL at 07:48

## 2020-06-05 RX ADMIN — Medication 10 ML: at 22:01

## 2020-06-05 RX ADMIN — OXYCODONE HYDROCHLORIDE 10 MG: 5 TABLET ORAL at 01:34

## 2020-06-05 RX ADMIN — ACETAMINOPHEN 650 MG: 325 TABLET ORAL at 21:49

## 2020-06-05 RX ADMIN — OXYCODONE HYDROCHLORIDE 10 MG: 5 TABLET ORAL at 09:26

## 2020-06-05 RX ADMIN — Medication 10 ML: at 09:58

## 2020-06-05 RX ADMIN — ACETAMINOPHEN 650 MG: 325 TABLET ORAL at 17:22

## 2020-06-05 RX ADMIN — PIPERACILLIN AND TAZOBACTAM 3.38 G: 3; .375 INJECTION, POWDER, LYOPHILIZED, FOR SOLUTION INTRAVENOUS at 05:30

## 2020-06-05 RX ADMIN — GABAPENTIN 200 MG: 100 CAPSULE ORAL at 13:37

## 2020-06-05 RX ADMIN — OXYCODONE HYDROCHLORIDE 10 MG: 5 TABLET ORAL at 13:37

## 2020-06-05 RX ADMIN — FUROSEMIDE 20 MG: 20 TABLET ORAL at 07:48

## 2020-06-05 RX ADMIN — PIPERACILLIN AND TAZOBACTAM 3.38 G: 3; .375 INJECTION, POWDER, LYOPHILIZED, FOR SOLUTION INTRAVENOUS at 13:53

## 2020-06-05 RX ADMIN — LEVOTHYROXINE SODIUM 75 MCG: 75 TABLET ORAL at 05:26

## 2020-06-05 RX ADMIN — FAMOTIDINE 20 MG: 20 TABLET ORAL at 21:49

## 2020-06-05 RX ADMIN — FAMOTIDINE 20 MG: 20 TABLET ORAL at 07:50

## 2020-06-05 RX ADMIN — OXYCODONE HYDROCHLORIDE 10 MG: 5 TABLET ORAL at 17:22

## 2020-06-05 RX ADMIN — OXYCODONE HYDROCHLORIDE 10 MG: 5 TABLET ORAL at 05:26

## 2020-06-05 ASSESSMENT — PAIN SCALES - GENERAL
PAINLEVEL_OUTOF10: 6
PAINLEVEL_OUTOF10: 6
PAINLEVEL_OUTOF10: 0
PAINLEVEL_OUTOF10: 6
PAINLEVEL_OUTOF10: 7
PAINLEVEL_OUTOF10: 4
PAINLEVEL_OUTOF10: 2
PAINLEVEL_OUTOF10: 6
PAINLEVEL_OUTOF10: 7
PAINLEVEL_OUTOF10: 7
PAINLEVEL_OUTOF10: 6
PAINLEVEL_OUTOF10: 7

## 2020-06-05 ASSESSMENT — PAIN DESCRIPTION - ORIENTATION
ORIENTATION_2: LEFT
ORIENTATION: LEFT

## 2020-06-05 ASSESSMENT — PAIN DESCRIPTION - PAIN TYPE
TYPE: CHRONIC PAIN
TYPE_2: CHRONIC PAIN
TYPE: CHRONIC PAIN

## 2020-06-05 ASSESSMENT — PAIN DESCRIPTION - LOCATION
LOCATION: SHOULDER
LOCATION_2: LEG
LOCATION: SHOULDER;KNEE

## 2020-06-05 ASSESSMENT — PAIN DESCRIPTION - INTENSITY: RATING_2: 8

## 2020-06-05 NOTE — PLAN OF CARE
Problem: Pain:  Description: Pain management should include both nonpharmacologic and pharmacologic interventions.   Goal: Pain level will decrease  Description: Pain level will decrease  Outcome: Ongoing  Goal: Control of acute pain  Description: Control of acute pain  Outcome: Ongoing  Goal: Control of chronic pain  Description: Control of chronic pain  Outcome: Ongoing     Problem: Falls - Risk of:  Goal: Will remain free from falls  Description: Will remain free from falls  Outcome: Met This Shift  Goal: Absence of physical injury  Description: Absence of physical injury  Outcome: Met This Shift     Problem: DAILY CARE  Goal: Daily care needs are met  Outcome: Met This Shift     Problem: SKIN INTEGRITY  Goal: Skin integrity is maintained or improved  Outcome: Met This Shift     Problem: Nutrition  Goal: Optimal nutrition therapy  Outcome: Met This Shift

## 2020-06-05 NOTE — PROGRESS NOTES
Occupational Therapy  Facility/Department: [facility] MED SURG UNIT  Daily Treatment Note  NAME: Roberto Chambers  : 1960  MRN: [id number]    Date of Service: 2020    Discharge Recommendations:  Home with Home health OT, Home with assist PRN       Assessment   Performance deficits / Impairments: Decreased functional mobility ; Decreased ADL status; Decreased ROM; Decreased strength;Decreased endurance;Decreased sensation;Decreased balance  Assessment: Pt informed he feels more comfortable and safe using the RW vs the Rollator because he feels the Rollator moves too fast. Therefore, went back to using the RW. Pt SBA fxl mob. Pt practiced navigating up set of two 6\" steps and down set of three 4\" steps and then up set of three 4\" steps and down the two 6\" steps using bilat handrails for both- stairs in prep for d/c home. Prognosis: Good  REQUIRES OT FOLLOW UP: Yes         Patient Diagnosis(es): There were no encounter diagnoses. has a past medical history of Hepatitis B, Hepatitis C, Hypothyroid, and Other specified disorders of brain. has no past surgical history on file.     Restrictions  Restrictions/Precautions  Restrictions/Precautions: Fall Risk  Required Braces or Orthoses?: No  Subjective   General  Chart Reviewed: Yes  Patient assessed for rehabilitation services?: Yes  Family / Caregiver Present: No  Referring Practitioner: Kaity Layne MD  Subjective  Subjective: Pt agreeable to OT  General Comment  Comments: demos good activity toll and increase core stability   Pain Assessment  Pain Assessment: 0-10  Pain Type: Chronic pain  Pain Location: Shoulder  Pain Orientation: Left  Vital Signs  Patient Currently in Pain: Yes      Objective             Functional Mobility  Functional - Mobility Device: Rolling Walker  Activity: Other(2x level hallway surface)  Assist Level: Stand by assistance     Transfers  Sit to stand: Stand by assistance  Stand to sit: Stand by assistance         CGA- navigating up set of two 6\" steps and down set of three 4\" steps and then up set of three 4\" steps and down the two 6\" steps using bilat handrails for both                             Plan   Plan  Times per week: 3-6 xs/week  Times per day: Daily  Specific instructions for Next Treatment: full ADL  Current Treatment Recommendations: Strengthening, ROM, Balance Training, Functional Mobility Training, Endurance Training, Pain Management, Safety Education & Training, Patient/Caregiver Education & Training, Equipment Evaluation, Education, & procurement, Positioning, Home Management Training, Self-Care / ADL  Plan Comment: Pt. requires skilled OT intervention services needed to increase balance, strength, and endurance needed to increase safety and independence with ADLs, transfers, and functional mobility. Goals  Short term goals  Time Frame for Short term goals: 10 days to 3 weeks or length of IV  Short term goal 1: same as LTG  Short term goal 2: same as LTG  Short term goal 3: same as LTG  Short term goal 4: same as LTG  Short term goal 5: same as LTG  Long term goals  Time Frame for Long term goals : 10 days to 3 weeks or length of IV  Long term goal 1: Pt. will complete UB/LB bathing at mod I level. Long term goal 2: Pt. will complete UB/LB dressing at mod I level. Long term goal 3: Pt. will complete functional transfers at mod I level. Long term goal 4: Pt. will demonstrate good dynamic standing balance during ADL completion with mod I.  Long term goal 5: Pt. will complete BUE strengthening/ROM exercises needed for transfers and ADLs.   Patient Goals   Patient goals : to get better       Therapy Time   Individual Concurrent Group Co-treatment   Time In  3:00         Time Out  3:40         Minutes  [address]

## 2020-06-05 NOTE — PROGRESS NOTES
Orientation     Cognition      Objective      Transfers  Sit to Stand: Stand by assistance  Stand to sit: Stand by assistance  Ambulation  Ambulation?: Yes  Ambulation 1  Surface: level tile  Device: Rolling Walker  Assistance: Stand by assistance  Quality of Gait: slow pacing, shuffling steps, VC's to keep AD closer to inc posture  Distance: 80' x 2 with seated RB between trials   Stairs/Curb  Stairs?: Yes  Stairs  # Steps : 5  Stairs Height: (3 4'' and 2 6'' )  Rails: Bilateral  Assistance: Stand by assistance;Contact guard assistance  Comment: pt performed steps non-reciprocally with dec VC's for sequencing         Exercises  Hip Flexion: x 20 BLE's   Hip Abduction: seated x 20 hold 3 sec RTB   Knee Long Arc Quad: x 20 H3'' BLE's   Ankle Pumps: x20 H 3                         G-Code     OutComes Score                                                     AM-PAC Score             Goals  Short term goals  Time Frame for Short term goals: 1-2 weeks  Short term goal 1: transfer sit to stand with SBA and <1-2 vc for safety  Short term goal 2: bed mobility with <= SBA and increased time  Short term goal 3: amb >= 50ft before fatigued with CGA and ww  Short term goal 4: assess curb step wtih ww and Min A of 1  Long term goals  Time Frame for Long term goals : 6 weeks or length of IV needs   Long term goal 1: Modified independent bed mobility  Long term goal 2: Modified independent transfers with least AD  Long term goal 3: amb >=100ft with least AD modified independent  Long term goal 4: 1 curb step with AD and SBA to enter/exit home  Long term goal 5: indep with HEP to improve LE strength,   Patient Goals   Patient goals : \" Get stronger to go home. \" via mouthing words    Plan    Plan  Times per week: 5-7x week  Times per day: (1-2)  Plan weeks: 6 weeks or legnth of IVs   Current Treatment Recommendations: Strengthening, Functional Mobility Training, Transfer Training, Balance Training, Endurance Training, Stair training, Gait Training, Pain Management, Patient/Caregiver Education & Training, Safety Education & Training, Manual Therapy - Joint Manipulation, Home Exercise Program, Equipment Evaluation, Education, & procurement, Positioning  Plan Comment: Cont. POC  Safety Devices  Type of devices:  All fall risk precautions in place, Call light within reach, Gait belt, Left in chair     Therapy Time   Individual Concurrent Group Co-treatment   Time In  1400         Time Out  1440         Minutes  Adarsh Casillas, PTA  License and [address] Number: [id number]

## 2020-06-05 NOTE — PROGRESS NOTES
Hospitalist Progress Note      PCP: No primary care provider on file. Date of Admission: 5/19/2020    Chief Complaint: pain    Subjective: pt awake/alert, looks comfortable     Medications:  Reviewed    Infusion Medications   Scheduled Medications    finasteride  5 mg Oral Daily    sennosides-docusate sodium  2 tablet Oral BID    piperacillin-tazobactam  3.375 g Intravenous 3 times per day    furosemide  20 mg Oral Daily    sodium chloride flush  10 mL Intravenous 2 times per day    polyethylene glycol  17 g Oral Daily    fentaNYL  1 patch Transdermal Q72H    gabapentin  200 mg Oral TID    meloxicam  7.5 mg Oral Daily    acetaminophen  650 mg Oral 4x Daily    enoxaparin  40 mg Subcutaneous Daily    famotidine  20 mg Oral BID    levothyroxine  75 mcg Oral Daily     PRN Meds: methylnaltrexone, sodium chloride flush, magnesium hydroxide, promethazine **OR** ondansetron, lidocaine, oxyCODONE **OR** oxyCODONE      Intake/Output Summary (Last 24 hours) at 6/5/2020 0800  Last data filed at 6/4/2020 1013  Gross per 24 hour   Intake 120 ml   Output --   Net 120 ml       Exam:    BP (!) 130/59   Pulse 66   Temp 98.1 °F (36.7 °C) (Oral)   Resp 18   Ht 5' 5\" (1.651 m)   Wt 140 lb 3.2 oz (63.6 kg)   SpO2 98%   BMI 23.33 kg/m²     General appearance: No apparent distress, appears stated age and cooperative. HEENT: Pupils equal, round, and reactive to light. Conjunctivae/corneas clear. Neck: Supple, with full range of motion. No jugular venous distention. Trachea midline. Respiratory:  Normal respiratory effort. Clear to auscultation, bilaterally without Rales/Wheezes/Rhonchi. Cardiovascular: Regular rate and rhythm with normal S1/S2 without murmurs, rubs or gallops. Abdomen: Soft, non-tender, non-distended with normal bowel sounds. Musculoskeletal: No clubbing, cyanosis or edema bilaterally. Skin: Skin color, texture, turgor normal.  No rashes or lesions.   Neurologic:  Neurovascularly intact

## 2020-06-06 PROCEDURE — 2580000003 HC RX 258: Performed by: INTERNAL MEDICINE

## 2020-06-06 PROCEDURE — 6370000000 HC RX 637 (ALT 250 FOR IP): Performed by: ANESTHESIOLOGY

## 2020-06-06 PROCEDURE — 6370000000 HC RX 637 (ALT 250 FOR IP): Performed by: INTERNAL MEDICINE

## 2020-06-06 PROCEDURE — 1200000002 HC SEMI PRIVATE SWING BED

## 2020-06-06 PROCEDURE — 97110 THERAPEUTIC EXERCISES: CPT

## 2020-06-06 PROCEDURE — 97116 GAIT TRAINING THERAPY: CPT

## 2020-06-06 PROCEDURE — 6360000002 HC RX W HCPCS: Performed by: INTERNAL MEDICINE

## 2020-06-06 RX ADMIN — DOCUSATE SODIUM AND SENNOSIDES 2 TABLET: 50; 8.6 TABLET, FILM COATED ORAL at 22:41

## 2020-06-06 RX ADMIN — LEVOTHYROXINE SODIUM 75 MCG: 75 TABLET ORAL at 05:52

## 2020-06-06 RX ADMIN — PIPERACILLIN AND TAZOBACTAM 3.38 G: 3; .375 INJECTION, POWDER, LYOPHILIZED, FOR SOLUTION INTRAVENOUS at 05:52

## 2020-06-06 RX ADMIN — OXYCODONE HYDROCHLORIDE 10 MG: 5 TABLET ORAL at 05:52

## 2020-06-06 RX ADMIN — GABAPENTIN 200 MG: 100 CAPSULE ORAL at 22:40

## 2020-06-06 RX ADMIN — FINASTERIDE 5 MG: 5 TABLET, FILM COATED ORAL at 09:05

## 2020-06-06 RX ADMIN — ENOXAPARIN SODIUM 40 MG: 40 INJECTION SUBCUTANEOUS at 09:05

## 2020-06-06 RX ADMIN — OXYCODONE HYDROCHLORIDE 10 MG: 5 TABLET ORAL at 14:51

## 2020-06-06 RX ADMIN — GABAPENTIN 200 MG: 100 CAPSULE ORAL at 09:04

## 2020-06-06 RX ADMIN — OXYCODONE HYDROCHLORIDE 10 MG: 5 TABLET ORAL at 18:52

## 2020-06-06 RX ADMIN — PIPERACILLIN AND TAZOBACTAM 3.38 G: 3; .375 INJECTION, POWDER, LYOPHILIZED, FOR SOLUTION INTRAVENOUS at 22:40

## 2020-06-06 RX ADMIN — OXYCODONE HYDROCHLORIDE 10 MG: 5 TABLET ORAL at 10:00

## 2020-06-06 RX ADMIN — OXYCODONE HYDROCHLORIDE 10 MG: 5 TABLET ORAL at 22:40

## 2020-06-06 RX ADMIN — ACETAMINOPHEN 650 MG: 325 TABLET ORAL at 14:51

## 2020-06-06 RX ADMIN — Medication 10 ML: at 22:46

## 2020-06-06 RX ADMIN — PIPERACILLIN AND TAZOBACTAM 3.38 G: 3; .375 INJECTION, POWDER, LYOPHILIZED, FOR SOLUTION INTRAVENOUS at 14:53

## 2020-06-06 RX ADMIN — FAMOTIDINE 20 MG: 20 TABLET ORAL at 09:05

## 2020-06-06 RX ADMIN — DOCUSATE SODIUM AND SENNOSIDES 2 TABLET: 50; 8.6 TABLET, FILM COATED ORAL at 09:04

## 2020-06-06 RX ADMIN — FAMOTIDINE 20 MG: 20 TABLET ORAL at 22:41

## 2020-06-06 RX ADMIN — GABAPENTIN 200 MG: 100 CAPSULE ORAL at 14:51

## 2020-06-06 RX ADMIN — Medication 10 ML: at 09:12

## 2020-06-06 RX ADMIN — MELOXICAM 7.5 MG: 7.5 TABLET ORAL at 09:04

## 2020-06-06 RX ADMIN — ACETAMINOPHEN 650 MG: 325 TABLET ORAL at 17:35

## 2020-06-06 RX ADMIN — ACETAMINOPHEN 650 MG: 325 TABLET ORAL at 22:41

## 2020-06-06 RX ADMIN — ACETAMINOPHEN 650 MG: 325 TABLET ORAL at 09:04

## 2020-06-06 RX ADMIN — FUROSEMIDE 20 MG: 20 TABLET ORAL at 09:05

## 2020-06-06 RX ADMIN — OXYCODONE HYDROCHLORIDE 10 MG: 5 TABLET ORAL at 01:57

## 2020-06-06 ASSESSMENT — PAIN SCALES - GENERAL
PAINLEVEL_OUTOF10: 5
PAINLEVEL_OUTOF10: 0
PAINLEVEL_OUTOF10: 6
PAINLEVEL_OUTOF10: 7
PAINLEVEL_OUTOF10: 5
PAINLEVEL_OUTOF10: 7

## 2020-06-06 ASSESSMENT — PAIN SCALES - WONG BAKER: WONGBAKER_NUMERICALRESPONSE: 4

## 2020-06-06 NOTE — CONSULTS
This is a 60year old male whom I saw 5/16/20 for consult in [facility] he was transferred here for further treatment I was rewquest by the Staff to change the Angelique Trach tube. He is a S/P Total Laryngectomy at [facility] with post radiationa and Chemotherapy treatment    Mx; Trach tube with same Angelique Tube. Advise given. Will se patient PRN    Thank you. Neel Cristina MD          ENT/[facility]

## 2020-06-06 NOTE — PROGRESS NOTES
reach, Gait belt, Left in chair     Therapy Time   Individual Concurrent Group Co-treatment   Time In  1125         Time Out  1150         Minutes  [address], [place]  License and [address] Number: [id number]

## 2020-06-07 PROCEDURE — 1200000002 HC SEMI PRIVATE SWING BED

## 2020-06-07 PROCEDURE — 6370000000 HC RX 637 (ALT 250 FOR IP): Performed by: INTERNAL MEDICINE

## 2020-06-07 PROCEDURE — 6370000000 HC RX 637 (ALT 250 FOR IP): Performed by: ANESTHESIOLOGY

## 2020-06-07 PROCEDURE — 6360000002 HC RX W HCPCS: Performed by: INTERNAL MEDICINE

## 2020-06-07 PROCEDURE — 97116 GAIT TRAINING THERAPY: CPT

## 2020-06-07 PROCEDURE — 2580000003 HC RX 258: Performed by: INTERNAL MEDICINE

## 2020-06-07 PROCEDURE — 97110 THERAPEUTIC EXERCISES: CPT

## 2020-06-07 RX ORDER — ACETAMINOPHEN 325 MG/1
650 TABLET ORAL EVERY 4 HOURS PRN
Status: DISCONTINUED | OUTPATIENT
Start: 2020-06-07 | End: 2020-06-15 | Stop reason: HOSPADM

## 2020-06-07 RX ORDER — OXYCODONE HYDROCHLORIDE 5 MG/1
5 TABLET ORAL EVERY 4 HOURS PRN
Status: DISCONTINUED | OUTPATIENT
Start: 2020-06-07 | End: 2020-06-14

## 2020-06-07 RX ORDER — OXYCODONE HYDROCHLORIDE 5 MG/1
7.5 TABLET ORAL EVERY 4 HOURS PRN
Status: DISCONTINUED | OUTPATIENT
Start: 2020-06-07 | End: 2020-06-14

## 2020-06-07 RX ORDER — FUROSEMIDE 20 MG/1
10 TABLET ORAL DAILY
Status: DISCONTINUED | OUTPATIENT
Start: 2020-06-08 | End: 2020-06-08

## 2020-06-07 RX ORDER — GABAPENTIN 300 MG/1
300 CAPSULE ORAL 3 TIMES DAILY
Status: DISCONTINUED | OUTPATIENT
Start: 2020-06-07 | End: 2020-06-15 | Stop reason: HOSPADM

## 2020-06-07 RX ADMIN — FUROSEMIDE 20 MG: 20 TABLET ORAL at 08:20

## 2020-06-07 RX ADMIN — FAMOTIDINE 20 MG: 20 TABLET ORAL at 08:20

## 2020-06-07 RX ADMIN — OXYCODONE HYDROCHLORIDE 10 MG: 5 TABLET ORAL at 03:02

## 2020-06-07 RX ADMIN — PIPERACILLIN AND TAZOBACTAM 3.38 G: 3; .375 INJECTION, POWDER, LYOPHILIZED, FOR SOLUTION INTRAVENOUS at 06:07

## 2020-06-07 RX ADMIN — OXYCODONE HYDROCHLORIDE 7.5 MG: 5 TABLET ORAL at 14:56

## 2020-06-07 RX ADMIN — PIPERACILLIN AND TAZOBACTAM 3.38 G: 3; .375 INJECTION, POWDER, LYOPHILIZED, FOR SOLUTION INTRAVENOUS at 23:00

## 2020-06-07 RX ADMIN — OXYCODONE HYDROCHLORIDE 7.5 MG: 5 TABLET ORAL at 18:57

## 2020-06-07 RX ADMIN — GABAPENTIN 300 MG: 300 CAPSULE ORAL at 13:58

## 2020-06-07 RX ADMIN — Medication 10 ML: at 20:15

## 2020-06-07 RX ADMIN — OXYCODONE HYDROCHLORIDE 7.5 MG: 5 TABLET ORAL at 23:00

## 2020-06-07 RX ADMIN — FAMOTIDINE 20 MG: 20 TABLET ORAL at 20:25

## 2020-06-07 RX ADMIN — GABAPENTIN 300 MG: 300 CAPSULE ORAL at 08:20

## 2020-06-07 RX ADMIN — GABAPENTIN 300 MG: 300 CAPSULE ORAL at 20:25

## 2020-06-07 RX ADMIN — OXYCODONE HYDROCHLORIDE 10 MG: 5 TABLET ORAL at 06:56

## 2020-06-07 RX ADMIN — PIPERACILLIN AND TAZOBACTAM 3.38 G: 3; .375 INJECTION, POWDER, LYOPHILIZED, FOR SOLUTION INTRAVENOUS at 13:58

## 2020-06-07 RX ADMIN — OXYCODONE HYDROCHLORIDE 7.5 MG: 5 TABLET ORAL at 10:59

## 2020-06-07 RX ADMIN — MELOXICAM 7.5 MG: 7.5 TABLET ORAL at 08:20

## 2020-06-07 RX ADMIN — DOCUSATE SODIUM AND SENNOSIDES 2 TABLET: 50; 8.6 TABLET, FILM COATED ORAL at 20:25

## 2020-06-07 RX ADMIN — ENOXAPARIN SODIUM 40 MG: 40 INJECTION SUBCUTANEOUS at 08:20

## 2020-06-07 RX ADMIN — LEVOTHYROXINE SODIUM 75 MCG: 75 TABLET ORAL at 06:08

## 2020-06-07 RX ADMIN — DOCUSATE SODIUM AND SENNOSIDES 2 TABLET: 50; 8.6 TABLET, FILM COATED ORAL at 08:20

## 2020-06-07 RX ADMIN — FINASTERIDE 5 MG: 5 TABLET, FILM COATED ORAL at 08:20

## 2020-06-07 ASSESSMENT — ENCOUNTER SYMPTOMS
ALLERGIC/IMMUNOLOGIC NEGATIVE: 1
COLOR CHANGE: 1
VOICE CHANGE: 1
BACK PAIN: 0
RESPIRATORY NEGATIVE: 1
SINUS PRESSURE: 0
EYES NEGATIVE: 1
GASTROINTESTINAL NEGATIVE: 1
TROUBLE SWALLOWING: 1
SORE THROAT: 1

## 2020-06-07 ASSESSMENT — PAIN SCALES - GENERAL
PAINLEVEL_OUTOF10: 6
PAINLEVEL_OUTOF10: 6
PAINLEVEL_OUTOF10: 7
PAINLEVEL_OUTOF10: 6
PAINLEVEL_OUTOF10: 7
PAINLEVEL_OUTOF10: 5
PAINLEVEL_OUTOF10: 7
PAINLEVEL_OUTOF10: 0
PAINLEVEL_OUTOF10: 6
PAINLEVEL_OUTOF10: 7
PAINLEVEL_OUTOF10: 0
PAINLEVEL_OUTOF10: 5
PAINLEVEL_OUTOF10: 7
PAINLEVEL_OUTOF10: 7
PAINLEVEL_OUTOF10: 6
PAINLEVEL_OUTOF10: 7

## 2020-06-07 NOTE — PROGRESS NOTES
PROGRESS NOTE -PAIN MANAGEMENT   [facility] inpatient facility    SERVICE DATE:  6/7/2020   SERVICE TIME:  7:15 AM    CHIEFCOMPLAINT: Neck pain, left-sided upper chest wall pain      SUBJECTIVE:  Mr. Meño Wan is a 60 y.o. male [facility] for left sternoclavicular joint osteomyelitis and infection in the left side of the neck. Patient has history of laryngeal cancer, he has long time trach, trach was changed at [facility] several weeks ago started an aggressive antibiotic treatment for his severe osteomyelitis, he is responding very well.     Patient states  pain is well controlled on the provided treatment with oxycodone and Duragesic patch    PAIN  ASSESSMENT:    constant    aching and stabbing    pain is perceived as moderate (4-6 pain scale)      MEDICATIONS:    Current Facility-Administered Medications   Medication Dose Route Frequency Provider Last Rate Last Dose    oxyCODONE (ROXICODONE) immediate release tablet 7.5 mg  7.5 mg Oral Q4H PRN Cheryl Flor MD        Or    oxyCODONE (ROXICODONE) immediate release tablet 5 mg  5 mg Oral Q4H PRN Cheryl Flor MD        acetaminophen (TYLENOL) tablet 650 mg  650 mg Oral Q4H PRN Cheryl Flor MD        gabapentin (NEURONTIN) capsule 300 mg  300 mg Oral TID Cheryl Flor MD        finasteride (PROSCAR) tablet 5 mg  5 mg Oral Daily Arnold Escamilla MD   5 mg at 06/06/20 6565    sennosides-docusate sodium (SENOKOT-S) 8.6-50 MG tablet 2 tablet  2 tablet Oral BID Arnold Escamilla MD   2 tablet at 06/06/20 2241    piperacillin-tazobactam (ZOSYN) 3.375 g in dextrose 5 % 50 mL IVPB extended infusion (mini-bag)  3.375 g Intravenous 3 times per day Arnold Escamilla MD 12.5 mL/hr at 06/07/20 0607 3.375 g at 06/07/20 2509    furosemide (LASIX) tablet 20 mg  20 mg Oral Daily Zonia Monaco MD   20 mg at 06/06/20 0905    methylnaltrexone (RELISTOR) injection 12 mg  12 mg Subcutaneous Q48H PRN Cheryl Flor MD   12 mg at Allergic/Immunologic: Negative. Neurological: Negative. Hematological: Negative. Psychiatric/Behavioral: Positive for dysphoric mood. Negative for agitation, behavioral problems, confusion, decreased concentration, hallucinations, self-injury, sleep disturbance and suicidal ideas. The patient is not nervous/anxious and is not hyperactive. OBJECTIVE  PHYSICAL EXAM:  BP (!) 107/56   Pulse 66   Temp 98.4 °F (36.9 °C) (Oral)   Resp 18   Ht 5' 5\" (1.651 m)   Wt 140 lb 3.2 oz (63.6 kg)   SpO2 97%   BMI 23.33 kg/m²   Body mass index is 23.33 kg/m². CONSTITUTIONAL:  awake, alert, cooperative, no apparent distress, and appears stated age  ENT: Unchanged deformities left side of the neck, improved swelling on the left side of the neck  NECK: Slightly deformed skin on the left side of the neck, improved swelling and pain, and supple, symmetrical, trachea midline  LUNGS: No wheezes no rhonchi  CARDIOVASCULAR:  regular rate and rhythm  MUSCULOSKELETAL:  there is no redness, warmth, or swelling of the joints  full range of motion noted  motor strength is 5 out of 5 all extremities bilaterally  tone is normal  NEUROLOGIC: Neurologically stable, no neurological deficits  SKIN: Improved redness and swelling across the left sternoclavicular joint, no major tenderness    DATA:   Diagnostic tests reviewed for today's visit:    All labs and imaging results reviewed.      Lab Results   Component Value Date    WBC 3.6 06/04/2020    HGB 11.2 06/04/2020    HCT 34.8 06/04/2020    MCV 96.6 06/04/2020     06/04/2020     06/04/2020    K 4.0 06/04/2020    K 3.6 05/20/2020     06/04/2020    CO2 28 06/04/2020    BUN 9 06/04/2020    CREATININE 0.57 06/04/2020    CALCIUM 9.6 06/04/2020    ALKPHOS 50 05/15/2020    ALT 34 05/15/2020    AST 28 05/15/2020    BILITOT 0.4 05/15/2020    LABALBU 2.8 05/15/2020   LABS  No results for input(s): WBC, RBC, HGB, HCT, MCV, MCH, MCHC, RDW, PLT, MPV in the last 72 hours. No results for input(s): NA, K, CL, CO2, BUN, CREATININE, GLUCOSE, CALCIUM in the last 72 hours. No results for input(s): MG in the last 72 hours. Ir Picc Equal Or Greater Than 5 Years    Result Date: 5/27/2020  PERIPHERALLY INSERTED CENTRAL CATHETER (PICC) PLACEMENT WITH ULTRASOUND GUIDANCE CLINICAL HISTORY:  REQUIRES PROLONGED INTRAVENOUS ACCESS FOR TREATMENT OF CELLULITIS. After discussing the procedure and possible complications with the patient, informed consent was obtained. The patient was placed on the Special Procedures table. The left upper extremity was sterilely prepared using a maximal sterile barrier technique  which includes cap, mask, sterile gown, sterile gloves, sterile full-body drape, hand hygiene and 2% chlorhexidine for cutaneous antisepsis. A sterile ultrasound technique with sterile gel and sterile probe covers was also utilized. A pre-procedure time out was performed in order to assure the correct patient and procedure. Local anesthetic was administered. A peripheral vein was accessed with sonographic guidance. A sonographic spot image was obtained for documentation. A guidewire was advanced into the vein with fluoroscopic guidance and a sheath was placed over the guidewire. A 5-Arabic dual-lumen PICC was advanced through the sheath, up the arm and into the central vasculature. It was positioned appropriately. The sheath was removed. The catheter was shown to aspirate and infuse properly. The flange of the catheter was affixed to the arm using a PICC securement device. A spot image of the chest showed the tip of the PICC line to lie in the superior vena cava. The patient tolerated the procedure well and without complications. Number of films: 3 Fluoroscopy time: 6.1 seconds CONCLUSION: SUCCESSFUL LEFT PICC PLACEMENT WITHOUT IMMEDIATE COMPLICATIONS.               ASSESSMENT & PLAN  Active Hospital Problems    Diagnosis Date Noted    Therapeutic opioid-induced constipation (OIC) [K59.03, Margret Patch 05/23/2020    Streptococcal bacteremia [R78.81, B95.5]     Chest wall pain [R07.89] 05/20/2020    Neck pain on left side [M54.2] 05/20/2020    Septic arthritis of AC joint ([facility] [address] [M00.9] 05/20/2020    Thigh pain, musculoskeletal, left [M79.652] 05/20/2020    Neuropathic pain of thigh, left [M79.2] 05/20/2020    Laryngeal carcinoma ([facility] [address] [C32.9] 05/14/2020    Cellulitis [L03.90] 05/14/2020    Frequent falls [R29.6] 05/14/2020    Weakness [R53.1] 05/14/2020    Gastroesophageal reflux [K21.9] 05/14/2020        60years old male, history of laryngeal cancer status post cancer treatment including chemotherapy radiation and long-term trachea placement, status post tracheal infection, he removed trach, responded to antibiotic treatment after had developed left sternoclavicular joint osteomyelitis. Overall pain appears to be much better controlled    I have discussed to start slow weaning process anticipating discharge.     Overall looks comfortable and improving    RECOMMENDATION:  SEE ORDERS    I will keep him on the Duragesic patch 12 mcg every 72 hours    We will keep him on the oxycodone however I will be reducing the dose to 5-7.5 from the 10 mg, will leave it every 4 hours but slow weaning will start    Maintain on meloxicam 7.5 mg daily    I will be changing Tylenol to as needed at this time    Will increase Neurontin 300 mg 3 times daily    Continue topical analgesia    SIGNATURE: Michelle Minor MD PATIENT NAME:  Chelsey Hector   DATE: June 7, 2020 MRN: [id number]   TIME: 7:15 AM PAGER/CONTACT #: [phone]

## 2020-06-07 NOTE — PROGRESS NOTES
lesions. Neurologic:  Neurovascularly intact without any focal sensory/motor deficits. Psychiatric: Alert and oriented,    Capillary Refill: Brisk,< 3 seconds   Peripheral Pulses: +2 palpable, equal bilaterally       Labs:   No results for input(s): WBC, HGB, HCT, PLT in the last 72 hours. No results for input(s): NA, K, CL, CO2, BUN, CREATININE, CALCIUM, PHOS in the last 72 hours. Invalid input(s): MAGNES  No results for input(s): AST, ALT, BILIDIR, BILITOT, ALKPHOS in the last 72 hours. No results for input(s): INR in the last 72 hours. No results for input(s): Tinaa Jury in the last 72 hours.     Urinalysis:      Lab Results   Component Value Date    NITRU Negative 05/14/2020    WBCUA 0-2 05/14/2020    BACTERIA Negative 05/14/2020    RBCUA 0-2 05/14/2020    BLOODU Negative 05/14/2020    SPECGRAV 1.025 05/14/2020    GLUCOSEU Negative 05/14/2020       Radiology:  IR Picc Equal or Greater Than 5 Years   Final Result              Assessment/Plan:    Active Hospital Problems    Diagnosis Date Noted    Therapeutic opioid-induced constipation (OIC) [K59.03, T40.2X5A] 05/23/2020    Streptococcal bacteremia [R78.81, B95.5]     Chest wall pain [R07.89] 05/20/2020    Neck pain on left side [M54.2] 05/20/2020    Septic arthritis of AC joint ([facility] [address] [M00.9] 05/20/2020    Thigh pain, musculoskeletal, left [M79.652] 05/20/2020    Neuropathic pain of thigh, left [M79.2] 05/20/2020    Laryngeal carcinoma ([facility] [address] [C32.9] 05/14/2020    Cellulitis [L03.90] 05/14/2020    Frequent falls [R29.6] 05/14/2020    Weakness [R53.1] 05/14/2020    Gastroesophageal reflux [K21.9] 05/14/2020         DVT Prophylaxis: lovenox  Diet: Dietary Nutrition Supplements: Other Oral Supplement (see comment)  DIET GENERAL; Dysphagia Soft and Bite-Sized  Dietary Nutrition Supplements: Standard High Calorie Oral Supplement  Code Status: DNR-CC    PT/OT Eval Status: done    Dispo - Bacteremia, septic sternoclavicular joint arthritis- atbs per ID recommendations x2 more weeks     Pain due to above- per pain management  HTN- BP on lower side- decrease lasix, follow up clinically   History of laryngeal CA- post tracheostomy insertion-replacement of thrach per ENT done yesterday   Prolong hospital stay, weakness- rehab orders initiated  Constipation-resolved, continue with stool softeners    Hyponatremia on admission at [facility] MICHAEL HUIZAR- resolved, follow up with [facility] - [facility]    Medically stable for skilled admission at [facility]      Electronically signed by Prince Espinoza MD on 6/7/2020 at 9:58 AM

## 2020-06-08 LAB
ANION GAP SERPL CALCULATED.3IONS-SCNC: 8 MEQ/L (ref 9–15)
BASOPHILS ABSOLUTE: 0 K/UL (ref 0–0.2)
BASOPHILS RELATIVE PERCENT: 0.8 %
BUN BLDV-MCNC: 8 MG/DL (ref 8–23)
CALCIUM SERPL-MCNC: 9.9 MG/DL (ref 8.5–9.9)
CHLORIDE BLD-SCNC: 100 MEQ/L (ref 95–107)
CO2: 28 MEQ/L (ref 20–31)
CREAT SERPL-MCNC: 0.64 MG/DL (ref 0.7–1.2)
EOSINOPHILS ABSOLUTE: 0.4 K/UL (ref 0–0.7)
EOSINOPHILS RELATIVE PERCENT: 8.5 %
GFR AFRICAN AMERICAN: >60
GFR NON-AFRICAN AMERICAN: >60
GLUCOSE BLD-MCNC: 90 MG/DL (ref 70–99)
HCT VFR BLD CALC: 35 % (ref 42–52)
HEMOGLOBIN: 11.6 G/DL (ref 14–18)
LYMPHOCYTES ABSOLUTE: 1.3 K/UL (ref 1–4.8)
LYMPHOCYTES RELATIVE PERCENT: 25.1 %
MCH RBC QN AUTO: 31.1 PG (ref 27–31.3)
MCHC RBC AUTO-ENTMCNC: 33.1 % (ref 33–37)
MCV RBC AUTO: 94 FL (ref 80–100)
MONOCYTES ABSOLUTE: 0.7 K/UL (ref 0.2–0.8)
MONOCYTES RELATIVE PERCENT: 13.2 %
NEUTROPHILS ABSOLUTE: 2.7 K/UL (ref 1.4–6.5)
NEUTROPHILS RELATIVE PERCENT: 52.4 %
PDW BLD-RTO: 14.2 % (ref 11.5–14.5)
PLATELET # BLD: 354 K/UL (ref 130–400)
POTASSIUM SERPL-SCNC: 4.1 MEQ/L (ref 3.4–4.9)
RBC # BLD: 3.72 M/UL (ref 4.7–6.1)
SODIUM BLD-SCNC: 136 MEQ/L (ref 135–144)
WBC # BLD: 5.1 K/UL (ref 4.8–10.8)

## 2020-06-08 PROCEDURE — 2580000003 HC RX 258: Performed by: INTERNAL MEDICINE

## 2020-06-08 PROCEDURE — 80048 BASIC METABOLIC PNL TOTAL CA: CPT

## 2020-06-08 PROCEDURE — 6370000000 HC RX 637 (ALT 250 FOR IP): Performed by: ANESTHESIOLOGY

## 2020-06-08 PROCEDURE — 6360000002 HC RX W HCPCS: Performed by: INTERNAL MEDICINE

## 2020-06-08 PROCEDURE — 36592 COLLECT BLOOD FROM PICC: CPT

## 2020-06-08 PROCEDURE — 6370000000 HC RX 637 (ALT 250 FOR IP): Performed by: INTERNAL MEDICINE

## 2020-06-08 PROCEDURE — 85025 COMPLETE CBC W/AUTO DIFF WBC: CPT

## 2020-06-08 PROCEDURE — 97110 THERAPEUTIC EXERCISES: CPT

## 2020-06-08 PROCEDURE — 97116 GAIT TRAINING THERAPY: CPT

## 2020-06-08 PROCEDURE — 1200000002 HC SEMI PRIVATE SWING BED

## 2020-06-08 RX ORDER — OXYCODONE HYDROCHLORIDE AND ACETAMINOPHEN 5; 325 MG/1; MG/1
2 TABLET ORAL EVERY 4 HOURS PRN
Status: ON HOLD | COMMUNITY
End: 2020-06-14 | Stop reason: HOSPADM

## 2020-06-08 RX ADMIN — OXYCODONE HYDROCHLORIDE 7.5 MG: 5 TABLET ORAL at 11:40

## 2020-06-08 RX ADMIN — PIPERACILLIN AND TAZOBACTAM 3.38 G: 3; .375 INJECTION, POWDER, LYOPHILIZED, FOR SOLUTION INTRAVENOUS at 05:10

## 2020-06-08 RX ADMIN — OXYCODONE HYDROCHLORIDE 7.5 MG: 5 TABLET ORAL at 16:00

## 2020-06-08 RX ADMIN — ENOXAPARIN SODIUM 40 MG: 40 INJECTION SUBCUTANEOUS at 09:14

## 2020-06-08 RX ADMIN — GABAPENTIN 300 MG: 300 CAPSULE ORAL at 19:54

## 2020-06-08 RX ADMIN — MELOXICAM 7.5 MG: 7.5 TABLET ORAL at 09:15

## 2020-06-08 RX ADMIN — OXYCODONE HYDROCHLORIDE 7.5 MG: 5 TABLET ORAL at 07:27

## 2020-06-08 RX ADMIN — OXYCODONE HYDROCHLORIDE 7.5 MG: 5 TABLET ORAL at 03:02

## 2020-06-08 RX ADMIN — Medication 10 ML: at 09:14

## 2020-06-08 RX ADMIN — PIPERACILLIN AND TAZOBACTAM 3.38 G: 3; .375 INJECTION, POWDER, LYOPHILIZED, FOR SOLUTION INTRAVENOUS at 16:00

## 2020-06-08 RX ADMIN — FUROSEMIDE 10 MG: 20 TABLET ORAL at 09:16

## 2020-06-08 RX ADMIN — FAMOTIDINE 20 MG: 20 TABLET ORAL at 09:15

## 2020-06-08 RX ADMIN — PIPERACILLIN AND TAZOBACTAM 3.38 G: 3; .375 INJECTION, POWDER, LYOPHILIZED, FOR SOLUTION INTRAVENOUS at 22:16

## 2020-06-08 RX ADMIN — Medication 10 ML: at 19:53

## 2020-06-08 RX ADMIN — GABAPENTIN 300 MG: 300 CAPSULE ORAL at 09:15

## 2020-06-08 RX ADMIN — FAMOTIDINE 20 MG: 20 TABLET ORAL at 19:54

## 2020-06-08 RX ADMIN — GABAPENTIN 300 MG: 300 CAPSULE ORAL at 16:00

## 2020-06-08 RX ADMIN — LEVOTHYROXINE SODIUM 75 MCG: 75 TABLET ORAL at 05:10

## 2020-06-08 RX ADMIN — FINASTERIDE 5 MG: 5 TABLET, FILM COATED ORAL at 09:15

## 2020-06-08 ASSESSMENT — PAIN DESCRIPTION - LOCATION
LOCATION: THROAT

## 2020-06-08 ASSESSMENT — PAIN SCALES - GENERAL
PAINLEVEL_OUTOF10: 7
PAINLEVEL_OUTOF10: 8
PAINLEVEL_OUTOF10: 7
PAINLEVEL_OUTOF10: 0
PAINLEVEL_OUTOF10: 7

## 2020-06-08 ASSESSMENT — PAIN DESCRIPTION - DESCRIPTORS
DESCRIPTORS: ACHING;SORE
DESCRIPTORS: ACHING
DESCRIPTORS: ACHING;SORE

## 2020-06-08 ASSESSMENT — PAIN DESCRIPTION - PROGRESSION
CLINICAL_PROGRESSION: NOT CHANGED
CLINICAL_PROGRESSION: GRADUALLY WORSENING
CLINICAL_PROGRESSION: NOT CHANGED
CLINICAL_PROGRESSION: GRADUALLY WORSENING

## 2020-06-08 ASSESSMENT — PAIN DESCRIPTION - PAIN TYPE: TYPE: ACUTE PAIN

## 2020-06-08 ASSESSMENT — PAIN DESCRIPTION - ORIENTATION
ORIENTATION: LEFT

## 2020-06-08 ASSESSMENT — PAIN - FUNCTIONAL ASSESSMENT: PAIN_FUNCTIONAL_ASSESSMENT: PREVENTS OR INTERFERES SOME ACTIVE ACTIVITIES AND ADLS

## 2020-06-08 ASSESSMENT — PAIN DESCRIPTION - ONSET: ONSET: ON-GOING

## 2020-06-08 ASSESSMENT — PAIN DESCRIPTION - FREQUENCY: FREQUENCY: CONTINUOUS

## 2020-06-08 NOTE — PROGRESS NOTES
Occupational Therapy  Facility/Department: [facility] MED SURG UNIT  Daily Treatment Note  NAME: Cher Resendiz  : 1960  MRN: [id number]    Date of Service: 2020    Discharge Recommendations:  Home with Home health OT, Home with assist PRN       Assessment   Performance deficits / Impairments: Decreased functional mobility ; Decreased ADL status; Decreased ROM; Decreased strength;Decreased endurance;Decreased sensation;Decreased balance  Assessment: Pt spv sit <-> stands, SBA fxl mob using RW. Pt maintaining with OT, seen cont'd for maintaining strength and ADL status- here for IV needs. Prognosis: Good  REQUIRES OT FOLLOW UP: Yes         Patient Diagnosis(es): There were no encounter diagnoses. has a past medical history of Hepatitis B, Hepatitis C, Hypothyroid, and Other specified disorders of brain. has no past surgical history on file.     Restrictions  Restrictions/Precautions  Restrictions/Precautions: Fall Risk  Required Braces or Orthoses?: No  Subjective   General  Chart Reviewed: Yes  Patient assessed for rehabilitation services?: Yes  Family / Caregiver Present: No  Referring Practitioner: Conchita Dhillon MD  Subjective  Subjective: Pt agreeable to OT  General Comment  Comments: demos good activity toll and increase core stability   Pain Assessment  Pain Assessment: 0-10  Pain Level: 7  Vital Signs  Patient Currently in Pain: Yes      Objective             Functional Mobility  Functional - Mobility Device: Rolling Walker  Activity: To/From therapy gym  Assist Level: Stand by assistance     Transfers  Sit to stand: Supervision  Stand to sit: Supervision                  Exercises  Shoulder Flexion: 1x15 reps BUE for press-ups, forward rows, and backward rows  Horizontal ABduction: 1x15 reps BUE  Horizontal ADduction: 1x15 reps BUE  Elbow Flexion: 1x15 reps BUE  Elbow Extension: 1x15 reps BUE for chest presses  Wrist Flexion: 1x15 reps BUE  Wrist Extension: 1x15 reps BUE  Other: to increase strength/end for ADL. (rest break between each set )                    Plan   Plan  Times per week: 3-6 xs/week  Times per day: Daily  Specific instructions for Next Treatment: full ADL  Current Treatment Recommendations: Strengthening, ROM, Balance Training, Functional Mobility Training, Endurance Training, Pain Management, Safety Education & Training, Patient/Caregiver Education & Training, Equipment Evaluation, Education, & procurement, Positioning, Home Management Training, Self-Care / ADL  Plan Comment: Pt. requires skilled OT intervention services needed to increase balance, strength, and endurance needed to increase safety and independence with ADLs, transfers, and functional mobility. Goals  Short term goals  Time Frame for Short term goals: 10 days to 3 weeks or length of IV  Short term goal 1: same as LTG  Short term goal 2: same as LTG  Short term goal 3: same as LTG  Short term goal 4: same as LTG  Short term goal 5: same as LTG  Long term goals  Time Frame for Long term goals : 10 days to 3 weeks or length of IV  Long term goal 1: Pt. will complete UB/LB bathing at mod I level. Long term goal 2: Pt. will complete UB/LB dressing at mod I level. Long term goal 3: Pt. will complete functional transfers at mod I level. Long term goal 4: Pt. will demonstrate good dynamic standing balance during ADL completion with mod I.  Long term goal 5: Pt. will complete BUE strengthening/ROM exercises needed for transfers and ADLs.   Patient Goals   Patient goals : to get better       Therapy Time   Individual Concurrent Group Co-treatment   Time In  12:30         Time Out  1:00         Minutes  [id number] [place], [place]

## 2020-06-08 NOTE — PROGRESS NOTES
Physical Therapy  Facility/Department: [facility] MED SURG UNIT  Daily Treatment Note  NAME: Katja Davis  : 1960  MRN: [id number]    Date of Service: 2020    Discharge Recommendations:  Home with assist PRN, Home with Home health PT        Assessment   Body structures, Functions, Activity limitations: Decreased endurance;Decreased functional mobility ; Decreased strength; Increased pain  Assessment: Pt. able to complete stairs with B HR and CGA/SBA non reciprical.  Pt. also able to ambulate 200' but shakey short standing RB   Pt. defers standing therex due to L knee pain and fatigue. Treatment Diagnosis: weakness with decreased fucn tinoal mobiltiy independence due to cellulits chest wall  Prognosis: Good  REQUIRES PT FOLLOW UP: Yes  Activity Tolerance  Activity Tolerance: Patient Tolerated treatment well     Patient Diagnosis(es): There were no encounter diagnoses. has a past medical history of Hepatitis B, Hepatitis C, Hypothyroid, and Other specified disorders of brain. has no past surgical history on file. Restrictions  Restrictions/Precautions  Restrictions/Precautions: Fall Risk  Required Braces or Orthoses?: No  Subjective   General  Chart Reviewed: Yes  Additional Pertinent Hx: Trach for at least 5 years, now iwth cellulitis of chest wall/pain and  need for antibiotics for 6 weeks  Family / Caregiver Present: No  Referring Practitioner: Dr Terese Anderson  Subjective  Subjective: Pt. hand signals pain 6/10 in L knee and trach. Pt.  mouths the [employer] is fast for him and prefers [employer].       Pre Treatment Pain Screening  Pain at present: 6  Scale Used: Numeric Score  Intervention List: Patient able to continue with treatment  Comments / Details: trach and knee     Orientation     Cognition      Objective      Transfers  Sit to Stand: Stand by assistance;Supervision  Stand to sit: Stand by assistance;Supervision  Ambulation  Ambulation?: Yes  Ambulation 1  Surface: level tile  Device: Rolling

## 2020-06-08 NOTE — PROGRESS NOTES
Following up on the patient for the first time. Pain at the left sternoclavicular joint. No chest pain or palpitation. No abdominal pain, nausea, vomiting, diarrhea, dysuria or hematuria. ROS: 12 system review otherwise is negative for acute signs or symptoms over the last 24 hrs. Exam: Awake, alert oriented, in no apparent distress  HEENT: Pale conjunctiva and buccal mucosa. Normal and throat and nose  Neck: Supple, no nuchal rigidity. Tracheostomy. Swelling and tenderness of the left sternoclavicular joint. Endo: No thyromegaly. Vascular: No JVD or carotid bruit. Chest: Clear to auscultation, no dullness to percussion. Heart: Regular rate and rhythm, no extra sounds. No murmur, no rub. Abdomen: Soft, no tenderness, no rebound, no rigidity. Clinically I could not exclude the possibility of intra-abdominal mass or organomegaly. LE: No cyanosis or clubbing, no varices or edema. Neuro: Awake, alert, oriented, normal speech, normal comprehension, normal extension, normal cranial nerves, normal and symmetrical motor and tone examination throughout. MS: No joint effusion or tenderness. Skin: No skin rash, itching, bruising or significant findings. Assessment and plan: This documentation may or may not be complete    *Septic joint. Antibiotic for 2 more weeks as per ID recommendation. *Pain, pain management is handled by Dr. Helene Portillo. *Hypertension, stable, DC Lasix. *Laryngeal cancer status post tracheostomy. Follow-up with ENT. *Anemia, stable, no evidence of acute blood loss. Multiple complex medical issues as listed above and others that are not listed all to be addressed in the outpatient setting. I may or may not have addressed all of this pt symptoms, medical issues, abnormal labs and findings.  Pt will need additional work up, investigation, testing, surveillance, and treatment to be done at a later time and date during this hospitalization or post discharge by PCP

## 2020-06-09 PROCEDURE — 97116 GAIT TRAINING THERAPY: CPT

## 2020-06-09 PROCEDURE — 6370000000 HC RX 637 (ALT 250 FOR IP): Performed by: INTERNAL MEDICINE

## 2020-06-09 PROCEDURE — 97535 SELF CARE MNGMENT TRAINING: CPT

## 2020-06-09 PROCEDURE — 97530 THERAPEUTIC ACTIVITIES: CPT

## 2020-06-09 PROCEDURE — 2580000003 HC RX 258: Performed by: INTERNAL MEDICINE

## 2020-06-09 PROCEDURE — 97110 THERAPEUTIC EXERCISES: CPT

## 2020-06-09 PROCEDURE — 6360000002 HC RX W HCPCS: Performed by: INTERNAL MEDICINE

## 2020-06-09 PROCEDURE — 6370000000 HC RX 637 (ALT 250 FOR IP): Performed by: ANESTHESIOLOGY

## 2020-06-09 PROCEDURE — 1200000002 HC SEMI PRIVATE SWING BED

## 2020-06-09 RX ADMIN — PIPERACILLIN AND TAZOBACTAM 3.38 G: 3; .375 INJECTION, POWDER, LYOPHILIZED, FOR SOLUTION INTRAVENOUS at 15:20

## 2020-06-09 RX ADMIN — GABAPENTIN 300 MG: 300 CAPSULE ORAL at 15:20

## 2020-06-09 RX ADMIN — OXYCODONE HYDROCHLORIDE 7.5 MG: 5 TABLET ORAL at 03:22

## 2020-06-09 RX ADMIN — Medication 10 ML: at 20:45

## 2020-06-09 RX ADMIN — MELOXICAM 7.5 MG: 7.5 TABLET ORAL at 07:44

## 2020-06-09 RX ADMIN — GABAPENTIN 300 MG: 300 CAPSULE ORAL at 20:42

## 2020-06-09 RX ADMIN — OXYCODONE HYDROCHLORIDE 7.5 MG: 5 TABLET ORAL at 15:39

## 2020-06-09 RX ADMIN — OXYCODONE HYDROCHLORIDE 7.5 MG: 5 TABLET ORAL at 11:42

## 2020-06-09 RX ADMIN — PIPERACILLIN AND TAZOBACTAM 3.38 G: 3; .375 INJECTION, POWDER, LYOPHILIZED, FOR SOLUTION INTRAVENOUS at 22:09

## 2020-06-09 RX ADMIN — Medication 10 ML: at 07:44

## 2020-06-09 RX ADMIN — LEVOTHYROXINE SODIUM 75 MCG: 75 TABLET ORAL at 05:52

## 2020-06-09 RX ADMIN — OXYCODONE HYDROCHLORIDE 7.5 MG: 5 TABLET ORAL at 19:41

## 2020-06-09 RX ADMIN — FAMOTIDINE 20 MG: 20 TABLET ORAL at 07:44

## 2020-06-09 RX ADMIN — FAMOTIDINE 20 MG: 20 TABLET ORAL at 20:42

## 2020-06-09 RX ADMIN — GABAPENTIN 300 MG: 300 CAPSULE ORAL at 07:44

## 2020-06-09 RX ADMIN — PIPERACILLIN AND TAZOBACTAM 3.38 G: 3; .375 INJECTION, POWDER, LYOPHILIZED, FOR SOLUTION INTRAVENOUS at 05:52

## 2020-06-09 RX ADMIN — OXYCODONE HYDROCHLORIDE 7.5 MG: 5 TABLET ORAL at 07:39

## 2020-06-09 RX ADMIN — FINASTERIDE 5 MG: 5 TABLET, FILM COATED ORAL at 07:44

## 2020-06-09 RX ADMIN — ENOXAPARIN SODIUM 40 MG: 40 INJECTION SUBCUTANEOUS at 07:45

## 2020-06-09 ASSESSMENT — PAIN DESCRIPTION - PAIN TYPE
TYPE: CHRONIC PAIN
TYPE_2: CHRONIC PAIN

## 2020-06-09 ASSESSMENT — PAIN SCALES - GENERAL
PAINLEVEL_OUTOF10: 6
PAINLEVEL_OUTOF10: 7
PAINLEVEL_OUTOF10: 6
PAINLEVEL_OUTOF10: 6
PAINLEVEL_OUTOF10: 7

## 2020-06-09 ASSESSMENT — ENCOUNTER SYMPTOMS
SINUS PAIN: 0
EYES NEGATIVE: 1
SORE THROAT: 1
VOICE CHANGE: 0
RESPIRATORY NEGATIVE: 1
FACIAL SWELLING: 1
RHINORRHEA: 0
BACK PAIN: 0
GASTROINTESTINAL NEGATIVE: 1
SINUS PRESSURE: 0
TROUBLE SWALLOWING: 1
COLOR CHANGE: 1

## 2020-06-09 ASSESSMENT — PAIN DESCRIPTION - LOCATION
LOCATION_2: KNEE
LOCATION: THROAT

## 2020-06-09 ASSESSMENT — PAIN DESCRIPTION - ONSET: ONSET: ON-GOING

## 2020-06-09 ASSESSMENT — PAIN DESCRIPTION - FREQUENCY: FREQUENCY: CONTINUOUS

## 2020-06-09 ASSESSMENT — PAIN DESCRIPTION - PROGRESSION
CLINICAL_PROGRESSION: NOT CHANGED

## 2020-06-09 ASSESSMENT — PAIN DESCRIPTION - DESCRIPTORS: DESCRIPTORS: ACHING

## 2020-06-09 ASSESSMENT — PAIN DESCRIPTION - INTENSITY: RATING_2: 7

## 2020-06-09 ASSESSMENT — PAIN DESCRIPTION - ORIENTATION: ORIENTATION_2: LEFT

## 2020-06-09 NOTE — PLAN OF CARE
Problem: Falls - Risk of:  Goal: Will remain free from falls  Description: Will remain free from falls  Outcome: Met This Shift  Goal: Absence of physical injury  Description: Absence of physical injury  Outcome: Met This Shift     Problem: DAILY CARE  Goal: Daily care needs are met  Outcome: Met This Shift     Problem: Pain:  Goal: Pain level will decrease  Description: Pain level will decrease  Outcome: Ongoing

## 2020-06-09 NOTE — PROGRESS NOTES
Therapy Time   Individual Concurrent Group Co-treatment   Time In  10:30         Time Out  11:20         Minutes  [address] [facility], [facility] [address],[facility] Floor Number: [id number]

## 2020-06-09 NOTE — PROGRESS NOTES
PROGRESS NOTE -PAIN MANAGEMENT     SERVICE DATE:  6/9/2020   SERVICE TIME:  12:48 PM    CHIEFCOMPLAINT: Neck pain, left-sided upper chest wall pain, sternoclavicular joint osteomyelitis    SUBJECTIVE:  Mr. Gama Garibay is a 60 y.o. male who presentedfor antibiotic treatment for left sternoclavicular osteomyelitis and infection related to the trach in his left side of the neck. He has history of laryngeal cancer, status post radiation therapy in the past.  He is trached, is nonverbal as well. Patient states  pain is well controlled on the current treatment, patient is perspiring well and required therapy, providing appropriate pain relief with a combination therapy provided for his pain was Duragesic patch and oxycodone.     PAIN  ASSESSMENT:    constant    aching and throbbing    pain is perceived as moderate (4-6 pain scale)      MEDICATIONS:    Current Facility-Administered Medications   Medication Dose Route Frequency Provider Last Rate Last Dose    oxyCODONE (ROXICODONE) immediate release tablet 7.5 mg  7.5 mg Oral Q4H PRN Shayna Prado MD   7.5 mg at 06/09/20 1142    Or    oxyCODONE (ROXICODONE) immediate release tablet 5 mg  5 mg Oral Q4H PRN Shayna Prado MD        acetaminophen (TYLENOL) tablet 650 mg  650 mg Oral Q4H PRN Shayna Prado MD        gabapentin (NEURONTIN) capsule 300 mg  300 mg Oral TID Shayna Prado MD   300 mg at 06/09/20 0744    finasteride (PROSCAR) tablet 5 mg  5 mg Oral Daily Shelton Ward MD   5 mg at 06/09/20 0744    sennosides-docusate sodium (SENOKOT-S) 8.6-50 MG tablet 2 tablet  2 tablet Oral BID Shelton Ward MD   2 tablet at 06/07/20 2025    piperacillin-tazobactam (ZOSYN) 3.375 g in dextrose 5 % 50 mL IVPB extended infusion (mini-bag)  3.375 g Intravenous 3 times per day Husam Serna MD   Stopped at 06/09/20 0952    methylnaltrexone (RELISTOR) injection 12 mg  12 mg Subcutaneous Q48H PRN Shayna Prado MD   12 mg at 05/24/20 0821    sodium chloride flush 0.9 % injection stiffness. Negative for back pain and gait problem. Skin: Positive for color change, pallor and wound. Negative for rash. Allergic/Immunologic: Positive for immunocompromised state. Negative for environmental allergies and food allergies. Neurological: Positive for weakness. Negative for dizziness, tremors, seizures, syncope, facial asymmetry, speech difficulty, light-headedness, numbness and headaches. Hematological: Negative. Psychiatric/Behavioral: Positive for dysphoric mood and sleep disturbance. Negative for agitation, behavioral problems, confusion, decreased concentration, hallucinations, self-injury and suicidal ideas. The patient is not nervous/anxious and is not hyperactive. OBJECTIVE  PHYSICAL EXAM:  BP (!) 104/58   Pulse 67   Temp 98.2 °F (36.8 °C) (Oral)   Resp 20   Ht 5' 3\" (1.6 m)   Wt 262 lb (118.8 kg)   SpO2 97%   BMI 46.41 kg/m²   Body mass index is 46.41 kg/m². CONSTITUTIONAL:  awake, alert, cooperative, no apparent distress, and appears stated age  ENT: Unchanged deformities left side of the neck, improved swelling on the left side of the neck  NECK: Slightly deformed skin on the left side of the neck, improved swelling and pain, and supple, symmetrical, trachea midline  LUNGS: No wheezes no rhonchi  CARDIOVASCULAR:  regular rate and rhythm  MUSCULOSKELETAL: Normal muscle tone normal joint range of motion  NEUROLOGIC: Neurologically stable, no neurological deficits  SKIN: Improved redness and swelling across the left sternoclavicular joint, no major tenderness    DATA:   Diagnostic tests reviewed for today's visit:    All labs and imaging results reviewed.      Lab Results   Component Value Date    WBC 5.1 06/08/2020    HGB 11.6 06/08/2020    HCT 35.0 06/08/2020    MCV 94.0 06/08/2020     06/08/2020     06/08/2020    K 4.1 06/08/2020    K 3.6 05/20/2020     06/08/2020    CO2 28 06/08/2020    BUN 8 06/08/2020    CREATININE 0.64 06/08/2020 CALCIUM 9.9 06/08/2020    ALKPHOS 50 05/15/2020    ALT 34 05/15/2020    AST 28 05/15/2020    BILITOT 0.4 05/15/2020    LABALBU 2.8 05/15/2020   LABS  Recent Labs     06/08/20  0559   WBC 5.1   RBC 3.72*   HGB 11.6*   HCT 35.0*   MCV 94.0   MCH 31.1   MCHC 33.1   RDW 14.2          Recent Labs     06/08/20  0559      K 4.1      CO2 28   BUN 8   CREATININE 0.64*   GLUCOSE 90   CALCIUM 9.9       No results for input(s): MG in the last 72 hours. Ir Picc Equal Or Greater Than 5 Years    Result Date: 5/27/2020  PERIPHERALLY INSERTED CENTRAL CATHETER (PICC) PLACEMENT WITH ULTRASOUND GUIDANCE CLINICAL HISTORY:  REQUIRES PROLONGED INTRAVENOUS ACCESS FOR TREATMENT OF CELLULITIS. After discussing the procedure and possible complications with the patient, informed consent was obtained. The patient was placed on the Special Procedures table. The left upper extremity was sterilely prepared using a maximal sterile barrier technique  which includes cap, mask, sterile gown, sterile gloves, sterile full-body drape, hand hygiene and 2% chlorhexidine for cutaneous antisepsis. A sterile ultrasound technique with sterile gel and sterile probe covers was also utilized. A pre-procedure time out was performed in order to assure the correct patient and procedure. Local anesthetic was administered. A peripheral vein was accessed with sonographic guidance. A sonographic spot image was obtained for documentation. A guidewire was advanced into the vein with fluoroscopic guidance and a sheath was placed over the guidewire. A 5-Albanian dual-lumen PICC was advanced through the sheath, up the arm and into the central vasculature. It was positioned appropriately. The sheath was removed. The catheter was shown to aspirate and infuse properly. The flange of the catheter was affixed to the arm using a PICC securement device. A spot image of the chest showed the tip of the PICC line to lie in the superior vena cava.

## 2020-06-09 NOTE — PROGRESS NOTES
Physical Therapy  Facility/Department: [facility] MED SURG UNIT  Daily Treatment Note  NAME: John Granados  : 1960  MRN: [id number]    Date of Service: 2020    Discharge Recommendations:  Home with assist PRN, Home with Home health PT        Assessment   Body structures, Functions, Activity limitations: Decreased endurance;Decreased functional mobility ; Decreased strength; Increased pain  Assessment: Pt. able to progress with standing therex this date to improve strength and endurance seated RB after ea. but good motivation. Also able to complete gait and stairs this date with no inc in pain. Treatment Diagnosis: weakness with decreased fucn tinoal mobiltiy independence due to cellulits chest wall  Prognosis: Good  REQUIRES PT FOLLOW UP: Yes  Activity Tolerance  Activity Tolerance: Patient Tolerated treatment well     Patient Diagnosis(es): There were no encounter diagnoses. has a past medical history of Hepatitis B, Hepatitis C, Hypothyroid, and Other specified disorders of brain. has no past surgical history on file. Restrictions  Restrictions/Precautions  Restrictions/Precautions: Fall Risk  Required Braces or Orthoses?: No  Subjective   General  Chart Reviewed: Yes  Additional Pertinent Hx: Trach for at least 5 years, now iwth cellulitis of chest wall/pain and  need for antibiotics for 6 weeks  Family / Caregiver Present: No  Referring Practitioner: Dr Murphy Allen  Subjective: Pt. hand gestures and mouths he is doing ok.        Pre Treatment Pain Screening  Pain at present: 6  Scale Used: Numeric Score  Intervention List: Patient able to continue with treatment  Comments / Details: (L knee )    Orientation     Cognition      Objective      Transfers  Sit to Stand: Stand by assistance;Supervision  Stand to sit: Stand by assistance;Supervision  Ambulation  Ambulation?: Yes  Ambulation 1  Surface: level tile  Device: Rolling Walker  Other Apparatus: (IV pole )  Assistance: Stand by Plan  Times per week: 5-7x week  Times per day: (QD to BiD)  Plan weeks: 6 weeks or legnth of IVs   Current Treatment Recommendations: Strengthening, Functional Mobility Training, Transfer Training, Balance Training, Endurance Training, Stair training, Gait Training, Pain Management, Patient/Caregiver Education & Training, Safety Education & Training, Manual Therapy - Joint Manipulation, Home Exercise Program, Equipment Evaluation, Education, & procurement, Positioning  Plan Comment: Cont. POC  Safety Devices  Type of devices:  All fall risk precautions in place, Call light within reach, Gait belt, Left in chair     Therapy Time   Individual Concurrent Group Co-treatment   Time In  215         Time Out  Yeimy 1923, PTA  License and [address] Number: [id number]

## 2020-06-10 PROCEDURE — 97110 THERAPEUTIC EXERCISES: CPT

## 2020-06-10 PROCEDURE — 2580000003 HC RX 258: Performed by: INTERNAL MEDICINE

## 2020-06-10 PROCEDURE — 97112 NEUROMUSCULAR REEDUCATION: CPT

## 2020-06-10 PROCEDURE — 1200000002 HC SEMI PRIVATE SWING BED

## 2020-06-10 PROCEDURE — 97530 THERAPEUTIC ACTIVITIES: CPT

## 2020-06-10 PROCEDURE — 6370000000 HC RX 637 (ALT 250 FOR IP): Performed by: INTERNAL MEDICINE

## 2020-06-10 PROCEDURE — 97116 GAIT TRAINING THERAPY: CPT

## 2020-06-10 PROCEDURE — 6360000002 HC RX W HCPCS: Performed by: INTERNAL MEDICINE

## 2020-06-10 PROCEDURE — 6370000000 HC RX 637 (ALT 250 FOR IP): Performed by: ANESTHESIOLOGY

## 2020-06-10 RX ADMIN — FINASTERIDE 5 MG: 5 TABLET, FILM COATED ORAL at 08:45

## 2020-06-10 RX ADMIN — OXYCODONE HYDROCHLORIDE 7.5 MG: 5 TABLET ORAL at 01:57

## 2020-06-10 RX ADMIN — OXYCODONE HYDROCHLORIDE 7.5 MG: 5 TABLET ORAL at 14:13

## 2020-06-10 RX ADMIN — LEVOTHYROXINE SODIUM 75 MCG: 75 TABLET ORAL at 05:59

## 2020-06-10 RX ADMIN — GABAPENTIN 300 MG: 300 CAPSULE ORAL at 14:13

## 2020-06-10 RX ADMIN — OXYCODONE HYDROCHLORIDE 7.5 MG: 5 TABLET ORAL at 10:14

## 2020-06-10 RX ADMIN — OXYCODONE HYDROCHLORIDE 7.5 MG: 5 TABLET ORAL at 05:59

## 2020-06-10 RX ADMIN — PIPERACILLIN AND TAZOBACTAM 3.38 G: 3; .375 INJECTION, POWDER, LYOPHILIZED, FOR SOLUTION INTRAVENOUS at 14:13

## 2020-06-10 RX ADMIN — PIPERACILLIN AND TAZOBACTAM 3.38 G: 3; .375 INJECTION, POWDER, LYOPHILIZED, FOR SOLUTION INTRAVENOUS at 05:33

## 2020-06-10 RX ADMIN — PIPERACILLIN AND TAZOBACTAM 3.38 G: 3; .375 INJECTION, POWDER, LYOPHILIZED, FOR SOLUTION INTRAVENOUS at 22:13

## 2020-06-10 RX ADMIN — FAMOTIDINE 20 MG: 20 TABLET ORAL at 08:45

## 2020-06-10 RX ADMIN — FAMOTIDINE 20 MG: 20 TABLET ORAL at 20:43

## 2020-06-10 RX ADMIN — MELOXICAM 7.5 MG: 7.5 TABLET ORAL at 08:45

## 2020-06-10 RX ADMIN — OXYCODONE HYDROCHLORIDE 7.5 MG: 5 TABLET ORAL at 22:13

## 2020-06-10 RX ADMIN — GABAPENTIN 300 MG: 300 CAPSULE ORAL at 20:42

## 2020-06-10 RX ADMIN — GABAPENTIN 300 MG: 300 CAPSULE ORAL at 08:45

## 2020-06-10 RX ADMIN — OXYCODONE HYDROCHLORIDE 7.5 MG: 5 TABLET ORAL at 18:09

## 2020-06-10 RX ADMIN — ENOXAPARIN SODIUM 40 MG: 40 INJECTION SUBCUTANEOUS at 08:44

## 2020-06-10 RX ADMIN — Medication 10 ML: at 08:45

## 2020-06-10 ASSESSMENT — PAIN DESCRIPTION - ONSET
ONSET: ON-GOING

## 2020-06-10 ASSESSMENT — PAIN SCALES - GENERAL
PAINLEVEL_OUTOF10: 6
PAINLEVEL_OUTOF10: 5
PAINLEVEL_OUTOF10: 5
PAINLEVEL_OUTOF10: 6
PAINLEVEL_OUTOF10: 7
PAINLEVEL_OUTOF10: 2
PAINLEVEL_OUTOF10: 5
PAINLEVEL_OUTOF10: 5
PAINLEVEL_OUTOF10: 7

## 2020-06-10 ASSESSMENT — PAIN DESCRIPTION - LOCATION
LOCATION: THROAT

## 2020-06-10 ASSESSMENT — PAIN DESCRIPTION - PAIN TYPE
TYPE: ACUTE PAIN

## 2020-06-10 ASSESSMENT — PAIN DESCRIPTION - PROGRESSION
CLINICAL_PROGRESSION: GRADUALLY IMPROVING
CLINICAL_PROGRESSION: NOT CHANGED
CLINICAL_PROGRESSION: GRADUALLY WORSENING
CLINICAL_PROGRESSION: NOT CHANGED
CLINICAL_PROGRESSION: GRADUALLY WORSENING

## 2020-06-10 ASSESSMENT — PAIN DESCRIPTION - DESCRIPTORS
DESCRIPTORS: ACHING;SORE
DESCRIPTORS: SORE
DESCRIPTORS: ACHING;SORE

## 2020-06-10 ASSESSMENT — PAIN DESCRIPTION - FREQUENCY
FREQUENCY: CONTINUOUS

## 2020-06-10 ASSESSMENT — PAIN DESCRIPTION - ORIENTATION
ORIENTATION: LEFT

## 2020-06-10 ASSESSMENT — PAIN - FUNCTIONAL ASSESSMENT
PAIN_FUNCTIONAL_ASSESSMENT: PREVENTS OR INTERFERES SOME ACTIVE ACTIVITIES AND ADLS
PAIN_FUNCTIONAL_ASSESSMENT: PREVENTS OR INTERFERES SOME ACTIVE ACTIVITIES AND ADLS
PAIN_FUNCTIONAL_ASSESSMENT: ACTIVITIES ARE NOT PREVENTED

## 2020-06-10 NOTE — PROGRESS NOTES
Nutrition Assessment    Type and Reason for Visit: Reassess    Nutrition Recommendations: Continue current diet, Continue current ONS    Nutrition Assessment: Moderately malnourished pt, shows weight loss since admit, inspite of improved intake & ONS use. Will monitor weight trends, if continues to lose weight will discuss ways he can add calories to his meals both here & at home. POC is to d/c home with Hospice services. Will continue to follow for LOS. Malnutrition Assessment:  · Malnutrition Status: Meets the criteria for moderate malnutrition  · Context: Chronic illness  · Findings of the 6 clinical characteristics of malnutrition (Minimum of 2 out of 6 clinical characteristics is required to make the diagnosis of moderate or severe Protein Calorie Malnutrition based on AND/ASPEN Guidelines):  1. Energy Intake-Less than or equal to 75% of estimated energy requirement, Greater than or equal to 7 days    2. Weight Loss-2% loss or greater, (3 weeks)  3. Fat Loss-Moderate subcutaneous fat loss(visual exam), Orbital  4. Muscle Loss-Mild muscle mass loss(visual exam), Temples (temporalis muscle)  5. Fluid Accumulation-No significant fluid accumulation,      Nutrition Risk Level: Moderate    Nutrient Needs:  · Estimated Daily Total Kcal: 8475-6752 @ 32-35 kcal/kg  · Estimated Daily Protein (g): 74-87 @ 1.2-1.4 gr/kg  · Estimated Daily Total Fluid (ml/day): 1860 ml/d    Nutrition Diagnosis:   · Problem: Moderate malnutrition, In context of chronic illness  · Etiology: related to Difficulty swallowing, Insufficient energy/nutrient consumption     Signs and symptoms:  as evidenced by Diet history of poor intake, Mild muscle loss, Mild loss of subcutaneous fat    Objective Information:  · Nutrition-Focused Physical Findings: No edema. Last BM 6/8.   · Wound Type: None  · Current Nutrition Therapies:  · Oral Diet Orders: General, Dysphagia  Soft and Bite-Sized (Dysphagia 3)   · Oral Diet intake: 51-75%,

## 2020-06-10 NOTE — PROGRESS NOTES
Physical Therapy  Facility/Department: [facility] MED SURG UNIT  Daily Treatment Note  NAME: John Granados  : 1960  MRN: [id number]    Date of Service: 6/10/2020    Discharge Recommendations:  Home with assist PRN, Home with Home health PT        Assessment   Body structures, Functions, Activity limitations: Decreased endurance;Decreased functional mobility ; Decreased strength; Increased pain  Assessment: Initaitated dynamic gait activities at tai rail able to perofmr with CGSA x 1 and one to two UE support no LOB noted. Mild unsteadiness with inc distance of gait pt. reports has neuropathy BLE. Cont to practice rollator on carpet next session as pt. declined today and will be ambualting wiht Rollator on carpet at home. Treatment Diagnosis: weakness with decreased fucn tinoal mobiltiy independence due to cellulits chest wall  Prognosis: Good  REQUIRES PT FOLLOW UP: Yes  Activity Tolerance  Activity Tolerance: Patient Tolerated treatment well     Patient Diagnosis(es): There were no encounter diagnoses. has a past medical history of Hepatitis B, Hepatitis C, Hypothyroid, and Other specified disorders of brain. has no past surgical history on file. Restrictions  Restrictions/Precautions  Restrictions/Precautions: Fall Risk  Required Braces or Orthoses?: No  Subjective   General  Chart Reviewed: Yes  Additional Pertinent Hx: Trach for at least 5 years, now iwth cellulitis of chest wall/pain and  need for antibiotics for 6 weeks  Family / Caregiver Present: No  Referring Practitioner: Dr Fred Avina: Pt. states he would do better walking if both legs weren't numb.        Pre Treatment Pain Screening  Pain at present: 6  Scale Used: Numeric Score  Intervention List: Patient able to continue with treatment  Comments / Details: (L knee )    Orientation     Cognition      Objective      Transfers  Sit to Stand: Modified independent  Stand to sit: Modified independent  Comment: on one attempt

## 2020-06-10 NOTE — PROGRESS NOTES
Occupational Therapy  Facility/Department: [facility] MED SURG UNIT  Daily Treatment Note  NAME: Juma Aleman  : 1960  MRN: [id number]    Date of Service: 6/10/2020    Discharge Recommendations:  Home with Home health OT, Home with assist PRN       Assessment   Performance deficits / Impairments: Decreased functional mobility ; Decreased ADL status; Decreased ROM; Decreased strength;Decreased endurance;Decreased sensation;Decreased balance  Assessment: Demos increase endurabce and safety awareness, improving core control for stand balance safety. OT decreasing frequency to 2-3x/wk d/t pt is meeting goals but needs to cont stay at [facility] 2* IV needs. Prognosis: Good  REQUIRES OT FOLLOW UP: Yes  Activity Tolerance  Activity Tolerance: Patient Tolerated treatment well         Patient Diagnosis(es): There were no encounter diagnoses. has a past medical history of Hepatitis B, Hepatitis C, Hypothyroid, and Other specified disorders of brain. has no past surgical history on file.     Restrictions  Restrictions/Precautions  Restrictions/Precautions: Fall Risk  Required Braces or Orthoses?: No  Subjective   General  Chart Reviewed: Yes  Patient assessed for rehabilitation services?: Yes  Family / Caregiver Present: No  Referring Practitioner: Lexy Hays MD  Subjective  Subjective: pt is feeling so/so  General Comment  Comments: willing for tx ,good safety awareness         Objective    ADL  UE Dressing: Setup(nicole slippers)        Balance  Sitting Balance: Modified independent   Standing Balance: Supervision  Standing Balance  Time: 3 min to rest break 0 lob  Activity: dynamic unilateral balloon volly return ,alternate R/L education for wide stance and best body mechanics to in crease stability at stand  Comment: good awareness of body position      Transfers  Sit to stand: Supervision  Stand to sit: Supervision  Transfer Comments: good safety awareness from recliner to stand

## 2020-06-10 NOTE — PROGRESS NOTES
Physical Therapy   Subacute Daily Treatment Note  NAME: Gt Layne  : 1960  MRN: [id number]    Date of Service: 6/10/2020    Patient Diagnosis(es):   Patient Active Problem List    Diagnosis Date Noted    Therapeutic opioid-induced constipation (OIC) 2020    Streptococcal bacteremia     Chest wall pain 2020    Neck pain on left side 2020    Septic arthritis of AC joint ([facility] [address] 2020    Thigh pain, musculoskeletal, left 2020    Neuropathic pain of thigh, left 2020    Cellulitis 2020    Frequent falls 2020    Weakness 2020    Laryngeal carcinoma ([facility] [address] 2020    Gastroesophageal reflux 2020       Past Medical History:   Diagnosis Date    Hepatitis B     Hepatitis C     Hypothyroid     Other specified disorders of brain     diagnosed with [facility] in      History reviewed. No pertinent surgical history. Restrictions  Restrictions/Precautions  Restrictions/Precautions: Fall Risk  Required Braces or Orthoses?: No  Subjective    Objective    Pt present for Interdisciplinary Care Conference this date. See separate note for full report. Pt making advancements in endurance and function. As of this date clearing pt to ambulate in room with ww modified independent when not attached to IV pole. Educated and pt agreed if feeling like needing help with room mobility when not on IV, pt will call for staff assistance. Pt has a Rollator at home on carpet. Pt does not want to work with Rollator on tile floors due to \"too quick with Rollator. Rollator trial deferred per pt request.      Pt also reports son has purchased a hospital bed for pt to use at home. Pt still states via phone as nonverbal/ no voice, that he plans on going home with hospice at discharge once IV anatibiotics stop. Assessment    Pt participates well with therapy. Good safety and judgement.            Discharge Recommendations:  Home with assist PRN, Home with Home health PT versus hospice TBD    Goals  Short term goals  Time Frame for Short term goals: 1-2 weeks  Short term goal 1: transfer sit to stand with SBA and <1-2 vc for safety(GOAL MET )  Short term goal 2: bed mobility with <= SBA and increased time(GOAL MET )  Short term goal 3: amb >= 50ft before fatigued with CGA and ww(GOAL MET )  Short term goal 4: assess curb step wtih ww and Min A of 1(GOAL MET )  Long term goals  Time Frame for Long term goals : 6 weeks or length of IV needs   Long term goal 1: Modified independent bed mobility  Long term goal 2: Modified independent transfers with least AD  Long term goal 3: amb >=100ft with least AD modified independent  Long term goal 4: 1 curb step with AD and SBA to enter/exit home  Long term goal 5: indep with HEP to improve LE strength,   Patient Goals   Patient goals : \" Get stronger to go home. \" via mouthing words    Plan    Plan  Times per week: 5-7x week  Times per day: (QD to BiD)  Plan weeks: 6 weeks or legnth of IVs   Current Treatment Recommendations: Strengthening, Functional Mobility Training, Transfer Training, Balance Training, Endurance Training, Stair training, Gait Training, Pain Management, Patient/Caregiver Education & Training, Safety Education & Training, Manual Therapy - Joint Manipulation, Home Exercise Program, Equipment Evaluation, Education, & procurement, Positioning  Plan Comment: Cont. POC  Safety Devices  Type of devices:  All fall risk precautions in place, Call light within reach, Gait belt, Left in chair     Therapy Time   Individual Concurrent Group Co-treatment   Time In  [address]         Time Out  1145         Minutes  Zo [id number], [id number]

## 2020-06-10 NOTE — PROGRESS NOTES
Uneventful night. No chest pain or palpitation. No abdominal pain, nausea, vomiting, diarrhea, dysuria hematuria. Review of system: 12 system review otherwise negative for acute signs or symptoms. Exam: Awake, alert oriented, in no apparent distress  HEENT: Pale conjunctiva and buccal mucosa. Normal and throat and nose  Neck: Supple, no nuchal rigidity. Tracheostomy. Swelling and tenderness of the left sternoclavicular joint. Endo: No thyromegaly. Vascular: No JVD or carotid bruit. Chest: Clear to auscultation, no dullness to percussion. Heart: Regular rate and rhythm, no extra sounds. No murmur, no rub. Abdomen: Soft, no tenderness, no rebound, no rigidity. Clinically I could not exclude the possibility of intra-abdominal mass or organomegaly. LE: No cyanosis or clubbing, no varices or edema. Neuro: Awake, alert, oriented, normal speech, normal comprehension, normal extension, normal cranial nerves, normal and symmetrical motor and tone examination throughout. MS: No joint effusion or tenderness. Skin: No skin rash, itching, bruising or significant findings.        Assessment and plan: This documentation may or may not be complete     *Septic joint. Sternoclavicular abscess. Antibiotic for 2 more weeks as per ID recommendation.     *Pain, pain management is handled by Dr. Khanh Balderas.     *Hypertension, stable, DC Lasix.     *Laryngeal cancer status post tracheostomy.   Follow-up with ENT.     *Anemia, stable, no evidence of acute blood loss.     Multiple complex medical issues as listed above and others that are not listed all to be addressed in the outpatient setting.

## 2020-06-11 LAB
ANION GAP SERPL CALCULATED.3IONS-SCNC: 10 MEQ/L (ref 9–15)
BASOPHILS ABSOLUTE: 0.1 K/UL (ref 0–0.2)
BASOPHILS RELATIVE PERCENT: 1.7 %
BUN BLDV-MCNC: 10 MG/DL (ref 8–23)
CALCIUM SERPL-MCNC: 9.7 MG/DL (ref 8.5–9.9)
CHLORIDE BLD-SCNC: 103 MEQ/L (ref 95–107)
CO2: 26 MEQ/L (ref 20–31)
CREAT SERPL-MCNC: 0.6 MG/DL (ref 0.7–1.2)
EOSINOPHILS ABSOLUTE: 0.4 K/UL (ref 0–0.7)
EOSINOPHILS RELATIVE PERCENT: 8 %
GFR AFRICAN AMERICAN: >60
GFR NON-AFRICAN AMERICAN: >60
GLUCOSE BLD-MCNC: 83 MG/DL (ref 70–99)
HCT VFR BLD CALC: 33.1 % (ref 42–52)
HEMOGLOBIN: 10.9 G/DL (ref 14–18)
LYMPHOCYTES ABSOLUTE: 1.4 K/UL (ref 1–4.8)
LYMPHOCYTES RELATIVE PERCENT: 31.3 %
MCH RBC QN AUTO: 30.8 PG (ref 27–31.3)
MCHC RBC AUTO-ENTMCNC: 32.8 % (ref 33–37)
MCV RBC AUTO: 94 FL (ref 80–100)
MONOCYTES ABSOLUTE: 0.6 K/UL (ref 0.2–0.8)
MONOCYTES RELATIVE PERCENT: 13.6 %
NEUTROPHILS ABSOLUTE: 2 K/UL (ref 1.4–6.5)
NEUTROPHILS RELATIVE PERCENT: 45.4 %
PDW BLD-RTO: 14.2 % (ref 11.5–14.5)
PLATELET # BLD: 342 K/UL (ref 130–400)
POTASSIUM SERPL-SCNC: 4 MEQ/L (ref 3.4–4.9)
RBC # BLD: 3.52 M/UL (ref 4.7–6.1)
SODIUM BLD-SCNC: 139 MEQ/L (ref 135–144)
WBC # BLD: 4.5 K/UL (ref 4.8–10.8)

## 2020-06-11 PROCEDURE — 97110 THERAPEUTIC EXERCISES: CPT

## 2020-06-11 PROCEDURE — 80048 BASIC METABOLIC PNL TOTAL CA: CPT

## 2020-06-11 PROCEDURE — 2580000003 HC RX 258: Performed by: INTERNAL MEDICINE

## 2020-06-11 PROCEDURE — 6370000000 HC RX 637 (ALT 250 FOR IP): Performed by: ANESTHESIOLOGY

## 2020-06-11 PROCEDURE — 6360000002 HC RX W HCPCS: Performed by: INTERNAL MEDICINE

## 2020-06-11 PROCEDURE — 6370000000 HC RX 637 (ALT 250 FOR IP): Performed by: INTERNAL MEDICINE

## 2020-06-11 PROCEDURE — 97116 GAIT TRAINING THERAPY: CPT

## 2020-06-11 PROCEDURE — 97530 THERAPEUTIC ACTIVITIES: CPT

## 2020-06-11 PROCEDURE — 85025 COMPLETE CBC W/AUTO DIFF WBC: CPT

## 2020-06-11 PROCEDURE — 1200000002 HC SEMI PRIVATE SWING BED

## 2020-06-11 RX ADMIN — OXYCODONE HYDROCHLORIDE 7.5 MG: 5 TABLET ORAL at 06:04

## 2020-06-11 RX ADMIN — OXYCODONE HYDROCHLORIDE 7.5 MG: 5 TABLET ORAL at 02:03

## 2020-06-11 RX ADMIN — GABAPENTIN 300 MG: 300 CAPSULE ORAL at 19:46

## 2020-06-11 RX ADMIN — OXYCODONE HYDROCHLORIDE 7.5 MG: 5 TABLET ORAL at 10:13

## 2020-06-11 RX ADMIN — PIPERACILLIN AND TAZOBACTAM 3.38 G: 3; .375 INJECTION, POWDER, LYOPHILIZED, FOR SOLUTION INTRAVENOUS at 14:09

## 2020-06-11 RX ADMIN — LEVOTHYROXINE SODIUM 75 MCG: 75 TABLET ORAL at 06:04

## 2020-06-11 RX ADMIN — OXYCODONE HYDROCHLORIDE 7.5 MG: 5 TABLET ORAL at 18:10

## 2020-06-11 RX ADMIN — Medication 10 ML: at 09:28

## 2020-06-11 RX ADMIN — PIPERACILLIN AND TAZOBACTAM 3.38 G: 3; .375 INJECTION, POWDER, FOR SOLUTION INTRAVENOUS at 22:07

## 2020-06-11 RX ADMIN — OXYCODONE HYDROCHLORIDE 7.5 MG: 5 TABLET ORAL at 22:07

## 2020-06-11 RX ADMIN — PIPERACILLIN AND TAZOBACTAM 3.38 G: 3; .375 INJECTION, POWDER, LYOPHILIZED, FOR SOLUTION INTRAVENOUS at 06:04

## 2020-06-11 RX ADMIN — FAMOTIDINE 20 MG: 20 TABLET ORAL at 19:47

## 2020-06-11 RX ADMIN — GABAPENTIN 300 MG: 300 CAPSULE ORAL at 09:25

## 2020-06-11 RX ADMIN — OXYCODONE HYDROCHLORIDE 7.5 MG: 5 TABLET ORAL at 14:11

## 2020-06-11 RX ADMIN — FAMOTIDINE 20 MG: 20 TABLET ORAL at 09:25

## 2020-06-11 RX ADMIN — DOCUSATE SODIUM AND SENNOSIDES 2 TABLET: 50; 8.6 TABLET, FILM COATED ORAL at 09:25

## 2020-06-11 RX ADMIN — ENOXAPARIN SODIUM 40 MG: 40 INJECTION SUBCUTANEOUS at 09:25

## 2020-06-11 RX ADMIN — FINASTERIDE 5 MG: 5 TABLET, FILM COATED ORAL at 09:25

## 2020-06-11 RX ADMIN — GABAPENTIN 300 MG: 300 CAPSULE ORAL at 14:12

## 2020-06-11 RX ADMIN — Medication 10 ML: at 19:47

## 2020-06-11 RX ADMIN — MELOXICAM 7.5 MG: 7.5 TABLET ORAL at 09:25

## 2020-06-11 ASSESSMENT — PAIN DESCRIPTION - PROGRESSION
CLINICAL_PROGRESSION: GRADUALLY IMPROVING
CLINICAL_PROGRESSION: GRADUALLY WORSENING
CLINICAL_PROGRESSION: GRADUALLY IMPROVING
CLINICAL_PROGRESSION: GRADUALLY WORSENING
CLINICAL_PROGRESSION: GRADUALLY IMPROVING

## 2020-06-11 ASSESSMENT — PAIN SCALES - GENERAL
PAINLEVEL_OUTOF10: 5
PAINLEVEL_OUTOF10: 3
PAINLEVEL_OUTOF10: 5
PAINLEVEL_OUTOF10: 5
PAINLEVEL_OUTOF10: 7
PAINLEVEL_OUTOF10: 3
PAINLEVEL_OUTOF10: 7
PAINLEVEL_OUTOF10: 0
PAINLEVEL_OUTOF10: 8
PAINLEVEL_OUTOF10: 7

## 2020-06-11 ASSESSMENT — PAIN DESCRIPTION - PAIN TYPE
TYPE: ACUTE PAIN

## 2020-06-11 ASSESSMENT — PAIN - FUNCTIONAL ASSESSMENT
PAIN_FUNCTIONAL_ASSESSMENT: ACTIVITIES ARE NOT PREVENTED

## 2020-06-11 ASSESSMENT — PAIN DESCRIPTION - LOCATION
LOCATION: THROAT

## 2020-06-11 ASSESSMENT — PAIN DESCRIPTION - ORIENTATION
ORIENTATION: LEFT

## 2020-06-11 ASSESSMENT — PAIN DESCRIPTION - DESCRIPTORS
DESCRIPTORS: ACHING;SORE
DESCRIPTORS: SORE
DESCRIPTORS: SORE
DESCRIPTORS: ACHING;SORE

## 2020-06-11 ASSESSMENT — PAIN DESCRIPTION - FREQUENCY
FREQUENCY: CONTINUOUS

## 2020-06-11 ASSESSMENT — PAIN DESCRIPTION - ONSET
ONSET: ON-GOING

## 2020-06-11 ASSESSMENT — PAIN SCALES - WONG BAKER: WONGBAKER_NUMERICALRESPONSE: 0

## 2020-06-11 NOTE — PROGRESS NOTES
2020    TELEHEALTH EVALUATION -- Audio/Visual (During VCCNU-54 public health emergency)      Ashley Peña (:  1960) has requested an audio/video evaluation for the following concern(s):    L chest wall cellulitis    Due to the COVID-19 outbreak, patient's office visit was converted to a virtual visit. Patient was contacted and agreed to proceed with a virtual visit with me via Doxy. me with my location at the office. Patient reports that they are located at home. The risks and benefits of converting to a virtual visit were discussed in light of the current infectious disease epidemic. Services were provided through an audio/video synchronous discussion virtually to substitute for in person clinic visit. THIS virtual visit was conducted via interactive/real-time audio/video. Due to this being a TeleHealth encounter, evaluation of the following organ systems is limited: Vitals/Constitutional/EENT/Resp/CV/GI//MS/Neuro/Skin/Heme-Lymph-Imm.}    Pursuant to the emergency declaration under the [address] [place] [address], [facility] waiver authority and the [employer] Act, this Virtual Visit was conducted, with patient's consent, to reduce the patient's risk of exposure to COVID-19 and provide care for the patient. Infectious Disease Progress Note       2020    Patient is a hospital followup regarding  · L chest wall cellulitis with abscess and probable septic arthritis of L sternoclavicular joint  · H/O laryngeal cancer with Trach  · Strep bacteremia    Subjectively, no new complaints at this time except for residual L chest wall pain. No more erythema. Sitting in a chair.  NAD  General: Patient in no acute distress, cooperative  Skin: no new rashes   HEENT: EOMI, MMM, Neck is supple  Heart: S1 S2  Lungs:bilaterally clear  Abdomen: soft, ND, +BS, NTTP  Extrem:+pulses, no calf pain  Neuro exam: CN II-XII intact      Lab Results Component Value Date    WBC 4.5 (L) 06/11/2020    HGB 10.9 (L) 06/11/2020    HCT 33.1 (L) 06/11/2020    MCV 94.0 06/11/2020     06/11/2020     Lab Results   Component Value Date     06/11/2020    K 4.0 06/11/2020    K 3.6 05/20/2020     06/11/2020    CO2 26 06/11/2020    BUN 10 06/11/2020    CREATININE 0.60 06/11/2020    GLUCOSE 83 06/11/2020    CALCIUM 9.7 06/11/2020        WBC trends are being monitored. Antibiotic doses are being adjusted per most recent renal labs. Vitals:    06/11/20 0608   BP: (!) 104/57   Pulse: 64   Resp: 16   Temp: 97.7 °F (36.5 °C)   SpO2: 98%           Patient Active Problem List   Diagnosis    Cellulitis    Frequent falls    Weakness    Laryngeal carcinoma (HCC)    Gastroesophageal reflux    Chest wall pain    Neck pain on left side    Septic arthritis of AC joint (HCC)    Thigh pain, musculoskeletal, left    Neuropathic pain of thigh, left    Streptococcal bacteremia    Therapeutic opioid-induced constipation (OIC)           ASSESSMENT:  · L chest wall cellulitis with abscess and probable septic arthritis of L sternoclavicular joint  · H/O laryngeal cancer with Trach  · Strep bacteremia - resolved. PLAN:  IV abx to dc on 6/14      Imaging and labs were reviewed per medical records and any ID pertinent labs were addressed with the patient.        Herminia Osorio DO

## 2020-06-11 NOTE — PROGRESS NOTES
Pt assessment done and am meds given. Trach in place and double lumen RUE PICC. Pt x1 SBA with a walker. Pt resting in the chair with the call light within reach.

## 2020-06-12 PROCEDURE — 97110 THERAPEUTIC EXERCISES: CPT

## 2020-06-12 PROCEDURE — 2580000003 HC RX 258: Performed by: INTERNAL MEDICINE

## 2020-06-12 PROCEDURE — 6370000000 HC RX 637 (ALT 250 FOR IP): Performed by: ANESTHESIOLOGY

## 2020-06-12 PROCEDURE — 6370000000 HC RX 637 (ALT 250 FOR IP): Performed by: INTERNAL MEDICINE

## 2020-06-12 PROCEDURE — 97116 GAIT TRAINING THERAPY: CPT

## 2020-06-12 PROCEDURE — 6360000002 HC RX W HCPCS: Performed by: INTERNAL MEDICINE

## 2020-06-12 PROCEDURE — 1200000002 HC SEMI PRIVATE SWING BED

## 2020-06-12 RX ADMIN — PIPERACILLIN AND TAZOBACTAM 3.38 G: 3; .375 INJECTION, POWDER, FOR SOLUTION INTRAVENOUS at 14:02

## 2020-06-12 RX ADMIN — LEVOTHYROXINE SODIUM 75 MCG: 75 TABLET ORAL at 06:08

## 2020-06-12 RX ADMIN — OXYCODONE HYDROCHLORIDE 7.5 MG: 5 TABLET ORAL at 14:02

## 2020-06-12 RX ADMIN — GABAPENTIN 300 MG: 300 CAPSULE ORAL at 14:02

## 2020-06-12 RX ADMIN — Medication 10 ML: at 20:04

## 2020-06-12 RX ADMIN — FAMOTIDINE 20 MG: 20 TABLET ORAL at 09:06

## 2020-06-12 RX ADMIN — OXYCODONE HYDROCHLORIDE 7.5 MG: 5 TABLET ORAL at 06:08

## 2020-06-12 RX ADMIN — ENOXAPARIN SODIUM 40 MG: 40 INJECTION SUBCUTANEOUS at 09:06

## 2020-06-12 RX ADMIN — PIPERACILLIN AND TAZOBACTAM 3.38 G: 3; .375 INJECTION, POWDER, FOR SOLUTION INTRAVENOUS at 22:14

## 2020-06-12 RX ADMIN — OXYCODONE HYDROCHLORIDE 7.5 MG: 5 TABLET ORAL at 10:04

## 2020-06-12 RX ADMIN — MELOXICAM 7.5 MG: 7.5 TABLET ORAL at 09:06

## 2020-06-12 RX ADMIN — GABAPENTIN 300 MG: 300 CAPSULE ORAL at 09:06

## 2020-06-12 RX ADMIN — GABAPENTIN 300 MG: 300 CAPSULE ORAL at 20:03

## 2020-06-12 RX ADMIN — FAMOTIDINE 20 MG: 20 TABLET ORAL at 20:03

## 2020-06-12 RX ADMIN — OXYCODONE HYDROCHLORIDE 7.5 MG: 5 TABLET ORAL at 18:16

## 2020-06-12 RX ADMIN — OXYCODONE HYDROCHLORIDE 7.5 MG: 5 TABLET ORAL at 02:03

## 2020-06-12 RX ADMIN — FINASTERIDE 5 MG: 5 TABLET, FILM COATED ORAL at 09:06

## 2020-06-12 RX ADMIN — Medication 10 ML: at 14:03

## 2020-06-12 RX ADMIN — PIPERACILLIN AND TAZOBACTAM 3.38 G: 3; .375 INJECTION, POWDER, FOR SOLUTION INTRAVENOUS at 06:08

## 2020-06-12 RX ADMIN — OXYCODONE HYDROCHLORIDE 7.5 MG: 5 TABLET ORAL at 22:14

## 2020-06-12 ASSESSMENT — PAIN SCALES - GENERAL
PAINLEVEL_OUTOF10: 8
PAINLEVEL_OUTOF10: 7
PAINLEVEL_OUTOF10: 8
PAINLEVEL_OUTOF10: 0
PAINLEVEL_OUTOF10: 8

## 2020-06-12 ASSESSMENT — PAIN DESCRIPTION - PROGRESSION
CLINICAL_PROGRESSION: GRADUALLY IMPROVING

## 2020-06-12 ASSESSMENT — PAIN SCALES - WONG BAKER
WONGBAKER_NUMERICALRESPONSE: 0

## 2020-06-12 NOTE — PROGRESS NOTES
level tile. pt. had one episode of L knee buckling able to self correct with inc distance   Distance: 200', 145'   Stairs/Curb  Stairs?: No     Balance  Posture: Fair  Sitting - Static: Good  Sitting - Dynamic: Fair  Standing - Static: Fair  Standing - Dynamic: Fair;+  Exercises  Hamstring Sets: seated RTB x 20 hold 3 sec   Gluteal Sets: x20  Hip Flexion: seated x 20 hold 5 sec   Hip Abduction: seated x20 hold 5 secRTB   Knee Long Arc Quad: seated x20 hold 5 sec1#   Ankle Pumps: seated x30   Comments: ball squeezes x20 hold 5 sec                         G-Code     OutComes Score                                                     AM-PAC Score             Goals  Short term goals  Time Frame for Short term goals: 1-2 weeks  Short term goal 1: transfer sit to stand with SBA and <1-2 vc for safety(GOAL MET )  Short term goal 2: bed mobility with <= SBA and increased time(GOAL MET )  Short term goal 3: amb >= 50ft before fatigued with CGA and ww(GOAL MET )  Short term goal 4: assess curb step wtih ww and Min A of 1(GOAL MET )  Long term goals  Time Frame for Long term goals : 6 weeks or length of IV needs   Long term goal 1: Modified independent bed mobilityGOAL MET   Long term goal 2: Modified independent transfers with least AD  Long term goal 3: amb >=100ft with least AD modified independent  Long term goal 4: 1 curb step with AD and SBA to enter/exit home  Long term goal 5: indep with HEP to improve LE strength,   Patient Goals   Patient goals : \" Get stronger to go home. \" via mouthing words    Plan    Plan  Times per week: 5-7x week  Times per day: (QD to BiD)  Plan weeks: 6 weeks or legnth of IVs   Current Treatment Recommendations: Strengthening, Functional Mobility Training, Transfer Training, Balance Training, Endurance Training, Stair training, Gait Training, Pain Management, Patient/Caregiver Education & Training, Safety Education & Training, Manual Therapy - Joint Manipulation, Home Exercise Program,

## 2020-06-12 NOTE — DISCHARGE INSTR - COC
weight bearing restirctions  Other Medical Equipment (for information only, NOT a DME order):  walker  Other Treatments: ***    Patient's personal belongings (please select all that are sent with patient):  Glasses    RN SIGNATURE:  Electronically signed by Josefina Farley RN on 6/15/20 at 10:11 AM EDT    CASE MANAGEMENT/SOCIAL WORK SECTION    Inpatient Status Date: 5/19/20    Readmission Risk Assessment Score:  Readmission Risk              Risk of Unplanned Readmission:        14           Discharging to Facility/ Agency   · Name: [facility]   · Phone: [phone]  · Fax: [phone]      / signature: Electronically signed by NANCY Concepcion on 6/12/2020 at 11:46 AM      PHYSICIAN SECTION    Prognosis: Poor    Condition at Discharge: Stable    Rehab Potential (if transferring to Rehab): Poor    Recommended Labs or Other Treatments After Discharge: none     Physician Certification: I certify the above information and transfer of Kristy De La Cruz  is necessary for the continuing treatment of the diagnosis listed and that he requires Hospice for less 30 days.      Update Admission H&P: No change in H&P    PHYSICIAN SIGNATURE:  Electronically signed by Dev Murphy DO on 6/15/20 at 11:50 AM EDT

## 2020-06-13 PROCEDURE — 1200000002 HC SEMI PRIVATE SWING BED

## 2020-06-13 PROCEDURE — 6370000000 HC RX 637 (ALT 250 FOR IP): Performed by: INTERNAL MEDICINE

## 2020-06-13 PROCEDURE — 6370000000 HC RX 637 (ALT 250 FOR IP): Performed by: ANESTHESIOLOGY

## 2020-06-13 PROCEDURE — 2580000003 HC RX 258: Performed by: INTERNAL MEDICINE

## 2020-06-13 PROCEDURE — 6360000002 HC RX W HCPCS: Performed by: INTERNAL MEDICINE

## 2020-06-13 PROCEDURE — 97530 THERAPEUTIC ACTIVITIES: CPT | Performed by: PHYSICAL THERAPIST

## 2020-06-13 PROCEDURE — 97110 THERAPEUTIC EXERCISES: CPT | Performed by: PHYSICAL THERAPIST

## 2020-06-13 RX ADMIN — OXYCODONE HYDROCHLORIDE 7.5 MG: 5 TABLET ORAL at 13:53

## 2020-06-13 RX ADMIN — PIPERACILLIN AND TAZOBACTAM 3.38 G: 3; .375 INJECTION, POWDER, FOR SOLUTION INTRAVENOUS at 13:53

## 2020-06-13 RX ADMIN — FINASTERIDE 5 MG: 5 TABLET, FILM COATED ORAL at 09:06

## 2020-06-13 RX ADMIN — OXYCODONE HYDROCHLORIDE 7.5 MG: 5 TABLET ORAL at 06:21

## 2020-06-13 RX ADMIN — OXYCODONE HYDROCHLORIDE 7.5 MG: 5 TABLET ORAL at 22:06

## 2020-06-13 RX ADMIN — Medication 10 ML: at 21:11

## 2020-06-13 RX ADMIN — OXYCODONE HYDROCHLORIDE 7.5 MG: 5 TABLET ORAL at 02:13

## 2020-06-13 RX ADMIN — FAMOTIDINE 20 MG: 20 TABLET ORAL at 09:06

## 2020-06-13 RX ADMIN — GABAPENTIN 300 MG: 300 CAPSULE ORAL at 09:06

## 2020-06-13 RX ADMIN — ENOXAPARIN SODIUM 40 MG: 40 INJECTION SUBCUTANEOUS at 09:06

## 2020-06-13 RX ADMIN — OXYCODONE HYDROCHLORIDE 7.5 MG: 5 TABLET ORAL at 10:09

## 2020-06-13 RX ADMIN — LEVOTHYROXINE SODIUM 75 MCG: 75 TABLET ORAL at 06:21

## 2020-06-13 RX ADMIN — GABAPENTIN 300 MG: 300 CAPSULE ORAL at 20:35

## 2020-06-13 RX ADMIN — FAMOTIDINE 20 MG: 20 TABLET ORAL at 20:35

## 2020-06-13 RX ADMIN — Medication 10 ML: at 13:53

## 2020-06-13 RX ADMIN — OXYCODONE HYDROCHLORIDE 7.5 MG: 5 TABLET ORAL at 18:05

## 2020-06-13 RX ADMIN — PIPERACILLIN AND TAZOBACTAM 3.38 G: 3; .375 INJECTION, POWDER, FOR SOLUTION INTRAVENOUS at 21:10

## 2020-06-13 RX ADMIN — MELOXICAM 7.5 MG: 7.5 TABLET ORAL at 09:06

## 2020-06-13 RX ADMIN — GABAPENTIN 300 MG: 300 CAPSULE ORAL at 13:53

## 2020-06-13 RX ADMIN — PIPERACILLIN AND TAZOBACTAM 3.38 G: 3; .375 INJECTION, POWDER, FOR SOLUTION INTRAVENOUS at 06:21

## 2020-06-13 RX ADMIN — DOCUSATE SODIUM AND SENNOSIDES 2 TABLET: 50; 8.6 TABLET, FILM COATED ORAL at 09:06

## 2020-06-13 ASSESSMENT — PAIN SCALES - GENERAL
PAINLEVEL_OUTOF10: 8
PAINLEVEL_OUTOF10: 5
PAINLEVEL_OUTOF10: 7
PAINLEVEL_OUTOF10: 5
PAINLEVEL_OUTOF10: 8
PAINLEVEL_OUTOF10: 8
PAINLEVEL_OUTOF10: 6
PAINLEVEL_OUTOF10: 8
PAINLEVEL_OUTOF10: 8

## 2020-06-13 ASSESSMENT — PAIN DESCRIPTION - LOCATION: LOCATION: THROAT

## 2020-06-13 ASSESSMENT — PAIN - FUNCTIONAL ASSESSMENT: PAIN_FUNCTIONAL_ASSESSMENT: ACTIVITIES ARE NOT PREVENTED

## 2020-06-13 ASSESSMENT — PAIN DESCRIPTION - PROGRESSION
CLINICAL_PROGRESSION: GRADUALLY IMPROVING

## 2020-06-13 ASSESSMENT — PAIN SCALES - WONG BAKER
WONGBAKER_NUMERICALRESPONSE: 0

## 2020-06-13 ASSESSMENT — PAIN DESCRIPTION - ONSET: ONSET: ON-GOING

## 2020-06-13 ASSESSMENT — PAIN DESCRIPTION - DESCRIPTORS: DESCRIPTORS: ACHING

## 2020-06-13 ASSESSMENT — PAIN DESCRIPTION - PAIN TYPE: TYPE: CHRONIC PAIN

## 2020-06-13 ASSESSMENT — PAIN DESCRIPTION - FREQUENCY: FREQUENCY: CONTINUOUS

## 2020-06-13 NOTE — PROGRESS NOTES
Physical Therapy  Facility/Department: [facility] MED SURG UNIT  Daily Treatment Note  NAME: Juma Aleman  : 1960  MRN: [id number]    Date of Service: 2020    Discharge Recommendations:  Home with assist PRN, Home with Home health PT        Assessment   Body structures, Functions, Activity limitations: Decreased endurance;Decreased functional mobility ; Decreased strength; Increased pain  Assessment: pt appropriately challenged with program today. Displays weaknesses and instabilities with balance and gait however able to self correct. Ambulating with steady pace. Continued skilled care to address strength stability balance gait and restore safe functional mobility. Treatment Diagnosis: weakness with decreased fucn tinoal mobiltiy independence due to cellulits chest wall  Prognosis: Good  REQUIRES PT FOLLOW UP: Yes     Patient Diagnosis(es): There were no encounter diagnoses. has a past medical history of Hepatitis B, Hepatitis C, Hypothyroid, and Other specified disorders of brain. has no past surgical history on file. Restrictions  Restrictions/Precautions  Restrictions/Precautions: Fall Risk  Required Braces or Orthoses?: No  Subjective   General  Chart Reviewed:  Yes  Additional Pertinent Hx: Trach for at least 5 years, now iwth cellulitis of chest wall/pain and  need for antibiotics for 6 weeks  Referring Practitioner: Dr Jade Merchant: Pt reading paper reporting pain 7/10     Pre Treatment Pain Screening  Pain at present: 7  Scale Used: Numeric Score  Intervention List: Patient able to continue with treatment  Comments / Details: trach and knee     Orientation     Cognition      Objective   Bed mobility  Scooting: Stand by assistance  Transfers  Sit to Stand: Modified independent  Stand to sit: Modified independent  Bed to Chair: Contact guard assistance  Ambulation 1  Surface: level tile  Device: Rolling Walker  Assistance: Stand by assistance;Contact guard assistance  Quality

## 2020-06-14 PROCEDURE — 6370000000 HC RX 637 (ALT 250 FOR IP): Performed by: INTERNAL MEDICINE

## 2020-06-14 PROCEDURE — 1200000002 HC SEMI PRIVATE SWING BED

## 2020-06-14 PROCEDURE — 2580000003 HC RX 258: Performed by: INTERNAL MEDICINE

## 2020-06-14 PROCEDURE — 97110 THERAPEUTIC EXERCISES: CPT

## 2020-06-14 PROCEDURE — 6360000002 HC RX W HCPCS: Performed by: INTERNAL MEDICINE

## 2020-06-14 PROCEDURE — 97116 GAIT TRAINING THERAPY: CPT

## 2020-06-14 PROCEDURE — 6370000000 HC RX 637 (ALT 250 FOR IP): Performed by: ANESTHESIOLOGY

## 2020-06-14 RX ORDER — TRAMADOL HYDROCHLORIDE 50 MG/1
50 TABLET ORAL EVERY 6 HOURS PRN
Status: DISCONTINUED | OUTPATIENT
Start: 2020-06-14 | End: 2020-06-15 | Stop reason: HOSPADM

## 2020-06-14 RX ORDER — MELOXICAM 7.5 MG/1
15 TABLET ORAL DAILY
Qty: 30 TABLET | Refills: 3 | Status: SHIPPED | OUTPATIENT
Start: 2020-06-15

## 2020-06-14 RX ORDER — TRAMADOL HYDROCHLORIDE 50 MG/1
50 TABLET ORAL EVERY 6 HOURS PRN
Qty: 28 TABLET | Refills: 0 | Status: SHIPPED | OUTPATIENT
Start: 2020-06-14 | End: 2020-06-21

## 2020-06-14 RX ORDER — ACETAMINOPHEN 325 MG/1
650 TABLET ORAL EVERY 6 HOURS PRN
Qty: 120 TABLET | Refills: 3 | Status: SHIPPED | OUTPATIENT
Start: 2020-06-14

## 2020-06-14 RX ORDER — GABAPENTIN 300 MG/1
300 CAPSULE ORAL 3 TIMES DAILY
Qty: 90 CAPSULE | Refills: 0 | Status: SHIPPED | OUTPATIENT
Start: 2020-06-14 | End: 2020-07-14

## 2020-06-14 RX ADMIN — GABAPENTIN 300 MG: 300 CAPSULE ORAL at 08:53

## 2020-06-14 RX ADMIN — LEVOTHYROXINE SODIUM 75 MCG: 75 TABLET ORAL at 06:04

## 2020-06-14 RX ADMIN — OXYCODONE HYDROCHLORIDE 7.5 MG: 5 TABLET ORAL at 14:56

## 2020-06-14 RX ADMIN — FAMOTIDINE 20 MG: 20 TABLET ORAL at 19:45

## 2020-06-14 RX ADMIN — GABAPENTIN 300 MG: 300 CAPSULE ORAL at 14:56

## 2020-06-14 RX ADMIN — TRAMADOL HYDROCHLORIDE 50 MG: 50 TABLET, FILM COATED ORAL at 19:48

## 2020-06-14 RX ADMIN — PIPERACILLIN AND TAZOBACTAM 3.38 G: 3; .375 INJECTION, POWDER, FOR SOLUTION INTRAVENOUS at 05:07

## 2020-06-14 RX ADMIN — FINASTERIDE 5 MG: 5 TABLET, FILM COATED ORAL at 08:52

## 2020-06-14 RX ADMIN — OXYCODONE HYDROCHLORIDE 7.5 MG: 5 TABLET ORAL at 06:09

## 2020-06-14 RX ADMIN — ENOXAPARIN SODIUM 40 MG: 40 INJECTION SUBCUTANEOUS at 08:52

## 2020-06-14 RX ADMIN — Medication 10 ML: at 08:52

## 2020-06-14 RX ADMIN — PIPERACILLIN AND TAZOBACTAM 3.38 G: 3; .375 INJECTION, POWDER, FOR SOLUTION INTRAVENOUS at 14:56

## 2020-06-14 RX ADMIN — FAMOTIDINE 20 MG: 20 TABLET ORAL at 08:52

## 2020-06-14 RX ADMIN — OXYCODONE HYDROCHLORIDE 7.5 MG: 5 TABLET ORAL at 02:09

## 2020-06-14 RX ADMIN — MELOXICAM 7.5 MG: 7.5 TABLET ORAL at 08:52

## 2020-06-14 RX ADMIN — GABAPENTIN 300 MG: 300 CAPSULE ORAL at 19:45

## 2020-06-14 RX ADMIN — OXYCODONE HYDROCHLORIDE 7.5 MG: 5 TABLET ORAL at 10:31

## 2020-06-14 ASSESSMENT — PAIN DESCRIPTION - PROGRESSION
CLINICAL_PROGRESSION: GRADUALLY IMPROVING

## 2020-06-14 ASSESSMENT — ENCOUNTER SYMPTOMS
GASTROINTESTINAL NEGATIVE: 1
RESPIRATORY NEGATIVE: 1
TROUBLE SWALLOWING: 1
BACK PAIN: 0
ALLERGIC/IMMUNOLOGIC NEGATIVE: 1
EYES NEGATIVE: 1

## 2020-06-14 ASSESSMENT — PAIN SCALES - GENERAL
PAINLEVEL_OUTOF10: 6
PAINLEVEL_OUTOF10: 8
PAINLEVEL_OUTOF10: 0
PAINLEVEL_OUTOF10: 8
PAINLEVEL_OUTOF10: 7
PAINLEVEL_OUTOF10: 9
PAINLEVEL_OUTOF10: 7

## 2020-06-14 ASSESSMENT — PAIN SCALES - WONG BAKER
WONGBAKER_NUMERICALRESPONSE: 0

## 2020-06-14 NOTE — PROGRESS NOTES
Fair  Exercises  Quad Sets: x 20  Gluteal Sets: x20  Hip Flexion: seated x 20 hold 5 sec   Hip Abduction: seated x 10 manual resistance (adduction also)  Knee Long Arc Quad: seated x20 hold 5 sec  Ankle Pumps: seated x30   Other exercises  Other exercises?: Yes  Other exercises 2: Static stand , 180° turn                        G-Code     OutComes Score                                                     AM-PAC Score             Goals  Short term goals  Time Frame for Short term goals: 1-2 weeks  Short term goal 1: transfer sit to stand with SBA and <1-2 vc for safety  Short term goal 2: bed mobility with <= SBA and increased time  Short term goal 3: amb >= 50ft before fatigued with CGA and ww  Short term goal 4: assess curb step wtih ww and Min A of 1  Long term goals  Time Frame for Long term goals : 6 weeks or length of IV needs   Long term goal 1: Modified independent bed mobilityGOAL MET   Long term goal 2: Modified independent transfers with least AD  Long term goal 3: amb >=100ft with least AD modified independent  Long term goal 4: 1 curb step with AD and SBA to enter/exit home  Long term goal 5: indep with HEP to improve LE strength,   Patient Goals   Patient goals : \" Get stronger to go home. \" via mouthing words    Plan    Plan  Times per week: 5-7x week  Times per day: (1-2x/day)  Plan weeks: 6 weeks or legnth of IVs   Current Treatment Recommendations: Strengthening, Functional Mobility Training, Transfer Training, Balance Training, Endurance Training, Stair training, Gait Training, Pain Management, Patient/Caregiver Education & Training, Safety Education & Training, Manual Therapy - Joint Manipulation, Home Exercise Program, Equipment Evaluation, Education, & procurement, Positioning  Plan Comment: Cont.  POC  Safety Devices  Type of devices: Call light within reach, Left in chair     Therapy Time   Individual Concurrent Group Co-treatment   Time In  130         Time Out  200         Minutes  30 Deyanira Bella PTA  License and Documentation Cosign  Therapy License Number: [id number]

## 2020-06-14 NOTE — PROGRESS NOTES
05/24/20 0821    magnesium hydroxide (MILK OF MAGNESIA) 400 MG/5ML suspension 30 mL  30 mL Oral Daily PRN Mushtaq Mas MD        fentaNYL (DURAGESIC) 12 MCG/HR 1 patch  1 patch Transdermal Q72H Yvette Bauer MD   1 patch at 06/13/20 2038    meloxicam (MOBIC) tablet 7.5 mg  7.5 mg Oral Daily Yvette Bauer MD   7.5 mg at 06/14/20 0852    enoxaparin (LOVENOX) injection 40 mg  40 mg Subcutaneous Daily [facility] DO   40 mg at 06/14/20 0852    promethazine (PHENERGAN) tablet 12.5 mg  12.5 mg Oral Q6H PRN [facility] DO        Or    on[facility]etron [facility]) injection 4 mg  4 mg Intravenous Q6H PRN [facility] DO        famotidine (PEPCID) tablet 20 mg  20 mg Oral BID [facility] DO   20 mg at 06/14/20 6946    levothyroxine (SYNTHROID) tablet 75 mcg  75 mcg Oral Daily [facility] DO   75 mcg at 06/14/20 0604    lidocaine (LMX) 4 % cream   Topical Q4H PRN [facility] DO             ALLERGIES:  Patient has no known allergies. Review of Systems   Constitutional: Positive for activity change. Negative for appetite change, chills, diaphoresis, fatigue, fever and unexpected weight change. HENT: Positive for trouble swallowing. Eyes: Negative. Respiratory: Negative. Cardiovascular: Negative. Gastrointestinal: Negative. Endocrine: Negative. Genitourinary: Negative. Musculoskeletal: Positive for joint swelling, myalgias, neck pain and neck stiffness. Negative for arthralgias, back pain and gait problem. Skin: Negative. Allergic/Immunologic: Negative. Neurological: Negative. Hematological: Negative. Psychiatric/Behavioral: Negative. All other systems reviewed and are negative. OBJECTIVE  PHYSICAL EXAM:  /61   Pulse 69   Temp 98.8 °F (37.1 °C) (Oral)   Resp 20   Ht 5' 3\" (1.6 m)   Wt 134 lb 14.7 oz (61.2 kg)   SpO2 95%   BMI 23.90 kg/m²   Body mass index is 23.9 kg/m².     CONSTITUTIONAL:  awake, alert, cooperative, no apparent distress, 2% chlorhexidine for cutaneous antisepsis. A sterile ultrasound technique with sterile gel and sterile probe covers was also utilized. A pre-procedure time out was performed in order to assure the correct patient and procedure. Local anesthetic was administered. A peripheral vein was accessed with sonographic guidance. A sonographic spot image was obtained for documentation. A guidewire was advanced into the vein with fluoroscopic guidance and a sheath was placed over the guidewire. A 5-[place]vian dual-lumen PICC was advanced through the sheath, up the arm and into the central vasculature. It was positioned appropriately. The sheath was removed. The catheter was shown to aspirate and infuse properly. The flange of the catheter was affixed to the arm using a PICC securement device. A spot image of the chest showed the tip of the PICC line to lie in the superior vena cava. The patient tolerated the procedure well and without complications. Number of films: 3 Fluoroscopy time: 6.1 seconds CONCLUSION: SUCCESSFUL LEFT PICC PLACEMENT WITHOUT IMMEDIATE COMPLICATIONS. Tinnie De Silvano 40 Problems    Diagnosis Date Noted    Therapeutic opioid-induced constipation (OIC) [K59.03, T40.2X5A] 05/23/2020    Streptococcal bacteremia [R78.81, B95.5]     Chest wall pain [R07.89] 05/20/2020    Neck pain on left side [M54.2] 05/20/2020    Septic arthritis of AC joint ([address] [M00.9] 05/20/2020    Thigh pain, musculoskeletal, left [M79.652] 05/20/2020    Neuropathic pain of thigh, left [M79.2] 05/20/2020    Laryngeal carcinoma ([address] [C32.9] 05/14/2020    Cellulitis [L03.90] 05/14/2020    Frequent falls [R29.6] 05/14/2020    Weakness [R53.1] 05/14/2020    Gastroesophageal reflux [K21.9] 05/14/2020        Patient has successful antibiotic treatment, much improved clinically on the sternoclavicular osteo-myelitis, pain management has been working well for him.   I will start weaning him down

## 2020-06-15 VITALS
HEART RATE: 66 BPM | SYSTOLIC BLOOD PRESSURE: 124 MMHG | WEIGHT: 134.92 LBS | TEMPERATURE: 98.4 F | HEIGHT: 63 IN | OXYGEN SATURATION: 96 % | RESPIRATION RATE: 18 BRPM | DIASTOLIC BLOOD PRESSURE: 66 MMHG | BODY MASS INDEX: 23.91 KG/M2

## 2020-06-15 LAB
ANION GAP SERPL CALCULATED.3IONS-SCNC: 10 MEQ/L (ref 9–15)
BASOPHILS ABSOLUTE: 0.1 K/UL (ref 0–0.2)
BASOPHILS RELATIVE PERCENT: 1.9 %
BUN BLDV-MCNC: 9 MG/DL (ref 8–23)
CALCIUM SERPL-MCNC: 9.8 MG/DL (ref 8.5–9.9)
CHLORIDE BLD-SCNC: 102 MEQ/L (ref 95–107)
CO2: 27 MEQ/L (ref 20–31)
CREAT SERPL-MCNC: 0.58 MG/DL (ref 0.7–1.2)
EOSINOPHILS ABSOLUTE: 0.4 K/UL (ref 0–0.7)
EOSINOPHILS RELATIVE PERCENT: 7.7 %
GFR AFRICAN AMERICAN: >60
GFR NON-AFRICAN AMERICAN: >60
GLUCOSE BLD-MCNC: 91 MG/DL (ref 70–99)
HCT VFR BLD CALC: 34.9 % (ref 42–52)
HEMOGLOBIN: 11.6 G/DL (ref 14–18)
LYMPHOCYTES ABSOLUTE: 1.5 K/UL (ref 1–4.8)
LYMPHOCYTES RELATIVE PERCENT: 31.5 %
MCH RBC QN AUTO: 31.1 PG (ref 27–31.3)
MCHC RBC AUTO-ENTMCNC: 33.1 % (ref 33–37)
MCV RBC AUTO: 94 FL (ref 80–100)
MONOCYTES ABSOLUTE: 0.7 K/UL (ref 0.2–0.8)
MONOCYTES RELATIVE PERCENT: 14.5 %
NEUTROPHILS ABSOLUTE: 2.1 K/UL (ref 1.4–6.5)
NEUTROPHILS RELATIVE PERCENT: 44.4 %
PDW BLD-RTO: 13.7 % (ref 11.5–14.5)
PLATELET # BLD: 351 K/UL (ref 130–400)
POTASSIUM SERPL-SCNC: 4.2 MEQ/L (ref 3.4–4.9)
RBC # BLD: 3.72 M/UL (ref 4.7–6.1)
SODIUM BLD-SCNC: 139 MEQ/L (ref 135–144)
WBC # BLD: 4.8 K/UL (ref 4.8–10.8)

## 2020-06-15 PROCEDURE — 6370000000 HC RX 637 (ALT 250 FOR IP): Performed by: INTERNAL MEDICINE

## 2020-06-15 PROCEDURE — 80048 BASIC METABOLIC PNL TOTAL CA: CPT

## 2020-06-15 PROCEDURE — 85025 COMPLETE CBC W/AUTO DIFF WBC: CPT

## 2020-06-15 PROCEDURE — 97530 THERAPEUTIC ACTIVITIES: CPT

## 2020-06-15 PROCEDURE — 6370000000 HC RX 637 (ALT 250 FOR IP): Performed by: ANESTHESIOLOGY

## 2020-06-15 PROCEDURE — 6360000002 HC RX W HCPCS: Performed by: INTERNAL MEDICINE

## 2020-06-15 PROCEDURE — 36592 COLLECT BLOOD FROM PICC: CPT

## 2020-06-15 PROCEDURE — 97110 THERAPEUTIC EXERCISES: CPT

## 2020-06-15 RX ORDER — FINASTERIDE 5 MG/1
5 TABLET, FILM COATED ORAL DAILY
Qty: 30 TABLET | Refills: 3 | Status: SHIPPED | OUTPATIENT
Start: 2020-06-16

## 2020-06-15 RX ADMIN — ACETAMINOPHEN 650 MG: 325 TABLET ORAL at 08:42

## 2020-06-15 RX ADMIN — GABAPENTIN 300 MG: 300 CAPSULE ORAL at 08:40

## 2020-06-15 RX ADMIN — FAMOTIDINE 20 MG: 20 TABLET ORAL at 08:40

## 2020-06-15 RX ADMIN — TRAMADOL HYDROCHLORIDE 50 MG: 50 TABLET, FILM COATED ORAL at 04:33

## 2020-06-15 RX ADMIN — MELOXICAM 7.5 MG: 7.5 TABLET ORAL at 08:40

## 2020-06-15 RX ADMIN — LEVOTHYROXINE SODIUM 75 MCG: 75 TABLET ORAL at 05:43

## 2020-06-15 RX ADMIN — TRAMADOL HYDROCHLORIDE 50 MG: 50 TABLET, FILM COATED ORAL at 10:34

## 2020-06-15 RX ADMIN — ENOXAPARIN SODIUM 40 MG: 40 INJECTION SUBCUTANEOUS at 08:39

## 2020-06-15 RX ADMIN — FINASTERIDE 5 MG: 5 TABLET, FILM COATED ORAL at 08:40

## 2020-06-15 ASSESSMENT — PAIN DESCRIPTION - ORIENTATION
ORIENTATION: LEFT
ORIENTATION: LEFT

## 2020-06-15 ASSESSMENT — PAIN DESCRIPTION - PAIN TYPE
TYPE: CHRONIC PAIN

## 2020-06-15 ASSESSMENT — PAIN SCALES - GENERAL
PAINLEVEL_OUTOF10: 6
PAINLEVEL_OUTOF10: 6
PAINLEVEL_OUTOF10: 7
PAINLEVEL_OUTOF10: 5
PAINLEVEL_OUTOF10: 6
PAINLEVEL_OUTOF10: 7
PAINLEVEL_OUTOF10: 6

## 2020-06-15 ASSESSMENT — PAIN DESCRIPTION - FREQUENCY: FREQUENCY: CONTINUOUS

## 2020-06-15 ASSESSMENT — PAIN DESCRIPTION - PROGRESSION
CLINICAL_PROGRESSION: GRADUALLY IMPROVING

## 2020-06-15 ASSESSMENT — PAIN DESCRIPTION - LOCATION: LOCATION: KNEE;THROAT

## 2020-06-15 ASSESSMENT — PAIN SCALES - WONG BAKER
WONGBAKER_NUMERICALRESPONSE: 0

## 2020-06-15 ASSESSMENT — PAIN DESCRIPTION - DESCRIPTORS: DESCRIPTORS: DISCOMFORT

## 2020-06-15 ASSESSMENT — PAIN DESCRIPTION - ONSET: ONSET: ON-GOING

## 2020-06-15 NOTE — DISCHARGE SUMMARY
Hospital Medicine Discharge Summary    Flaco Norton  :  1960  MRN:  [id number]    Admit date:  2020  Discharge date:  6/15/2020    Admitting Physician:  Shelton Ward MD  Primary Care Physician:  No primary care provider on file. Discharge Diagnoses:      History of laryngeal cancer status post tracheostomy  Left sternoclavicular joint abscess status post antibiotic treatment   Cellulitis  Debility  Deconditioning        Hospital Course: This 69-year-old  male with a past medical history of laryngeal cancer status post tracheostomy who was hospitalized initially at [facility] for cellulitis. He was treated for left sternoclavicular joint abscess and cellulitis. He had IV antibiotic infectious disease was consulted. He was moved to subacute rehab at [facility].  He finishes antibiotics. He had actually septic sternoclavicular joint and bacteremia. He was hemodynamically stable. Due to his complex medical issue and debility and after discussion with patient and family hospice was consulted. The patient went home with hospice. Exam on discharge:   /66   Pulse 66   Temp 98.4 °F (36.9 °C) (Oral)   Resp 18   Ht 5' 3\" (1.6 m)   Wt 134 lb 14.7 oz (61.2 kg)   SpO2 96%   BMI 23.90 kg/m²     Constitutional: Awake and alert in no acute distress.  Lying in bed comfortably  Head: Normocephalic, atraumatic  Eyes: EOMI, PERRLA  ENT: moist mucous membranes  Neck: neck supple, trachea midline, tracheostomy in place  Lungs: Good inspiratory effort, CTABL, no wheeze, no rhonchi, no rales  Heart: RRR, normal S1 and S2, no murmurs  GI: Soft, non-distended, non tender, no guarding, no rebound, +BS  MSK: no edema noted, SC joint with improving erythema  Skin: warm, dry  Psych: appropriate affect        Patient was seen by the following consultants while admitted to [facility]:   Consults:  Selena Francis Discharge Medications:       Linwood Lombard   Home Medication Instructions [id number]    Printed on:06/15/20 0179   Medication Information                      acetaminophen (TYLENOL) 325 MG tablet  Take 2 tablets by mouth every 6 hours as needed for Pain             finasteride (PROSCAR) 5 MG tablet  Take 1 tablet by mouth daily             gabapentin (NEURONTIN) 300 MG capsule  Take 1 capsule by mouth 3 times daily for 30 days. levothyroxine (SYNTHROID) 75 MCG tablet  Take 75 mcg by mouth Daily             meloxicam (MOBIC) 7.5 MG tablet  Take 2 tablets by mouth daily             senna-docusate (PERICOLACE) 8.6-50 MG per tablet  Take 1 tablet by mouth daily             traMADol (ULTRAM) 50 MG tablet  Take 1 tablet by mouth every 6 hours as needed for Pain for up to 7 days. Disposition:   Home with hospice  Condition at discharge: Fair    Activity: activity as tolerated    Total time taken for discharging this patient: 40 minutes. Greater than 70% of time was spent focused exclusively on this patient. Time was taken to review chart, discuss plans with consultants, reconciling medications, discussing plan answering questions with patient.      Rudy Garza  6/15/2020, 9:10 AM  ----------------------------------------------------------------------------------------------------------------------    En Justin

## 2020-06-15 NOTE — PROGRESS NOTES
Chart reviewed. Patient's care discussed in daily quality rounds. Plan is for patient to meet with [facility] on this date and then DC home. This  spoke with Lilly Elizabeth from [facility] whom reports that patient has signed onto services. Patient's son is reported to be transferring patient home from [facility] at 1pm on this date. This  met with patient whom greeted this  with a smile and mouthed \"thank you. \"  Patient identifies no further help at home needs as hospice will be following patient.

## 2020-06-15 NOTE — PROGRESS NOTES
Call light within reach, Left in chair     Therapy Time   Individual Concurrent Group Co-treatment   Time In  840         Time Out [address], [facility]  License and [address] Number: [id number]

## 2021-01-12 LAB — SARS-COV-2, NAAT: NOT DETECTED
